# Patient Record
Sex: FEMALE | Race: WHITE | NOT HISPANIC OR LATINO | Employment: OTHER | URBAN - METROPOLITAN AREA
[De-identification: names, ages, dates, MRNs, and addresses within clinical notes are randomized per-mention and may not be internally consistent; named-entity substitution may affect disease eponyms.]

---

## 2017-01-16 ENCOUNTER — TELEPHONE (OUTPATIENT)
Dept: OBSTETRICS AND GYNECOLOGY | Facility: CLINIC | Age: 82
End: 2017-01-16

## 2017-01-16 NOTE — TELEPHONE ENCOUNTER
----- Message from Bushra Torrez sent at 1/16/2017  2:05 PM CST -----  Contact: PAtient  Patient has an awful smell in her Vagina again. She would like for you to refill the prescription you gave her last time. Please send the prescription to [facility] on [address] and [facility]. Any questions call patient [phone].

## 2017-01-16 NOTE — TELEPHONE ENCOUNTER
Pt requesting flagyl to treat BV. She was positive for it in October and states the odor and discharge have returned.

## 2017-01-17 RX ORDER — METRONIDAZOLE 500 MG/1
500 TABLET ORAL EVERY 12 HOURS
Qty: 14 TABLET | Refills: 0 | Status: SHIPPED | OUTPATIENT
Start: 2017-01-17 | End: 2017-01-24

## 2017-03-20 ENCOUNTER — HOSPITAL ENCOUNTER (OUTPATIENT)
Dept: RADIOLOGY | Facility: CLINIC | Age: 82
Discharge: HOME | End: 2017-03-20
Attending: OBSTETRICS & GYNECOLOGY
Payer: MEDICARE

## 2017-03-20 ENCOUNTER — TELEPHONE (OUTPATIENT)
Dept: OBSTETRICS AND GYNECOLOGY | Facility: CLINIC | Age: 82
End: 2017-03-20

## 2017-03-20 ENCOUNTER — OFFICE VISIT (OUTPATIENT)
Dept: OBSTETRICS AND GYNECOLOGY | Facility: CLINIC | Age: 82
End: 2017-03-20
Payer: MEDICARE

## 2017-03-20 VITALS
DIASTOLIC BLOOD PRESSURE: 68 MMHG | SYSTOLIC BLOOD PRESSURE: 142 MMHG | BODY MASS INDEX: 26.94 KG/M2 | WEIGHT: 147.25 LBS

## 2017-03-20 DIAGNOSIS — Z12.31 SCREENING MAMMOGRAM, ENCOUNTER FOR: ICD-10-CM

## 2017-03-20 DIAGNOSIS — N89.8 VAGINAL ODOR: Primary | ICD-10-CM

## 2017-03-20 PROCEDURE — 99213 OFFICE O/P EST LOW 20 MIN: CPT | Mod: S$GLB,,, | Performed by: OBSTETRICS & GYNECOLOGY

## 2017-03-20 PROCEDURE — 1160F RVW MEDS BY RX/DR IN RCRD: CPT | Mod: S$GLB,,, | Performed by: OBSTETRICS & GYNECOLOGY

## 2017-03-20 PROCEDURE — 3077F SYST BP >= 140 MM HG: CPT | Mod: S$GLB,,, | Performed by: OBSTETRICS & GYNECOLOGY

## 2017-03-20 PROCEDURE — 1126F AMNT PAIN NOTED NONE PRSNT: CPT | Mod: S$GLB,,, | Performed by: OBSTETRICS & GYNECOLOGY

## 2017-03-20 PROCEDURE — 3078F DIAST BP <80 MM HG: CPT | Mod: S$GLB,,, | Performed by: OBSTETRICS & GYNECOLOGY

## 2017-03-20 PROCEDURE — 1157F ADVNC CARE PLAN IN RCRD: CPT | Mod: S$GLB,,, | Performed by: OBSTETRICS & GYNECOLOGY

## 2017-03-20 PROCEDURE — 77063 BREAST TOMOSYNTHESIS BI: CPT | Mod: 26,,, | Performed by: RADIOLOGY

## 2017-03-20 PROCEDURE — 1159F MED LIST DOCD IN RCRD: CPT | Mod: S$GLB,,, | Performed by: OBSTETRICS & GYNECOLOGY

## 2017-03-20 PROCEDURE — 77067 SCR MAMMO BI INCL CAD: CPT | Mod: TC

## 2017-03-20 PROCEDURE — 99999 PR PBB SHADOW E&M-EST. PATIENT-LVL III: CPT | Mod: PBBFAC,,, | Performed by: OBSTETRICS & GYNECOLOGY

## 2017-03-20 PROCEDURE — 77067 SCR MAMMO BI INCL CAD: CPT | Mod: 26,,, | Performed by: RADIOLOGY

## 2017-03-20 RX ORDER — MUPIROCIN 20 MG/G
OINTMENT TOPICAL
Refills: 0 | COMMUNITY
Start: 2017-01-20 | End: 2018-04-12

## 2017-03-20 NOTE — PROGRESS NOTES
Chief Complaint   Patient presents with    Vaginal Odor     Hx of BV    Vaginal Discharge       History of Present Illness: Bhakti Phipps is a 82 y.o. female that presents today 3/20/2017 for   Chief Complaint   Patient presents with    Vaginal Odor     Hx of BV    Vaginal Discharge   she reports odor bothers her but light discharge.       Past Medical History:   Diagnosis Date    Anxiety     Arthritis     History of melanoma     at age of 26    HTN (hypertension) 5/21/2012    Hypertension     Hypothyroid 5/21/2012    Hypothyroidism     Insomnia 2/25/2014    Melanoma     age 26    Osteoarthritis 5/21/2012    Tremor        Past Surgical History:   Procedure Laterality Date    CHOLECYSTECTOMY  10-11    lap ayse    HYSTERECTOMY      with oopherectomy    JOINT REPLACEMENT      L knee    RADICAL NECK DISSECTION      for melanoma       Current Outpatient Prescriptions   Medication Sig Dispense Refill    ascorbic acid (VITAMIN C) 1000 MG tablet Take 1,000 mg by mouth once daily.      aspirin (ECOTRIN) 81 MG EC tablet Take 81 mg by mouth once daily.      CALCIUM CARBONATE (CALCIUM 500 ORAL) Take by mouth once daily.      cholecalciferol, vitamin D3, (VITAMIN D3) 1,000 unit capsule Take 1,000 Units by mouth once daily.      cyanocobalamin, vitamin B-12, 3,000 mcg Subl Place under the tongue once daily.      hydrochlorothiazide (HYDRODIURIL) 12.5 MG Tab TAKE 1 TABLET ONE TIME DAILY 90 tablet 3    hydrocodone-acetaminophen 5-325mg (NORCO) 5-325 mg per tablet Take 0.5 tablets by mouth 2 (two) times daily as needed for Pain. 90 tablet 0    LACTOBAC CMB #3/FOS/PANTETHINE (PROBIOTIC & ACIDOPHILUS ORAL) Take 1 capsule by mouth once daily.       levothyroxine (SYNTHROID) 125 MCG tablet TAKE 1 TABLET ONE TIME DAILY 90 tablet 1    lidocaine (LIDODERM) 5 % Place 1 patch onto the skin once daily. Remove & Discard patch within 12 hours or as directed by MD 30 patch 2    lorazepam (ATIVAN) 1 MG tablet Take  1 tablet (1 mg total) by mouth every evening. 90 tablet 1    magnesium oxide 500 mg Tab Take 1 tablet by mouth once daily.      meloxicam (MOBIC) 15 MG tablet Take 15 mg by mouth once daily.      mupirocin (BACTROBAN) 2 % ointment APPLY TO THE AFFECTED AREA TID PRN  0    oxybutynin (DITROPAN) 5 MG Tab Take 1 tablet (5 mg total) by mouth 3 (three) times daily. 180 tablet 3    propranolol (INDERAL) 10 MG tablet Take 1 tablet (10 mg total) by mouth 2 (two) times daily. 180 tablet 3    temazepam (RESTORIL) 30 mg capsule TAKE 1 CAPSULE EVERY NIGHT AS NEEDED  FOR  INSOMNIA 90 capsule 1    venlafaxine (EFFEXOR-XR) 37.5 MG 24 hr capsule Take 1 capsule (37.5 mg total) by mouth once daily. 90 capsule 3     No current facility-administered medications for this visit.        Review of patient's allergies indicates:   Allergen Reactions    Sulfa (sulfonamide antibiotics) Swelling       Family History   Problem Relation Age of Onset    Cancer Mother      pancreatic    Cancer Father      leukemia    Cancer Brother      leukemia    Cancer Sister      liver cancer       Social History   Substance Use Topics    Smoking status: Former Smoker     Quit date: 5/21/1975    Smokeless tobacco: Never Used    Alcohol use No       OB History   No data available       Review of Symptoms:  GENERAL: fatigue  Taking effexor with Dr. rodriguez for depression  SKIN: Denies rash or lesions.   HEAD: Denies head injury or headache.   NODES: Denies enlarged lymph nodes.   CHEST: Denies chest pain or shortness of breath.   CARDIOVASCULAR: Denies palpitations or left sided chest pain.   ABDOMEN: No abdominal pain, constipation, diarrhea, nausea, vomiting or rectal bleeding.   URINARY: No frequency, dysuria, hematuria, or burning on urination.  HEMATOLOGIC: No easy bruisability or excessive bleeding.   MUSCULOSKELETAL: Denies joint pain or swelling.     BP (!) 142/68  Wt 66.8 kg (147 lb 4.3 oz)  BMI 26.94 kg/m2  Physical Exam:  APPEARANCE: Well  nourished, well developed, in no acute distress.  SKIN: Normal skin turgor, no lesions.  NECK: Neck symmetric without masses   RESPIRATORY: Normal respiratory effort with no retractions or use of accessory muscles  CARDIOVASCULAR: Peripheral vascular system with no swelling no varicosities and palpation of pulses normal  LYMPHATIC: No enlargements of the lymph nodes noted in the neck, axillae, or groin  ABDOMEN: Soft. No tenderness or masses. No hepatosplenomegaly. No hernias.PELVIC: Normal external female genitalia without lesions. Normal hair distribution. Adequate perineal body, normal urethral meatus. Urethra with no masses.  Bladder nontender. Vagina moist and well rugated without lesions or discharge.  Urethra and bladder normal.  EXTREMITIES: No clubbing cyanosis or edema.    ASSESSMENT/PLAN:  Vaginal odor  -     Vaginosis Screen by DNA Probe    Screening mammogram, encounter for  -     Mammo Digital Screening Bilat with CAD; Future; Expected date: 3/20/17        15 minutes spent today with this patient. Greater than half spent in counseling today.

## 2017-03-20 NOTE — TELEPHONE ENCOUNTER
----- Message from Katrin Unger sent at 3/20/2017  7:33 AM CDT -----  Patient is requesting a call back states she has a funny smell/bacterial infection, she is unable to get an appointment until 04/04/17, contact patient at [phone] to advise.     Thank you

## 2017-03-20 NOTE — MR AVS SNAPSHOT
[facility]  [address] [place] [address]  Phone: [phone]                  Bhakti Phipps   3/20/2017 11:20 AM   Office Visit    Description:  Female : 1934   Provider:  Laly Cross MD   Department:  [facility]           Reason for Visit     Vaginal Odor     Vaginal Discharge                To Do List           Future Appointments        Provider Department Dept Phone    3/20/2017 11:20 AM Laly Cross MD [facility] [phone]      Goals (5 Years of Data)     None      [facility]     [facility] -  Assistance  Registered nurses in the [facility] provide clinical advisement, health education, appointment booking, and other advisory services.  Call for this free service at [phone].             Medications           Message regarding Medications     Verify the changes and/or additions to your medication regime listed below are the same as discussed with your clinician today.  If any of these changes or additions are incorrect, please notify your healthcare provider.             Verify that the below list of medications is an accurate representation of the medications you are currently taking.  If none reported, the list may be blank. If incorrect, please contact your healthcare provider. Carry this list with you in case of emergency.           Current Medications     ascorbic acid (VITAMIN C) 1000 MG tablet Take 1,000 mg by mouth once daily.    aspirin (ECOTRIN) 81 MG EC tablet Take 81 mg by mouth once daily.    CALCIUM CARBONATE (CALCIUM 500 ORAL) Take by mouth once daily.    cholecalciferol, vitamin D3, (VITAMIN D3) 1,000 unit capsule Take 1,000 Units by mouth once daily.    cyanocobalamin, vitamin B-12, 3,000 mcg Subl Place under the tongue once daily.    hydrochlorothiazide (HYDRODIURIL) 12.5 MG Tab TAKE 1 TABLET ONE TIME DAILY    hydrocodone-acetaminophen 5-325mg (NORCO) 5-325 mg  per tablet Take 0.5 tablets by mouth 2 (two) times daily as needed for Pain.    LACTOBAC CMB #3/FOS/PANTETHINE (PROBIOTIC & ACIDOPHILUS ORAL) Take 1 capsule by mouth once daily.     levothyroxine (SYNTHROID) 125 MCG tablet TAKE 1 TABLET ONE TIME DAILY    lidocaine (LIDODERM) 5 % Place 1 patch onto the skin once daily. Remove & Discard patch within 12 hours or as directed by MD    lorazepam (ATIVAN) 1 MG tablet Take 1 tablet (1 mg total) by mouth every evening.    magnesium oxide 500 mg Tab Take 1 tablet by mouth once daily.    meloxicam (MOBIC) 15 MG tablet Take 15 mg by mouth once daily.    mupirocin (BACTROBAN) 2 % ointment APPLY TO THE AFFECTED AREA TID PRN    oxybutynin (DITROPAN) 5 MG Tab Take 1 tablet (5 mg total) by mouth 3 (three) times daily.    propranolol (INDERAL) 10 MG tablet Take 1 tablet (10 mg total) by mouth 2 (two) times daily.    temazepam (RESTORIL) 30 mg capsule TAKE 1 CAPSULE EVERY NIGHT AS NEEDED  FOR  INSOMNIA    venlafaxine (EFFEXOR-XR) 37.5 MG 24 hr capsule Take 1 capsule (37.5 mg total) by mouth once daily.           Clinical Reference Information           Your Vitals Were     BP Weight BMI          142/68 66.8 kg (147 lb 4.3 oz) 26.94 kg/m2        Blood Pressure          Most Recent Value    BP  (!)  142/68      Allergies as of 3/20/2017     Sulfa (Sulfonamide Antibiotics)      Immunizations Administered on Date of Encounter - 3/20/2017     None      Language Assistance Services     ATTENTION: Language assistance services are available, free of charge. Please call [phone].      ATENCIÓN: Si prashanth davalos, tiene a desir disposición servicios gratuitos de asistencia lingüística. Llame al [phone].     EARNEST Ý: N?u b?n nói Ti?ng Vi?t, có các d?ch v? h? tr? ngôn ng? mi?n phí dành cho b?n. G?i s? [phone].         [facility] complies with applicable Federal civil rights laws and does not discriminate on the basis of race, color, national origin, age, disability, or  sex.

## 2017-03-21 ENCOUNTER — TELEPHONE (OUTPATIENT)
Dept: OBSTETRICS AND GYNECOLOGY | Facility: CLINIC | Age: 82
End: 2017-03-21

## 2017-03-21 LAB
CANDIDA RRNA VAG QL PROBE: NEGATIVE
G VAGINALIS RRNA GENITAL QL PROBE: POSITIVE
T VAGINALIS RRNA GENITAL QL PROBE: NEGATIVE

## 2017-03-21 RX ORDER — METRONIDAZOLE 500 MG/1
500 TABLET ORAL EVERY 12 HOURS
Qty: 20 TABLET | Refills: 0 | Status: SHIPPED | OUTPATIENT
Start: 2017-03-21 | End: 2017-03-28

## 2017-03-27 ENCOUNTER — TELEPHONE (OUTPATIENT)
Dept: OBSTETRICS AND GYNECOLOGY | Facility: CLINIC | Age: 82
End: 2017-03-27

## 2017-03-27 NOTE — TELEPHONE ENCOUNTER
----- Message from Crystal Myers sent at 3/27/2017  9:21 AM CDT -----  Contact: etelvina   Calling, appt cancelled   Wants to know if follow up appt needs to be scheduled  Call back

## 2017-04-19 RX ORDER — HYDROCODONE BITARTRATE AND ACETAMINOPHEN 5; 325 MG/1; MG/1
0.5 TABLET ORAL 2 TIMES DAILY PRN
Qty: 90 TABLET | Refills: 0 | Status: SHIPPED | OUTPATIENT
Start: 2017-04-19 | End: 2017-10-03 | Stop reason: SDUPTHER

## 2017-04-19 NOTE — TELEPHONE ENCOUNTER
----- Message from Alexandra Robb sent at 4/19/2017 11:11 AM CDT -----  Contact: Patient  Bhakti, patient [phone], Calling for refill Rx Hydrocodone APAP 5/325 mg one pill per day,  for 90 days.    [facility] - [place], [place] - [address]  [address] [address]  Phone: [phone] Fax: [phone]    Please advise. Thanks.

## 2017-05-24 RX ORDER — OXYBUTYNIN CHLORIDE 5 MG/1
TABLET ORAL
Qty: 270 TABLET | Refills: 3 | Status: SHIPPED | OUTPATIENT
Start: 2017-05-24 | End: 2017-10-03 | Stop reason: SDUPTHER

## 2017-06-16 RX ORDER — TEMAZEPAM 30 MG/1
CAPSULE ORAL
Qty: 90 CAPSULE | Refills: 0 | Status: SHIPPED | OUTPATIENT
Start: 2017-06-16 | End: 2017-09-26 | Stop reason: SDUPTHER

## 2017-07-11 RX ORDER — HYDROCHLOROTHIAZIDE 12.5 MG/1
TABLET ORAL
Qty: 90 TABLET | Refills: 3 | Status: SHIPPED | OUTPATIENT
Start: 2017-07-11 | End: 2017-10-03 | Stop reason: SDUPTHER

## 2017-07-31 ENCOUNTER — OFFICE VISIT (OUTPATIENT)
Dept: FAMILY MEDICINE | Facility: CLINIC | Age: 82
End: 2017-07-31
Payer: MEDICARE

## 2017-07-31 VITALS
TEMPERATURE: 98 F | DIASTOLIC BLOOD PRESSURE: 72 MMHG | SYSTOLIC BLOOD PRESSURE: 132 MMHG | WEIGHT: 151.69 LBS | HEART RATE: 82 BPM | BODY MASS INDEX: 27.92 KG/M2 | HEIGHT: 62 IN

## 2017-07-31 DIAGNOSIS — N39.0 URINARY TRACT INFECTION WITHOUT HEMATURIA, SITE UNSPECIFIED: Primary | ICD-10-CM

## 2017-07-31 LAB
BILIRUB SERPL-MCNC: NEGATIVE MG/DL
BLOOD URINE, POC: 250
COLOR, POC UA: NORMAL
GLUCOSE UR QL STRIP: NORMAL
KETONES UR QL STRIP: NEGATIVE
LEUKOCYTE ESTERASE URINE, POC: NORMAL
NITRITE, POC UA: POSITIVE
PH, POC UA: 6
PROTEIN, POC: 30
SPECIFIC GRAVITY, POC UA: 1.01
UROBILINOGEN, POC UA: NORMAL

## 2017-07-31 PROCEDURE — 81001 URINALYSIS AUTO W/SCOPE: CPT | Mod: S$GLB,,, | Performed by: PHYSICIAN ASSISTANT

## 2017-07-31 PROCEDURE — 99999 PR PBB SHADOW E&M-EST. PATIENT-LVL V: CPT | Mod: PBBFAC,,, | Performed by: PHYSICIAN ASSISTANT

## 2017-07-31 PROCEDURE — 99213 OFFICE O/P EST LOW 20 MIN: CPT | Mod: 25,S$GLB,, | Performed by: PHYSICIAN ASSISTANT

## 2017-07-31 PROCEDURE — 87147 CULTURE TYPE IMMUNOLOGIC: CPT

## 2017-07-31 PROCEDURE — 87086 URINE CULTURE/COLONY COUNT: CPT

## 2017-07-31 PROCEDURE — 87088 URINE BACTERIA CULTURE: CPT

## 2017-07-31 PROCEDURE — 1126F AMNT PAIN NOTED NONE PRSNT: CPT | Mod: S$GLB,,, | Performed by: PHYSICIAN ASSISTANT

## 2017-07-31 PROCEDURE — 1159F MED LIST DOCD IN RCRD: CPT | Mod: S$GLB,,, | Performed by: PHYSICIAN ASSISTANT

## 2017-07-31 RX ORDER — CIPROFLOXACIN 250 MG/1
250 TABLET, FILM COATED ORAL 2 TIMES DAILY
Qty: 20 TABLET | Refills: 0 | Status: SHIPPED | OUTPATIENT
Start: 2017-07-31 | End: 2017-08-10

## 2017-07-31 NOTE — PROGRESS NOTES
Subjective:       Patient ID: Bhakti Phipps is a 83 y.o. female.    Chief Complaint: possible UTI (foul smell urine, burning)    HPI   Mild dysuria  Review of Systems   Constitutional: Negative.  Negative for activity change, appetite change, chills, diaphoresis, fatigue, fever and unexpected weight change.   HENT: Negative.    Eyes: Negative.    Cardiovascular: Negative.    Gastrointestinal: Negative.    Endocrine: Negative.    Genitourinary: Positive for dysuria, frequency and urgency. Negative for flank pain and hematuria.   Musculoskeletal: Negative.    Skin: Negative.  Negative for rash.       Objective:      Physical Exam   Constitutional: She appears well-developed and well-nourished. No distress.   HENT:   Head: Normocephalic and atraumatic.   Eyes: Conjunctivae are normal. No scleral icterus.   Neck: Normal range of motion. Neck supple. No tracheal deviation present. No thyromegaly present.   Abdominal: Soft. Bowel sounds are normal. She exhibits no distension and no mass. There is no tenderness. There is no rebound and no guarding. No hernia.   No CVA tenderness   Musculoskeletal: She exhibits no edema.   Lymphadenopathy:     She has no cervical adenopathy.   Skin: Skin is warm and dry. No rash noted.   Vitals reviewed.      Assessment:       1. Urinary tract infection without hematuria, site unspecified        Plan:   Bhakti was seen today for possible uti.    Diagnoses and all orders for this visit:    Urinary tract infection without hematuria, site unspecified  -     POCT urinalysis, dipstick or tablet reag  -     Urine culture  -     ciprofloxacin HCl (CIPRO) 250 MG tablet; Take 1 tablet (250 mg total) by mouth 2 (two) times daily.    hydrate well

## 2017-08-02 LAB — BACTERIA UR CULT: NORMAL

## 2017-08-11 ENCOUNTER — TELEPHONE (OUTPATIENT)
Dept: FAMILY MEDICINE | Facility: CLINIC | Age: 82
End: 2017-08-11

## 2017-08-11 DIAGNOSIS — N39.0 URINARY TRACT INFECTION WITHOUT HEMATURIA, SITE UNSPECIFIED: Primary | ICD-10-CM

## 2017-08-11 NOTE — TELEPHONE ENCOUNTER
Kishore,  Pt took cipro x 10 days. Still has a slight odor to urine. She wanted to know what culture grew out and if she needs more abx.

## 2017-08-11 NOTE — TELEPHONE ENCOUNTER
----- Message from Carol Jacobo sent at 8/11/2017 10:40 AM CDT -----  Contact: [phone]    Calling  About  meds  For a  uti // please call for  All details//

## 2017-08-16 RX ORDER — LEVOTHYROXINE SODIUM 125 UG/1
TABLET ORAL
Qty: 90 TABLET | Refills: 1 | Status: SHIPPED | OUTPATIENT
Start: 2017-08-16 | End: 2017-10-03 | Stop reason: SDUPTHER

## 2017-08-18 ENCOUNTER — LAB VISIT (OUTPATIENT)
Dept: LAB | Facility: HOSPITAL | Age: 82
End: 2017-08-18
Attending: FAMILY MEDICINE
Payer: MEDICARE

## 2017-08-18 DIAGNOSIS — N39.0 URINARY TRACT INFECTION WITHOUT HEMATURIA, SITE UNSPECIFIED: ICD-10-CM

## 2017-08-18 LAB
BACTERIA #/AREA URNS HPF: NORMAL /HPF
BILIRUB UR QL STRIP: NEGATIVE
CLARITY UR: CLEAR
COLOR UR: YELLOW
GLUCOSE UR QL STRIP: NEGATIVE
HGB UR QL STRIP: ABNORMAL
KETONES UR QL STRIP: NEGATIVE
LEUKOCYTE ESTERASE UR QL STRIP: NEGATIVE
MICROSCOPIC COMMENT: NORMAL
NITRITE UR QL STRIP: NEGATIVE
PH UR STRIP: 6 [PH] (ref 5–8)
PROT UR QL STRIP: NEGATIVE
RBC #/AREA URNS HPF: 3 /HPF (ref 0–4)
SP GR UR STRIP: 1.02 (ref 1–1.03)
URN SPEC COLLECT METH UR: ABNORMAL

## 2017-08-18 PROCEDURE — 81000 URINALYSIS NONAUTO W/SCOPE: CPT | Mod: PO

## 2017-08-18 PROCEDURE — 87086 URINE CULTURE/COLONY COUNT: CPT

## 2017-08-20 LAB — BACTERIA UR CULT: NO GROWTH

## 2017-08-21 ENCOUNTER — TELEPHONE (OUTPATIENT)
Dept: FAMILY MEDICINE | Facility: CLINIC | Age: 82
End: 2017-08-21

## 2017-09-27 RX ORDER — TEMAZEPAM 30 MG/1
CAPSULE ORAL
Qty: 30 CAPSULE | Refills: 0 | Status: SHIPPED | OUTPATIENT
Start: 2017-09-27 | End: 2017-09-27 | Stop reason: SDUPTHER

## 2017-09-27 RX ORDER — TEMAZEPAM 30 MG/1
CAPSULE ORAL
Qty: 30 CAPSULE | Refills: 0 | Status: SHIPPED | OUTPATIENT
Start: 2017-09-27 | End: 2017-10-03 | Stop reason: SDUPTHER

## 2017-09-27 NOTE — TELEPHONE ENCOUNTER
Notify patient that temazepam 30 mg 1 by mouth daily at bedtime when necessary sleep was sent in with 0 refills; this should hold her to Dr. Jauregui returns

## 2017-09-28 ENCOUNTER — TELEPHONE (OUTPATIENT)
Dept: FAMILY MEDICINE | Facility: CLINIC | Age: 82
End: 2017-09-28

## 2017-09-28 NOTE — TELEPHONE ENCOUNTER
Spoke w/ pt. Informed pt about refill approval. Pt verbalized understanding. Pt will pick it up on 10/3/2017.

## 2017-10-03 ENCOUNTER — OFFICE VISIT (OUTPATIENT)
Dept: FAMILY MEDICINE | Facility: CLINIC | Age: 82
End: 2017-10-03
Payer: MEDICARE

## 2017-10-03 VITALS
HEART RATE: 70 BPM | SYSTOLIC BLOOD PRESSURE: 132 MMHG | BODY MASS INDEX: 27.97 KG/M2 | TEMPERATURE: 98 F | HEIGHT: 62 IN | DIASTOLIC BLOOD PRESSURE: 68 MMHG | WEIGHT: 152 LBS

## 2017-10-03 DIAGNOSIS — Z00.00 ROUTINE ADULT HEALTH MAINTENANCE: ICD-10-CM

## 2017-10-03 DIAGNOSIS — R53.83 FATIGUE, UNSPECIFIED TYPE: ICD-10-CM

## 2017-10-03 DIAGNOSIS — F41.9 ANXIETY: ICD-10-CM

## 2017-10-03 DIAGNOSIS — N39.45 CONTINUOUS LEAKAGE OF URINE: ICD-10-CM

## 2017-10-03 DIAGNOSIS — M47.15 OSTEOARTHRITIS OF SPINE WITH MYELOPATHY, THORACOLUMBAR REGION: ICD-10-CM

## 2017-10-03 DIAGNOSIS — I10 ESSENTIAL HYPERTENSION: Primary | ICD-10-CM

## 2017-10-03 DIAGNOSIS — E03.4 HYPOTHYROIDISM DUE TO ACQUIRED ATROPHY OF THYROID: ICD-10-CM

## 2017-10-03 DIAGNOSIS — F32.A DEPRESSION, UNSPECIFIED DEPRESSION TYPE: ICD-10-CM

## 2017-10-03 DIAGNOSIS — R25.1 TREMOR: ICD-10-CM

## 2017-10-03 PROCEDURE — 90662 IIV NO PRSV INCREASED AG IM: CPT | Mod: S$GLB,,, | Performed by: FAMILY MEDICINE

## 2017-10-03 PROCEDURE — G0008 ADMIN INFLUENZA VIRUS VAC: HCPCS | Mod: S$GLB,,, | Performed by: FAMILY MEDICINE

## 2017-10-03 PROCEDURE — 99214 OFFICE O/P EST MOD 30 MIN: CPT | Mod: 25,S$GLB,, | Performed by: FAMILY MEDICINE

## 2017-10-03 PROCEDURE — 99999 PR PBB SHADOW E&M-EST. PATIENT-LVL III: CPT | Mod: PBBFAC,,, | Performed by: FAMILY MEDICINE

## 2017-10-03 PROCEDURE — 99499 UNLISTED E&M SERVICE: CPT | Mod: S$GLB,,, | Performed by: FAMILY MEDICINE

## 2017-10-03 RX ORDER — OXYBUTYNIN CHLORIDE 5 MG/1
5 TABLET ORAL 3 TIMES DAILY
Qty: 270 TABLET | Refills: 3 | Status: SHIPPED | OUTPATIENT
Start: 2017-10-03 | End: 2018-04-12

## 2017-10-03 RX ORDER — HYDROCODONE BITARTRATE AND ACETAMINOPHEN 5; 325 MG/1; MG/1
0.5 TABLET ORAL 2 TIMES DAILY PRN
Qty: 90 TABLET | Refills: 0 | Status: SHIPPED | OUTPATIENT
Start: 2017-10-03 | End: 2018-04-12 | Stop reason: SDUPTHER

## 2017-10-03 RX ORDER — TEMAZEPAM 30 MG/1
CAPSULE ORAL
Qty: 90 CAPSULE | Refills: 1 | Status: SHIPPED | OUTPATIENT
Start: 2017-10-03 | End: 2018-04-12 | Stop reason: SDUPTHER

## 2017-10-03 RX ORDER — HYDROCHLOROTHIAZIDE 12.5 MG/1
TABLET ORAL
Qty: 90 TABLET | Refills: 3 | Status: SHIPPED | OUTPATIENT
Start: 2017-10-03 | End: 2018-04-12 | Stop reason: SDUPTHER

## 2017-10-03 RX ORDER — PROPRANOLOL HYDROCHLORIDE 10 MG/1
10 TABLET ORAL 2 TIMES DAILY
Qty: 180 TABLET | Refills: 3 | Status: SHIPPED | OUTPATIENT
Start: 2017-10-03 | End: 2018-08-23 | Stop reason: SINTOL

## 2017-10-03 RX ORDER — MELOXICAM 15 MG/1
15 TABLET ORAL DAILY
Qty: 30 TABLET | Refills: 2 | Status: SHIPPED | OUTPATIENT
Start: 2017-10-03 | End: 2017-12-06 | Stop reason: SDUPTHER

## 2017-10-03 RX ORDER — LORAZEPAM 1 MG/1
1 TABLET ORAL NIGHTLY
Qty: 90 TABLET | Refills: 1 | Status: SHIPPED | OUTPATIENT
Start: 2017-10-03 | End: 2018-04-12 | Stop reason: SDUPTHER

## 2017-10-03 RX ORDER — LEVOTHYROXINE SODIUM 125 UG/1
125 TABLET ORAL DAILY
Qty: 90 TABLET | Refills: 4 | Status: SHIPPED | OUTPATIENT
Start: 2017-10-03 | End: 2018-10-08 | Stop reason: SDUPTHER

## 2017-10-03 RX ORDER — TEMAZEPAM 30 MG/1
CAPSULE ORAL
Qty: 30 CAPSULE | Refills: 0 | Status: SHIPPED | OUTPATIENT
Start: 2017-10-03 | End: 2017-10-03 | Stop reason: SDUPTHER

## 2017-10-03 NOTE — PROGRESS NOTES
Subjective:       Patient ID: Bhakti Phipps is a 83 y.o. female.    Chief Complaint: Annual Exam (Annual check up. Poss due for lab. Needs flu shot. Poss ortho referral for L knee pain, PMHx L knee repleacement)    Went to the dentist to replace her dentures, was told the bones in her face were shifting.    L knee replaced 12 years ago, started causing pain 2-3 weeks ago after it twisted a bit as she stood up. No pain at rest, highest in mornings when she wakes up, better as she moves around.    Took ambien after 1 glass of wine recently, caused her dizziness for 2 days and had to stay in bed for 2 days. Was worked up by cardiology-no problems with carotids, arrhythmias.    Occasionally chest tightness after eating. Rare, after solid foods. Has trouble chewing with current dentures.    Fatigue, increased after  passed away. Has lost interest in going out with friends. Has lots of family members checking in with her frequently. Has iron deficiency anemia family Hx. No weight loss, no fever, no sweating.    Lumbar and cervical pain, relieved with ice packs. Manageable.      Review of Systems   Constitutional: Negative for activity change, appetite change, diaphoresis, fatigue, fever and unexpected weight change.   HENT: Negative.    Eyes: Negative.    Respiratory: Positive for chest tightness. Negative for cough, choking, shortness of breath and wheezing.    Cardiovascular: Negative for chest pain, palpitations and leg swelling.   Gastrointestinal: Positive for constipation. Negative for abdominal pain, blood in stool, diarrhea, nausea and vomiting.   Genitourinary: Negative for hematuria and urgency.        Incontinence     Musculoskeletal: Positive for arthralgias, back pain and neck pain. Negative for gait problem, joint swelling and myalgias.   Skin: Negative.    Neurological: Negative for syncope, light-headedness and headaches.   Hematological: Negative.    Psychiatric/Behavioral: Positive for  dysphoric mood.       Objective:      Physical Exam   Constitutional: She appears well-developed and well-nourished.   HENT:   Head: Normocephalic and atraumatic.   Fine resting tremor of lower lip   Eyes: Conjunctivae are normal.   Neck: Normal range of motion. Neck supple.   Cardiovascular: Normal rate, regular rhythm, normal heart sounds and intact distal pulses.    Pulmonary/Chest: Effort normal and breath sounds normal.   Abdominal: Soft. Bowel sounds are normal.   Musculoskeletal: Normal range of motion.        Left knee: She exhibits normal range of motion, no swelling, no effusion, no deformity and normal alignment. Tenderness found. Medial joint line tenderness noted.   Neurological: She is alert.   Skin: Skin is warm.   Psychiatric: She has a normal mood and affect. Her behavior is normal. Judgment and thought content normal.       Assessment:       1. Essential hypertension    2. Tremor    3. Hypothyroidism due to acquired atrophy of thyroid    4. Osteoarthritis of spine with myelopathy, thoracolumbar region    5. Anxiety    6. Depression, unspecified depression type    7. Routine adult health maintenance    8. Continuous leakage of urine    9. Fatigue, unspecified type        Plan:       1. Fatigue  -CBC, CMP, TSH, UA    2. Anxiety  -Lorazepam taken as needed rarely  -Trial stopping venlafaxine (to help with fatigue and she doesn't feel need for daily anxiety control). Warned about ache and figity feeling during withdrawal and to represent if increase in anxiety or depression Sx.    3. Health maintenance  -Flu vax

## 2017-10-04 ENCOUNTER — LAB VISIT (OUTPATIENT)
Dept: LAB | Facility: HOSPITAL | Age: 82
End: 2017-10-04
Attending: FAMILY MEDICINE
Payer: MEDICARE

## 2017-10-04 DIAGNOSIS — R53.83 FATIGUE, UNSPECIFIED TYPE: ICD-10-CM

## 2017-10-04 DIAGNOSIS — I10 ESSENTIAL HYPERTENSION: ICD-10-CM

## 2017-10-04 DIAGNOSIS — E03.4 HYPOTHYROIDISM DUE TO ACQUIRED ATROPHY OF THYROID: ICD-10-CM

## 2017-10-04 LAB
ALBUMIN SERPL BCP-MCNC: 3.8 G/DL
ALP SERPL-CCNC: 89 U/L
ALT SERPL W/O P-5'-P-CCNC: 15 U/L
ANION GAP SERPL CALC-SCNC: 9 MMOL/L
AST SERPL-CCNC: 14 U/L
BASOPHILS # BLD AUTO: 0.05 K/UL
BASOPHILS NFR BLD: 0.6 %
BILIRUB SERPL-MCNC: 0.6 MG/DL
BUN SERPL-MCNC: 20 MG/DL
CALCIUM SERPL-MCNC: 9.8 MG/DL
CHLORIDE SERPL-SCNC: 102 MMOL/L
CHOLEST SERPL-MCNC: 187 MG/DL
CHOLEST/HDLC SERPL: 4.6 {RATIO}
CO2 SERPL-SCNC: 29 MMOL/L
CREAT SERPL-MCNC: 0.8 MG/DL
DIFFERENTIAL METHOD: ABNORMAL
EOSINOPHIL # BLD AUTO: 0.2 K/UL
EOSINOPHIL NFR BLD: 2.1 %
ERYTHROCYTE [DISTWIDTH] IN BLOOD BY AUTOMATED COUNT: 13.9 %
EST. GFR  (AFRICAN AMERICAN): >60 ML/MIN/1.73 M^2
EST. GFR  (NON AFRICAN AMERICAN): >60 ML/MIN/1.73 M^2
GLUCOSE SERPL-MCNC: 98 MG/DL
HCT VFR BLD AUTO: 36.1 %
HDLC SERPL-MCNC: 41 MG/DL
HDLC SERPL: 21.9 %
HGB BLD-MCNC: 11.8 G/DL
LDLC SERPL CALC-MCNC: 115 MG/DL
LYMPHOCYTES # BLD AUTO: 1.5 K/UL
LYMPHOCYTES NFR BLD: 16.8 %
MCH RBC QN AUTO: 29.5 PG
MCHC RBC AUTO-ENTMCNC: 32.7 G/DL
MCV RBC AUTO: 90 FL
MONOCYTES # BLD AUTO: 0.8 K/UL
MONOCYTES NFR BLD: 9.2 %
NEUTROPHILS # BLD AUTO: 6.3 K/UL
NEUTROPHILS NFR BLD: 71.1 %
NONHDLC SERPL-MCNC: 146 MG/DL
PLATELET # BLD AUTO: 317 K/UL
PMV BLD AUTO: 10.8 FL
POTASSIUM SERPL-SCNC: 4.5 MMOL/L
PROT SERPL-MCNC: 7.3 G/DL
RBC # BLD AUTO: 4 M/UL
SODIUM SERPL-SCNC: 140 MMOL/L
TRIGL SERPL-MCNC: 155 MG/DL
TSH SERPL DL<=0.005 MIU/L-ACNC: 2.43 UIU/ML
WBC # BLD AUTO: 8.88 K/UL

## 2017-10-04 PROCEDURE — 80053 COMPREHEN METABOLIC PANEL: CPT

## 2017-10-04 PROCEDURE — 36415 COLL VENOUS BLD VENIPUNCTURE: CPT | Mod: PO

## 2017-10-04 PROCEDURE — 80061 LIPID PANEL: CPT

## 2017-10-04 PROCEDURE — 85025 COMPLETE CBC W/AUTO DIFF WBC: CPT

## 2017-10-04 PROCEDURE — 84443 ASSAY THYROID STIM HORMONE: CPT

## 2017-10-12 ENCOUNTER — TELEPHONE (OUTPATIENT)
Dept: FAMILY MEDICINE | Facility: CLINIC | Age: 82
End: 2017-10-12

## 2017-10-12 RX ORDER — CODEINE PHOSPHATE AND GUAIFENESIN 10; 100 MG/5ML; MG/5ML
5 SOLUTION ORAL EVERY 8 HOURS PRN
Qty: 180 ML | Refills: 1 | Status: SHIPPED | OUTPATIENT
Start: 2017-10-12 | End: 2017-10-22

## 2017-10-12 NOTE — TELEPHONE ENCOUNTER
----- Message from Val Lopez sent at 10/12/2017  7:45 AM CDT -----  Contact: patient  Patient calling in regards to requesting something be called in for a bad sore throat and cough. Please advise.  Call back   Thanks!    [facility] [id number] [place] [address] [address] [place] [address] AT [facility]  [address] [facility] [address]  Phone: [phone] Fax: [phone]

## 2017-10-12 NOTE — TELEPHONE ENCOUNTER
Spoke with pt, she states she has had cough x5 days. She is hoarse and has sore throat. Denies fever. Requesting Rx for cough.

## 2017-10-20 RX ORDER — VENLAFAXINE HYDROCHLORIDE 37.5 MG/1
CAPSULE, EXTENDED RELEASE ORAL
Qty: 90 CAPSULE | Refills: 0 | Status: SHIPPED | OUTPATIENT
Start: 2017-10-20 | End: 2017-12-26 | Stop reason: SDUPTHER

## 2017-12-06 RX ORDER — MELOXICAM 15 MG/1
TABLET ORAL
Qty: 90 TABLET | Refills: 2 | Status: SHIPPED | OUTPATIENT
Start: 2017-12-06 | End: 2018-06-26 | Stop reason: SDUPTHER

## 2017-12-26 RX ORDER — VENLAFAXINE HYDROCHLORIDE 37.5 MG/1
CAPSULE, EXTENDED RELEASE ORAL
Qty: 90 CAPSULE | Refills: 0 | Status: SHIPPED | OUTPATIENT
Start: 2017-12-26 | End: 2018-02-27 | Stop reason: SDUPTHER

## 2018-02-28 RX ORDER — VENLAFAXINE HYDROCHLORIDE 37.5 MG/1
CAPSULE, EXTENDED RELEASE ORAL
Qty: 90 CAPSULE | Refills: 1 | Status: SHIPPED | OUTPATIENT
Start: 2018-02-28 | End: 2018-07-24 | Stop reason: SDUPTHER

## 2018-02-28 NOTE — PROGRESS NOTES
Refill Authorization Note     is requesting a refill authorization.    Brief assessment and rationale for refill: DEFER; unclear how pt did after trialing wean from venlafaxing  Amount/Quantity of medication ordered: 90d         Refills Authorized: Yes  If authorized number of refills: 0        Medication-related problems identified:   Medication education needs  Dose adjustment  Drug-drug interaction  Beer's criteria Adjustment  Therapeutic duplication  Medication Therapy Plan: LOV discussed with pt trying to wean; but shortly after seems like refills were sent in to [employer] w/ sig to stop duloxetine.  Hasn't been seen since. Defer and que 3 mo.  Of note;' pt has an opioid and 2 benzos on her med list which may contribute to her feeling dizzy and fatigued  Name and strength of medication: VENLAFAXINE HCL ER 37.5 MG Capsule Extended Release 24 Hour  How patient will take medication: t1t po daily   Medication reconciliation completed: No  Comments:   BP Readings from Last 3 Encounters:   10/03/17 132/68   07/31/17 132/72   03/20/17 (!) 142/68      Lab Results   Component Value Date    CREATININE 0.8 10/04/2017    BUN 20 10/04/2017     10/04/2017    K 4.5 10/04/2017     10/04/2017    CO2 29 10/04/2017

## 2018-04-12 ENCOUNTER — OFFICE VISIT (OUTPATIENT)
Dept: FAMILY MEDICINE | Facility: CLINIC | Age: 83
End: 2018-04-12
Payer: MEDICARE

## 2018-04-12 VITALS
SYSTOLIC BLOOD PRESSURE: 120 MMHG | DIASTOLIC BLOOD PRESSURE: 62 MMHG | WEIGHT: 148.5 LBS | BODY MASS INDEX: 27.33 KG/M2 | HEIGHT: 62 IN

## 2018-04-12 DIAGNOSIS — I10 ESSENTIAL HYPERTENSION: ICD-10-CM

## 2018-04-12 DIAGNOSIS — G47.01 INSOMNIA DUE TO MEDICAL CONDITION: ICD-10-CM

## 2018-04-12 DIAGNOSIS — B07.9 VIRAL WARTS, UNSPECIFIED TYPE: ICD-10-CM

## 2018-04-12 DIAGNOSIS — F41.9 ANXIETY: Primary | ICD-10-CM

## 2018-04-12 DIAGNOSIS — N39.3 STRESS INCONTINENCE: ICD-10-CM

## 2018-04-12 DIAGNOSIS — M15.9 PRIMARY OSTEOARTHRITIS INVOLVING MULTIPLE JOINTS: ICD-10-CM

## 2018-04-12 DIAGNOSIS — H35.3132 INTERMEDIATE STAGE NONEXUDATIVE AGE-RELATED MACULAR DEGENERATION OF BOTH EYES: ICD-10-CM

## 2018-04-12 PROCEDURE — 99999 PR PBB SHADOW E&M-EST. PATIENT-LVL III: CPT | Mod: PBBFAC,,, | Performed by: FAMILY MEDICINE

## 2018-04-12 PROCEDURE — 3074F SYST BP LT 130 MM HG: CPT | Mod: CPTII,S$GLB,, | Performed by: FAMILY MEDICINE

## 2018-04-12 PROCEDURE — 99499 UNLISTED E&M SERVICE: CPT | Mod: S$PBB,,, | Performed by: FAMILY MEDICINE

## 2018-04-12 PROCEDURE — 17000 DESTRUCT PREMALG LESION: CPT | Mod: S$GLB,,, | Performed by: FAMILY MEDICINE

## 2018-04-12 PROCEDURE — 99214 OFFICE O/P EST MOD 30 MIN: CPT | Mod: 25,S$GLB,, | Performed by: FAMILY MEDICINE

## 2018-04-12 PROCEDURE — 3078F DIAST BP <80 MM HG: CPT | Mod: CPTII,S$GLB,, | Performed by: FAMILY MEDICINE

## 2018-04-12 RX ORDER — TEMAZEPAM 30 MG/1
CAPSULE ORAL
Qty: 90 CAPSULE | Refills: 1 | Status: SHIPPED | OUTPATIENT
Start: 2018-04-12 | End: 2018-08-23

## 2018-04-12 RX ORDER — HYDROCHLOROTHIAZIDE 12.5 MG/1
12.5 TABLET ORAL DAILY PRN
Qty: 90 TABLET | Refills: 3
Start: 2018-04-12 | End: 2018-09-04 | Stop reason: SDUPTHER

## 2018-04-12 RX ORDER — HYDROCODONE BITARTRATE AND ACETAMINOPHEN 5; 325 MG/1; MG/1
0.5 TABLET ORAL 2 TIMES DAILY PRN
Qty: 90 TABLET | Refills: 0 | Status: SHIPPED | OUTPATIENT
Start: 2018-04-12 | End: 2018-04-18 | Stop reason: SDUPTHER

## 2018-04-12 RX ORDER — LORAZEPAM 1 MG/1
1 TABLET ORAL EVERY 12 HOURS PRN
Qty: 90 TABLET | Refills: 1
Start: 2018-04-12 | End: 2019-03-21 | Stop reason: SDUPTHER

## 2018-04-12 NOTE — PROGRESS NOTES
"Subjective:       Patient ID: Bhakti Phipps is a 83 y.o. female.    Chief Complaint: Follow-up (med f/u. Pt d/c'd Oxybutynin and HCTZ due to urinary symptoms. If Ok, please advise pt and d/c from medlist)    Stopped oxybutinin due to dry mouth. It did not help her urine leak anyway. She has stress incontinence and wears a pad. No enuresis. Stopped HCT and only takes for puffiness. Feels that effexor 37.5 mg helps calm her.       Review of Systems   Constitutional: Negative for fever and unexpected weight change.   Respiratory: Negative for shortness of breath.    Cardiovascular: Negative for chest pain, palpitations and leg swelling.   Gastrointestinal: Negative for abdominal distention and abdominal pain.       Objective:     Blood pressure 120/62, height 5' 2" (1.575 m), weight 67.4 kg (148 lb 7.7 oz).      Physical Exam   Constitutional: She appears well-developed and well-nourished. No distress.   Neck: No thyromegaly present.   Bilat cervical triangle scars   Cardiovascular: Normal rate, regular rhythm, normal heart sounds and intact distal pulses.    No murmur heard.  Pulmonary/Chest: Effort normal and breath sounds normal. No respiratory distress.   Musculoskeletal: She exhibits no edema.   Lymphadenopathy:     She has no cervical adenopathy.   Neurological: She is alert.   Skin:   Wart on right cheek and right brow.    Psychiatric: She has a normal mood and affect.       Assessment:       1. Anxiety    2. Essential hypertension    3. Stress incontinence    4. Primary osteoarthritis involving multiple joints    5. Insomnia due to medical condition    6. Intermediate stage nonexudative age-related macular degeneration of both eyes        Plan:       Same meds. Use pads. Kegel exercise.   Cryotherapy to lesion as described.  Liquid nitrogen with 2 freeze thaw cycles. Intent is total destruction. Home care discussed.        "

## 2018-04-17 ENCOUNTER — TELEPHONE (OUTPATIENT)
Dept: FAMILY MEDICINE | Facility: CLINIC | Age: 83
End: 2018-04-17

## 2018-04-17 NOTE — TELEPHONE ENCOUNTER
Josefina at [facility] states that they can only fill a 30 day supply per refill. Please update script to reflect that.

## 2018-04-17 NOTE — TELEPHONE ENCOUNTER
----- Message from Carol Jacobo sent at 4/17/2018  3:52 PM CDT -----  Type:  Pharmacy Calling to Clarify an RX    Name of Caller:edilson  [facility]  Prescription Name: norco  What do they need to clarify?: new script  Best Call Back Number: [phone]  Ext  [phone]  Additional Information:    Calling   About   The    Script  Being   Filled for  narco

## 2018-04-18 RX ORDER — HYDROCODONE BITARTRATE AND ACETAMINOPHEN 5; 325 MG/1; MG/1
0.5 TABLET ORAL 2 TIMES DAILY PRN
Qty: 30 TABLET | Refills: 0 | Status: SHIPPED | OUTPATIENT
Start: 2018-04-18 | End: 2018-06-01 | Stop reason: SDUPTHER

## 2018-06-01 RX ORDER — HYDROCODONE BITARTRATE AND ACETAMINOPHEN 5; 325 MG/1; MG/1
0.5 TABLET ORAL 2 TIMES DAILY PRN
Qty: 30 TABLET | Refills: 0 | Status: SHIPPED | OUTPATIENT
Start: 2018-06-01 | End: 2018-08-09 | Stop reason: SDUPTHER

## 2018-06-01 NOTE — TELEPHONE ENCOUNTER
----- Message from Gabriela Richmond sent at 6/1/2018 10:47 AM CDT -----  Contact: Patient  Type:  RX Refill Request    Who Called:  patient  Refill or New Rx:  refill  RX Name and Strength:  hydrocodone-acetaminophen 5-325mg (NORCO) 5-325 mg per tablet  How is the patient currently taking it? (ex. 1XDay):    Is this a 30 day or 90 day RX:    Preferred Pharmacy with phone number:  [facility] pharmacy  Local or Mail Order:  Mail Order  Ordering Provider:  Dr.Jens Mahmood Call Back Number: [phone]   Requesting a call back when script has been sent  Additional Information:

## 2018-06-26 RX ORDER — MELOXICAM 15 MG/1
TABLET ORAL
Qty: 90 TABLET | Refills: 2 | Status: SHIPPED | OUTPATIENT
Start: 2018-06-26 | End: 2018-10-24 | Stop reason: ALTCHOICE

## 2018-07-24 RX ORDER — VENLAFAXINE HYDROCHLORIDE 37.5 MG/1
CAPSULE, EXTENDED RELEASE ORAL
Qty: 90 CAPSULE | Refills: 0 | Status: SHIPPED | OUTPATIENT
Start: 2018-07-24 | End: 2018-09-27 | Stop reason: SDUPTHER

## 2018-08-01 ENCOUNTER — PES CALL (OUTPATIENT)
Dept: ADMINISTRATIVE | Facility: CLINIC | Age: 83
End: 2018-08-01

## 2018-08-09 RX ORDER — HYDROCODONE BITARTRATE AND ACETAMINOPHEN 5; 325 MG/1; MG/1
0.5 TABLET ORAL 2 TIMES DAILY PRN
Qty: 30 TABLET | Refills: 0 | Status: SHIPPED | OUTPATIENT
Start: 2018-08-09 | End: 2018-10-24

## 2018-08-09 NOTE — TELEPHONE ENCOUNTER
----- Message from Belkys Chester sent at 8/9/2018 12:55 PM CDT -----  Contact: Patient  Bhakti, patient [phone] calling because she needs a refill on HYDROcodone-acetaminophen (NORCO) 5-325 mg per tablet. Please advise. Thanks.      [facility]  [address] [address]  Phone: [phone] Fax: [phone]

## 2018-08-23 ENCOUNTER — OFFICE VISIT (OUTPATIENT)
Dept: FAMILY MEDICINE | Facility: CLINIC | Age: 83
End: 2018-08-23
Payer: MEDICARE

## 2018-08-23 VITALS
BODY MASS INDEX: 27.87 KG/M2 | HEART RATE: 81 BPM | OXYGEN SATURATION: 95 % | WEIGHT: 151.44 LBS | HEIGHT: 62 IN | SYSTOLIC BLOOD PRESSURE: 136 MMHG | TEMPERATURE: 98 F | DIASTOLIC BLOOD PRESSURE: 70 MMHG

## 2018-08-23 DIAGNOSIS — F41.9 ANXIETY: ICD-10-CM

## 2018-08-23 DIAGNOSIS — E03.4 HYPOTHYROIDISM DUE TO ACQUIRED ATROPHY OF THYROID: ICD-10-CM

## 2018-08-23 DIAGNOSIS — R63.8 OTHER SYMPTOMS AND SIGNS CONCERNING FOOD AND FLUID INTAKE: ICD-10-CM

## 2018-08-23 DIAGNOSIS — I10 ESSENTIAL HYPERTENSION: Primary | ICD-10-CM

## 2018-08-23 DIAGNOSIS — G47.01 INSOMNIA DUE TO MEDICAL CONDITION: ICD-10-CM

## 2018-08-23 DIAGNOSIS — R53.83 FATIGUE, UNSPECIFIED TYPE: ICD-10-CM

## 2018-08-23 DIAGNOSIS — B07.9 WART OF FACE: ICD-10-CM

## 2018-08-23 DIAGNOSIS — I77.1 TORTUOUS AORTA: ICD-10-CM

## 2018-08-23 DIAGNOSIS — K59.09 CHRONIC CONSTIPATION: ICD-10-CM

## 2018-08-23 PROCEDURE — 17000 DESTRUCT PREMALG LESION: CPT | Mod: S$GLB,,, | Performed by: FAMILY MEDICINE

## 2018-08-23 PROCEDURE — 99214 OFFICE O/P EST MOD 30 MIN: CPT | Mod: 25,S$GLB,, | Performed by: FAMILY MEDICINE

## 2018-08-23 PROCEDURE — 99999 PR PBB SHADOW E&M-EST. PATIENT-LVL III: CPT | Mod: PBBFAC,,, | Performed by: FAMILY MEDICINE

## 2018-08-23 PROCEDURE — 3075F SYST BP GE 130 - 139MM HG: CPT | Mod: CPTII,S$GLB,, | Performed by: FAMILY MEDICINE

## 2018-08-23 PROCEDURE — 99499 UNLISTED E&M SERVICE: CPT | Mod: HCNC,S$GLB,, | Performed by: FAMILY MEDICINE

## 2018-08-23 PROCEDURE — 3078F DIAST BP <80 MM HG: CPT | Mod: CPTII,S$GLB,, | Performed by: FAMILY MEDICINE

## 2018-08-23 RX ORDER — OXYBUTYNIN CHLORIDE 5 MG/1
5 TABLET ORAL 3 TIMES DAILY
COMMUNITY
End: 2018-08-23

## 2018-08-23 RX ORDER — PRIMIDONE 50 MG/1
50 TABLET ORAL NIGHTLY
Qty: 90 TABLET | Refills: 1 | Status: SHIPPED | OUTPATIENT
Start: 2018-08-23 | End: 2018-09-07 | Stop reason: SINTOL

## 2018-08-23 RX ORDER — TEMAZEPAM 15 MG/1
15 CAPSULE ORAL NIGHTLY PRN
Qty: 90 CAPSULE | Refills: 1 | Status: SHIPPED | OUTPATIENT
Start: 2018-08-23 | End: 2018-11-01

## 2018-08-23 RX ORDER — OXYBUTYNIN CHLORIDE 5 MG/1
5 TABLET ORAL DAILY
Start: 2018-08-23 | End: 2018-11-21

## 2018-08-23 NOTE — PROGRESS NOTES
"Subjective:       Patient ID: Bhakti Phipps is a 84 y.o. female.    Chief Complaint: Follow-up    Follow-up hypertension, essential tremor, hypothyroidism.  She is due for some lab work.  She has chronic constipation and has been taking a magnesium supplement 400 mg.  It has been helpful.  She has an odor to her urine but no dysuria.  She does have chronic urge incontinence.  She has cut down on her oxybutynin to once a day because of dry mouth.  She has chronic insomnia.  She takes temazepam 30 mg.  She would like to cut back to 15 mg.  Her most difficult time with sleeping was around the time that her  .  Her tremor has not been as well controlled and she complains of some fatigue that she relates to the propranolol.      Review of Systems   Constitutional: Positive for fatigue and unexpected weight change (She has gained 2 or 3 lb). Negative for appetite change and fever.   Respiratory: Negative for cough, chest tightness and shortness of breath.    Cardiovascular: Negative for chest pain, palpitations and leg swelling.        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  She does not report any claudication.   Gastrointestinal: Negative for abdominal distention and abdominal pain.   Endocrine: Negative for cold intolerance.   Genitourinary:        Long-term incontinent       Objective:     Blood pressure 136/70, pulse 81, temperature 98.3 °F (36.8 °C), temperature source Oral, height 5' 2" (1.575 m), weight 68.7 kg (151 lb 7.3 oz), SpO2 95 %.      Physical Exam   Constitutional: She appears well-developed and well-nourished. No distress.   Cardiovascular: Normal rate, regular rhythm, normal heart sounds and intact distal pulses.   No murmur heard.  Pulmonary/Chest: Effort normal and breath sounds normal. No respiratory distress.   Neurological: She is alert.   Facial tremor   Skin:   3 mm rough filamentous add papule on the bridge of her nose.  She Has a well-healed scar with no " sign of cancer recurrence.  There is a seborrheic keratosis next to the scar.       Assessment:       1. Essential hypertension    2. Hypothyroidism due to acquired atrophy of thyroid    3. Anxiety    4. Chronic constipation    5. Insomnia due to medical condition    6. Tortuous aorta    7. Fatigue, unspecified type    8. Other symptoms and signs concerning food and fluid intake     9. Wart of face        Plan:       Stop propranolol.  Trial of primidone 50 mg at night.  Reduce temazepam to 15 mg.  Lab work ordered.  Cryotherapy to lesion as described.  Liquid nitrogen with 2 freeze thaw cycles. Intent is total destruction. Home care discussed.     return to clinic in 2-3 months for tremor

## 2018-08-28 ENCOUNTER — LAB VISIT (OUTPATIENT)
Dept: LAB | Facility: HOSPITAL | Age: 83
End: 2018-08-28
Attending: FAMILY MEDICINE
Payer: MEDICARE

## 2018-08-28 DIAGNOSIS — R63.8 OTHER SYMPTOMS AND SIGNS CONCERNING FOOD AND FLUID INTAKE: ICD-10-CM

## 2018-08-28 DIAGNOSIS — I10 ESSENTIAL HYPERTENSION: ICD-10-CM

## 2018-08-28 LAB
ALBUMIN SERPL BCP-MCNC: 4.2 G/DL
ALP SERPL-CCNC: 78 U/L
ALT SERPL W/O P-5'-P-CCNC: 15 U/L
ANION GAP SERPL CALC-SCNC: 9 MMOL/L
AST SERPL-CCNC: 14 U/L
BILIRUB SERPL-MCNC: 0.4 MG/DL
BUN SERPL-MCNC: 26 MG/DL
CALCIUM SERPL-MCNC: 10 MG/DL
CHLORIDE SERPL-SCNC: 102 MMOL/L
CHOLEST SERPL-MCNC: 189 MG/DL
CHOLEST/HDLC SERPL: 4.1 {RATIO}
CO2 SERPL-SCNC: 28 MMOL/L
CREAT SERPL-MCNC: 0.8 MG/DL
ERYTHROCYTE [DISTWIDTH] IN BLOOD BY AUTOMATED COUNT: 14.1 %
EST. GFR  (AFRICAN AMERICAN): >60 ML/MIN/1.73 M^2
EST. GFR  (NON AFRICAN AMERICAN): >60 ML/MIN/1.73 M^2
GLUCOSE SERPL-MCNC: 93 MG/DL
HCT VFR BLD AUTO: 36.9 %
HDLC SERPL-MCNC: 46 MG/DL
HDLC SERPL: 24.3 %
HGB BLD-MCNC: 11.2 G/DL
IRON SERPL-MCNC: 69 UG/DL
LDLC SERPL CALC-MCNC: 114.8 MG/DL
MAGNESIUM SERPL-MCNC: 2.7 MG/DL
MCH RBC QN AUTO: 28.8 PG
MCHC RBC AUTO-ENTMCNC: 30.4 G/DL
MCV RBC AUTO: 95 FL
NONHDLC SERPL-MCNC: 143 MG/DL
PLATELET # BLD AUTO: 339 K/UL
PMV BLD AUTO: 10.5 FL
POTASSIUM SERPL-SCNC: 5 MMOL/L
PROT SERPL-MCNC: 7.4 G/DL
RBC # BLD AUTO: 3.89 M/UL
SATURATED IRON: 18 %
SODIUM SERPL-SCNC: 139 MMOL/L
TOTAL IRON BINDING CAPACITY: 382 UG/DL
TRANSFERRIN SERPL-MCNC: 258 MG/DL
TRIGL SERPL-MCNC: 141 MG/DL
TSH SERPL DL<=0.005 MIU/L-ACNC: 3.02 UIU/ML
WBC # BLD AUTO: 8.8 K/UL

## 2018-08-28 PROCEDURE — 84443 ASSAY THYROID STIM HORMONE: CPT

## 2018-08-28 PROCEDURE — 80061 LIPID PANEL: CPT

## 2018-08-28 PROCEDURE — 83540 ASSAY OF IRON: CPT

## 2018-08-28 PROCEDURE — 80053 COMPREHEN METABOLIC PANEL: CPT

## 2018-08-28 PROCEDURE — 85027 COMPLETE CBC AUTOMATED: CPT

## 2018-08-28 PROCEDURE — 83735 ASSAY OF MAGNESIUM: CPT

## 2018-08-28 PROCEDURE — 36415 COLL VENOUS BLD VENIPUNCTURE: CPT | Mod: PN

## 2018-09-05 RX ORDER — HYDROCHLOROTHIAZIDE 12.5 MG/1
TABLET ORAL
Qty: 90 TABLET | Refills: 3 | Status: SHIPPED | OUTPATIENT
Start: 2018-09-05 | End: 2019-06-24 | Stop reason: SDUPTHER

## 2018-09-07 ENCOUNTER — TELEPHONE (OUTPATIENT)
Dept: FAMILY MEDICINE | Facility: CLINIC | Age: 83
End: 2018-09-07

## 2018-09-07 RX ORDER — PROPRANOLOL HYDROCHLORIDE 10 MG/1
10 TABLET ORAL 2 TIMES DAILY
Qty: 60 TABLET | Refills: 11
Start: 2018-09-07 | End: 2018-11-21

## 2018-09-07 NOTE — TELEPHONE ENCOUNTER
----- Message from Matt Blankenship sent at 9/7/2018 11:33 AM CDT -----  Contact: same  Patient called in and stated Dr. Jauregui changed her medication for her tremors to primidone (MYSOLINE) 50 MG Tab but it makes patient dizzy & blurred vision.  Patient would like to go back to her previous medication, Propranolol 10 mg.      Patient just wanted to let Dr. Jauregui know.  Patient stated she has some of the Propranolol left so now does not need a refill.    Patient call back [phone]

## 2018-09-07 NOTE — TELEPHONE ENCOUNTER
Patient called in and states that she does not like the primidone. States that it is making her dizzy and blurred vision. She would like to go back to the propanolol 10 mg. States that she does not refill; she has some left. Please advise

## 2018-09-10 NOTE — TELEPHONE ENCOUNTER
Patient notified and states that she has not received any information on lab results and urine test. Have patient scheduled to see you on 9/24/18 for follow up and flu vaccine.

## 2018-09-27 RX ORDER — VENLAFAXINE HYDROCHLORIDE 37.5 MG/1
CAPSULE, EXTENDED RELEASE ORAL
Qty: 90 CAPSULE | Refills: 3 | Status: SHIPPED | OUTPATIENT
Start: 2018-09-27 | End: 2018-11-10

## 2018-10-08 RX ORDER — LEVOTHYROXINE SODIUM 125 UG/1
TABLET ORAL
Qty: 90 TABLET | Refills: 3 | Status: SHIPPED | OUTPATIENT
Start: 2018-10-08 | End: 2019-07-29 | Stop reason: SDUPTHER

## 2018-10-24 ENCOUNTER — TELEPHONE (OUTPATIENT)
Dept: FAMILY MEDICINE | Facility: CLINIC | Age: 83
End: 2018-10-24

## 2018-10-24 NOTE — TELEPHONE ENCOUNTER
----- Message from Mode Doss sent at 10/24/2018  8:14 AM CDT -----  Type:  Same Day Appointment Request    Caller is requesting a same day appointment.  Caller declined first available appointment listed below.      Name of Caller: self   When is the first available appointment?  11/2018 Symptoms:    Best Call Back Number:  [phone]  Additional Information:   Patient asking to be seen today for poss shingles. Patient asking to see a doctor at the [facility]

## 2018-10-24 NOTE — TELEPHONE ENCOUNTER
Spoke with patient and advised that there are no available appts in [place] today. Offered Dee but refused. Advised that she can got to [facility] in [place]. Patient states that she will go there

## 2018-10-31 ENCOUNTER — TELEPHONE (OUTPATIENT)
Dept: FAMILY MEDICINE | Facility: CLINIC | Age: 83
End: 2018-10-31

## 2018-10-31 NOTE — TELEPHONE ENCOUNTER
----- Message from Gely Perez sent at 10/31/2018  8:36 AM CDT -----  Type:  Same Day Appointment Request    Caller is requesting a same day appointment.  name of Caller:  self  When is the first available appointment?  11/01  Symptoms:  Shingles   Best Call Back Number:  [phone] (home)   Additional Information:   Asking to be seen today

## 2018-11-01 ENCOUNTER — OFFICE VISIT (OUTPATIENT)
Dept: FAMILY MEDICINE | Facility: CLINIC | Age: 83
End: 2018-11-01
Payer: MEDICARE

## 2018-11-01 VITALS
SYSTOLIC BLOOD PRESSURE: 140 MMHG | TEMPERATURE: 98 F | OXYGEN SATURATION: 95 % | HEART RATE: 72 BPM | HEIGHT: 62 IN | BODY MASS INDEX: 27.67 KG/M2 | WEIGHT: 150.38 LBS | DIASTOLIC BLOOD PRESSURE: 62 MMHG

## 2018-11-01 DIAGNOSIS — B02.23 POST-HERPETIC POLYNEUROPATHY: Primary | ICD-10-CM

## 2018-11-01 PROCEDURE — 99214 OFFICE O/P EST MOD 30 MIN: CPT | Mod: PBBFAC,HCWC,PN | Performed by: NURSE PRACTITIONER

## 2018-11-01 PROCEDURE — 1101F PT FALLS ASSESS-DOCD LE1/YR: CPT | Mod: CPTII,HCWC,S$GLB, | Performed by: NURSE PRACTITIONER

## 2018-11-01 PROCEDURE — 3077F SYST BP >= 140 MM HG: CPT | Mod: CPTII,HCWC,S$GLB, | Performed by: NURSE PRACTITIONER

## 2018-11-01 PROCEDURE — 3078F DIAST BP <80 MM HG: CPT | Mod: CPTII,HCWC,S$GLB, | Performed by: NURSE PRACTITIONER

## 2018-11-01 PROCEDURE — 99999 PR PBB SHADOW E&M-EST. PATIENT-LVL IV: CPT | Mod: PBBFAC,HCWC,, | Performed by: NURSE PRACTITIONER

## 2018-11-01 PROCEDURE — 99214 OFFICE O/P EST MOD 30 MIN: CPT | Mod: HCWC,S$GLB,, | Performed by: NURSE PRACTITIONER

## 2018-11-01 RX ORDER — TEMAZEPAM 30 MG/1
30 CAPSULE ORAL NIGHTLY PRN
COMMUNITY
End: 2018-11-21 | Stop reason: SDUPTHER

## 2018-11-01 RX ORDER — GABAPENTIN 100 MG/1
CAPSULE ORAL
Qty: 45 CAPSULE | Refills: 0 | Status: SHIPPED | OUTPATIENT
Start: 2018-11-01 | End: 2018-11-08

## 2018-11-01 RX ORDER — KETOROLAC TROMETHAMINE 10 MG/1
10 TABLET, FILM COATED ORAL EVERY 6 HOURS
Qty: 16 TABLET | Refills: 1 | Status: SHIPPED | OUTPATIENT
Start: 2018-11-01 | End: 2018-11-08

## 2018-11-01 NOTE — PROGRESS NOTES
This dictation has been generated using Modal Fluency Dictation some phonetic errors may occur. Please contact author for clarification if needed.     Problem List Items Addressed This Visit     None      Visit Diagnoses     Post-herpetic polyneuropathy    -  Primary          Orders Placed This Encounter    gabapentin (NEURONTIN) 100 MG capsule    ketorolac (TORADOL) 10 mg tablet       Post hepatic neuralgia.  Continue use of gabapentin patient may use once or twice a day.  If she finds is making her sleepy she can do to at night.  Refill Toradol.  Patient does not have any renal disease.    Follow-up if symptoms worsen or fail to improve.    ________________________________________________________________  ________________________________________________________________      Chief Complaint   Patient presents with    Rash    Pleurisy    Medication Refill     gabapentin and toradol     History of present illness  This 84 y.o. presents today for complaint of rash and pleuritic chest pain. Patient is new to me.  Follows with 1 of my partners.  Patient notes history of shingles.  She did have Valtrex and completed that within the past week or so.  She has a prescription of gabapentin and has a couple of doses left.  She notes rash on the posterior chest and affecting the left breast.  No new rash.  Patient notes pain with movement deep breath or touching the affected area.  Does not note cold or heat sensitivity.  Complete meds as directed.  Denies any side effects.  Pain is fairly well controlled.  She does note feeling a little sleepy in the afternoon which is new.  We discussed gabapentin contributing to sleepiness.  She can adjust the dosing and see if that helps.  Review of systems  No fever or chills  Denies headache or cold symptoms  No chest pain on exertion.  No shortness of breath with exertion.  No rash elsewhere  Past Medical History:   Diagnosis Date    Anxiety     Arthritis     History of melanoma      at age of 26    HTN (hypertension) 2012    Hypertension     Hypothyroid 2012    Hypothyroidism     Insomnia 2014    Melanoma     age 26    Osteoarthritis 2012    Tremor        Past Surgical History:   Procedure Laterality Date    CHOLECYSTECTOMY  10-11    lap ayse    EXCISION, CARCINOMA Left 2012    Per[facility]    HYSTERECTOMY      with oopherectomy    JOINT REPLACEMENT      L knee    RADICAL NECK DISSECTION      for melanoma       Family History   Problem Relation Age of Onset    Cancer Mother         pancreatic    Cancer Father         leukemia    Cancer Brother         leukemia    Cancer Sister         liver cancer       Social History     Socioeconomic History    Marital status:      Spouse name: None    Number of children: None    Years of education: None    Highest education level: None   Social Needs    Financial resource strain: None    Food insecurity - worry: None    Food insecurity - inability: None    Transportation needs - medical: None    Transportation needs - non-medical: None   Occupational History    None   Tobacco Use    Smoking status: Former Smoker     Last attempt to quit: 1975     Years since quittin.4    Smokeless tobacco: Never Used   Substance and Sexual Activity    Alcohol use: No    Drug use: No    Sexual activity: No   Other Topics Concern    None   Social History Narrative    None       Current Outpatient Medications   Medication Sig Dispense Refill    gabapentin (NEURONTIN) 100 MG capsule TK 1 C PO BID 45 capsule 0    hydroCHLOROthiazide (HYDRODIURIL) 12.5 MG Tab TAKE 1 TABLET EVERY DAY 90 tablet 3    LACTOBAC CMB #3/FOS/PANTETHINE (PROBIOTIC & ACIDOPHILUS ORAL) Take 1 capsule by mouth once daily.       levothyroxine (SYNTHROID) 125 MCG tablet TAKE 1 TABLET EVERY DAY 90 tablet 3    LORazepam (ATIVAN) 1 MG tablet Take 1 tablet (1 mg total) by mouth every  12 (twelve) hours as needed for Anxiety. 90 tablet 1    magnesium oxide 500 mg Tab Take 1 tablet by mouth once daily.      oxybutynin (DITROPAN) 5 MG Tab Take 1 tablet (5 mg total) by mouth once daily.      propranolol (INDERAL) 10 MG tablet Take 1 tablet (10 mg total) by mouth 2 (two) times daily. 60 tablet 11    temazepam (RESTORIL) 30 mg capsule Take 30 mg by mouth nightly as needed for Insomnia.      venlafaxine (EFFEXOR-XR) 37.5 MG 24 hr capsule TAKE 1 CAPSULE EVERY DAY (STOP CYMBALTA) 90 capsule 3    VIT C/E/ZN/COPPR/LUTEIN/ZEAXAN (PRESERVISION AREDS 2 ORAL) Take by mouth.      FLUZONE HIGH-DOSE 2018-19, PF, 180 mcg/0.5 mL vaccine TO BE ADMINISTERED BY PHARMACIST FOR IMMUNIZATION  0    ketorolac (TORADOL) 10 mg tablet Take 1 tablet (10 mg total) by mouth every 6 (six) hours. 16 tablet 1    triamcinolone acetonide 0.1% (KENALOG) 0.1 % cream CINDY EXT AA BID FOR 10 DAYS PRN  0     No current facility-administered medications for this visit.        Review of patient's allergies indicates:   Allergen Reactions    Sulfa (sulfonamide antibiotics) Swelling       Physical examination  Vitals Reviewed  Gen. Well-dressed well-nourished   Skin warm dry and intact.  Resolving rash noted on the left scapula and the left chest wall.  Neck is supple without adenopathy  Chest.  Respirations are even unlabored.  Lungs are clear to auscultation.  Cardiac regular rate and rhythm.  No chest wall adenopathy noted.  Neuro. Awake alert oriented x4.  Normal judgment and cognition noted.  Extremities no clubbing cyanosis or edema noted.     Call or return to clinic prn if these symptoms worsen or fail to improve as anticipated.

## 2018-11-08 ENCOUNTER — OFFICE VISIT (OUTPATIENT)
Dept: FAMILY MEDICINE | Facility: CLINIC | Age: 83
End: 2018-11-08
Payer: MEDICARE

## 2018-11-08 ENCOUNTER — TELEPHONE (OUTPATIENT)
Dept: FAMILY MEDICINE | Facility: CLINIC | Age: 83
End: 2018-11-08

## 2018-11-08 VITALS
SYSTOLIC BLOOD PRESSURE: 152 MMHG | HEART RATE: 77 BPM | DIASTOLIC BLOOD PRESSURE: 66 MMHG | WEIGHT: 150.13 LBS | BODY MASS INDEX: 27.46 KG/M2

## 2018-11-08 DIAGNOSIS — F41.9 ANXIETY: ICD-10-CM

## 2018-11-08 DIAGNOSIS — I10 ESSENTIAL HYPERTENSION: ICD-10-CM

## 2018-11-08 DIAGNOSIS — M62.830 SPASM OF THORACIC BACK MUSCLE: Primary | ICD-10-CM

## 2018-11-08 PROCEDURE — 3078F DIAST BP <80 MM HG: CPT | Mod: CPTII,HCWC,S$GLB, | Performed by: FAMILY MEDICINE

## 2018-11-08 PROCEDURE — 3077F SYST BP >= 140 MM HG: CPT | Mod: CPTII,HCWC,S$GLB, | Performed by: FAMILY MEDICINE

## 2018-11-08 PROCEDURE — 1101F PT FALLS ASSESS-DOCD LE1/YR: CPT | Mod: CPTII,HCWC,S$GLB, | Performed by: FAMILY MEDICINE

## 2018-11-08 PROCEDURE — 99214 OFFICE O/P EST MOD 30 MIN: CPT | Mod: HCWC,S$GLB,, | Performed by: FAMILY MEDICINE

## 2018-11-08 PROCEDURE — 99999 PR PBB SHADOW E&M-EST. PATIENT-LVL III: CPT | Mod: PBBFAC,HCWC,, | Performed by: FAMILY MEDICINE

## 2018-11-08 RX ORDER — TIZANIDINE 2 MG/1
4 TABLET ORAL EVERY 8 HOURS PRN
Qty: 30 TABLET | Refills: 1 | Status: SHIPPED | OUTPATIENT
Start: 2018-11-08 | End: 2018-11-18

## 2018-11-08 RX ORDER — HYDROCODONE BITARTRATE AND ACETAMINOPHEN 5; 325 MG/1; MG/1
1 TABLET ORAL EVERY 8 HOURS PRN
Qty: 30 TABLET | Refills: 0 | Status: ON HOLD | OUTPATIENT
Start: 2018-11-08 | End: 2018-11-12 | Stop reason: HOSPADM

## 2018-11-08 NOTE — PATIENT INSTRUCTIONS
Use SalonPas lidocaine patch as needed. Stop gabapentin for now.   Lortab 5 mg if needed for severe pain.  Zanaflex 2 mg for muscle spasm.

## 2018-11-08 NOTE — TELEPHONE ENCOUNTER
----- Message from Gely Perez sent at 11/8/2018  8:07 AM CST -----  Type:  Same Day Appointment Request    Caller is requesting a same day appointment.     Name of Caller:  self  When is the first available appointment?     Symptoms:  Severe back spasms pain  Best Call Back Number:  [phone]    Additional Information:   Asking to be seen today

## 2018-11-08 NOTE — PROGRESS NOTES
Subjective:       Patient ID: Bhatki Phipps is a 84 y.o. female.    Chief Complaint: Back Problem (spasms)    Follow-up thoracic back pain. She was seen in urgent care with the probable diagnosis of shingles.  It was based on some bumps in her left posterior thoracic area and some radiating pain around to the left side.  She was treated with Valtrex.  She also had an emergency room visit in that time course with the possible diagnosis of chest wall pain versus pleurisy.  Her chest x-ray was normal.  She was then seen by Srikanth Peterson.  He prescribed tramadol and gabapentin.  Her left thoracic pain has improved and is not much of a problem now.  She now complains of spasms and sharp pains in the right posterior thoracic and lateral thoracic areas.  This is something she is had often on for several years.  I reviewed some of the notes.  She was seen by Kishore Brenner and then later on by Dr. Beck.  The diagnosis had to do with thoracic muscle spasms.  She does not remember that the trigger point injections helped very much.  She was also given a course of Medrol Dosepak.  The symptoms gradually went away.  The current symptoms are very similar to what she had before.  She is wondered if they were triggered by her shingles episode.  The pain is worse with deep breathing and with certain movements.  It has interfered with her sleep.      Review of Systems   Constitutional: Negative for fever and unexpected weight change.   Respiratory: Negative for shortness of breath.    Cardiovascular: Positive for chest pain. Negative for palpitations.   Genitourinary: Negative for hematuria.       Objective:     Blood pressure (!) 152/66, pulse 77, weight 68.1 kg (150 lb 2.1 oz).      Physical Exam   Constitutional: She appears well-developed and well-nourished. She appears distressed.   Cardiovascular: Normal rate.   Pulmonary/Chest: Effort normal and breath sounds normal.   Good breath sounds bilaterally. No rubs.    Abdominal: Soft. Bowel sounds are normal. She exhibits no distension and no mass. There is no tenderness.   Musculoskeletal:   Right lower 11th and 12th rib tenderness. Some 9th and 10th rib tenderness. The pain can be reproduced by leaning forward and with side bend.   Neurological: She is alert.       Assessment:       1. Spasm of thoracic back muscle    2. Essential hypertension    3. Anxiety        Plan:       Zanaflex and Vicodin.  Salon Pas lidocaine.  Consider physical therapy.  Follow up with me in a few weeks.

## 2018-11-08 NOTE — TELEPHONE ENCOUNTER
Spoke to patient and scheduled same day appointment with Dr. Jauregui for 2pm today. Patient verbalized understanding.

## 2018-11-10 PROBLEM — D72.829 LEUKOCYTOSIS: Status: ACTIVE | Noted: 2018-11-10

## 2018-11-10 PROBLEM — R79.89 ELEVATED TROPONIN I LEVEL: Status: ACTIVE | Noted: 2018-11-10

## 2018-11-10 PROBLEM — E78.5 HYPERLIPIDEMIA: Status: ACTIVE | Noted: 2018-11-10

## 2018-11-10 PROBLEM — R79.89 ELEVATED TROPONIN: Status: ACTIVE | Noted: 2018-11-10

## 2018-11-10 PROBLEM — R07.9 CHEST PAIN: Status: ACTIVE | Noted: 2018-11-10

## 2018-11-10 PROBLEM — R11.2 NAUSEA AND VOMITING: Status: ACTIVE | Noted: 2018-11-10

## 2018-11-11 PROBLEM — R53.1 GENERAL WEAKNESS: Status: ACTIVE | Noted: 2018-11-11

## 2018-11-13 ENCOUNTER — TELEPHONE (OUTPATIENT)
Dept: FAMILY MEDICINE | Facility: CLINIC | Age: 83
End: 2018-11-13

## 2018-11-13 NOTE — TELEPHONE ENCOUNTER
I have attempted without success to contact this patient by phone to return their call and I left a message on answering machine.

## 2018-11-13 NOTE — TELEPHONE ENCOUNTER
----- Message from Matt Blankenship sent at 11/13/2018  3:41 PM CST -----  Contact: Nicky/[facility]  Nicky called in and stated she missed a call back from office regarding the attached patient.  Nicky stated she just needs to clarify some of the patients meds.  Nicky's call back number is [phone]

## 2018-11-13 NOTE — TELEPHONE ENCOUNTER
----- Message from [facility] sent at 11/13/2018  1:32 PM CST -----  Contact: Jessica [facility] Jessica is requesting a call back for a medicine review. Patient has meds in home and she is not sure if she is take them or not.  Call Nicky [phone].  Thanks

## 2018-11-13 NOTE — TELEPHONE ENCOUNTER
Shawanda quiñonez of medications on med list and per Dr. Jauregui.      Current Outpatient Medications:     hydroCHLOROthiazide (HYDRODIURIL) 12.5 MG Tab, TAKE 1 TABLET EVERY DAY, Disp: 90 tablet, Rfl: 3    levothyroxine (SYNTHROID) 125 MCG tablet, TAKE 1 TABLET EVERY DAY, Disp: 90 tablet, Rfl: 3    lidocaine (LIDODERM) 5 %, Place 1 patch onto the skin every 24 hours. Remove & Discard patch within 12 hours or as directed by MD, Disp: , Rfl:     LORazepam (ATIVAN) 1 MG tablet, Take 1 tablet (1 mg total) by mouth every 12 (twelve) hours as needed for Anxiety., Disp: 90 tablet, Rfl: 1    magnesium oxide 500 mg Tab, Take 1 tablet by mouth once daily., Disp: , Rfl:     oxybutynin (DITROPAN) 5 MG Tab, Take 1 tablet (5 mg total) by mouth once daily., Disp: , Rfl:     propranolol (INDERAL) 10 MG tablet, Take 1 tablet (10 mg total) by mouth 2 (two) times daily. (Patient taking differently: Take 10 mg by mouth 2 (two) times daily as needed (for tremors). ), Disp: 60 tablet, Rfl: 11    temazepam (RESTORIL) 30 mg capsule, Take 30 mg by mouth nightly as needed for Insomnia., Disp: , Rfl:     tiZANidine (ZANAFLEX) 2 MG tablet, Take 2 tablets (4 mg total) by mouth every 8 (eight) hours as needed., Disp: 30 tablet, Rfl: 1    VIT C/E/ZN/COPPR/LUTEIN/ZEAXAN (PRESERVISION AREDS 2 ORAL), Take by mouth., Disp: , Rfl:   No current facility-administered medications for this visit.

## 2018-11-16 ENCOUNTER — TELEPHONE (OUTPATIENT)
Dept: FAMILY MEDICINE | Facility: CLINIC | Age: 83
End: 2018-11-16

## 2018-11-16 RX ORDER — VENLAFAXINE HYDROCHLORIDE 37.5 MG/1
37.5 CAPSULE, EXTENDED RELEASE ORAL DAILY
Qty: 90 CAPSULE | Refills: 1 | Status: SHIPPED | OUTPATIENT
Start: 2018-11-16 | End: 2018-12-03

## 2018-11-16 NOTE — TELEPHONE ENCOUNTER
----- Message from Gabriela Richmond sent at 11/16/2018  3:46 PM CST -----  Contact: April  Type: Needs Medical Advice    Who Called:  Patient  Symptoms (please be specific):    How long has patient had these symptoms:    Pharmacy name and phone #:    Best Call Back Number: [phone]  Additional Information: requesting a call back regarding medication effexor,patient thought she need to stop taking it now she having withdraws,need to know if patient can continue with medication

## 2018-11-16 NOTE — TELEPHONE ENCOUNTER
Patient states that she was in hospital and has not been taking her effexor for 5 days and would like to know if she should continue taking it. States that she has just not been feeling well. Hospital did not give her this medication.

## 2018-11-16 NOTE — TELEPHONE ENCOUNTER
Patient was in hospital for 5 days and has not been taking her Effexor.  nurse states that she is having some withdrawal sx. States that she does not believe that patient was taken off the medication and patient is not sure why she was not given it in the hospital.  Message was sent to Dr Jauregui but not addressed and nurse is concerned. Asking for authorization to have patient start medication back.

## 2018-11-21 ENCOUNTER — OFFICE VISIT (OUTPATIENT)
Dept: FAMILY MEDICINE | Facility: CLINIC | Age: 83
End: 2018-11-21
Payer: MEDICARE

## 2018-11-21 VITALS
OXYGEN SATURATION: 98 % | WEIGHT: 146.81 LBS | SYSTOLIC BLOOD PRESSURE: 130 MMHG | BODY MASS INDEX: 27.02 KG/M2 | HEART RATE: 69 BPM | HEIGHT: 62 IN | DIASTOLIC BLOOD PRESSURE: 68 MMHG | TEMPERATURE: 98 F

## 2018-11-21 DIAGNOSIS — R11.2 NAUSEA AND VOMITING, INTRACTABILITY OF VOMITING NOT SPECIFIED, UNSPECIFIED VOMITING TYPE: ICD-10-CM

## 2018-11-21 DIAGNOSIS — F41.9 ANXIETY: ICD-10-CM

## 2018-11-21 DIAGNOSIS — I10 ESSENTIAL HYPERTENSION: Primary | ICD-10-CM

## 2018-11-21 DIAGNOSIS — R25.1 TREMOR: ICD-10-CM

## 2018-11-21 DIAGNOSIS — R07.2 PRECORDIAL PAIN: ICD-10-CM

## 2018-11-21 PROBLEM — R79.89 ELEVATED TROPONIN I LEVEL: Status: RESOLVED | Noted: 2018-11-10 | Resolved: 2018-11-21

## 2018-11-21 PROBLEM — R53.1 GENERAL WEAKNESS: Status: RESOLVED | Noted: 2018-11-11 | Resolved: 2018-11-21

## 2018-11-21 PROBLEM — D72.829 LEUKOCYTOSIS: Status: RESOLVED | Noted: 2018-11-10 | Resolved: 2018-11-21

## 2018-11-21 PROBLEM — R79.89 ELEVATED TROPONIN: Status: RESOLVED | Noted: 2018-11-10 | Resolved: 2018-11-21

## 2018-11-21 PROCEDURE — 1101F PT FALLS ASSESS-DOCD LE1/YR: CPT | Mod: CPTII,HCWC,S$GLB, | Performed by: FAMILY MEDICINE

## 2018-11-21 PROCEDURE — 99999 PR PBB SHADOW E&M-EST. PATIENT-LVL III: CPT | Mod: PBBFAC,HCWC,, | Performed by: FAMILY MEDICINE

## 2018-11-21 PROCEDURE — 3078F DIAST BP <80 MM HG: CPT | Mod: CPTII,HCWC,S$GLB, | Performed by: FAMILY MEDICINE

## 2018-11-21 PROCEDURE — 3075F SYST BP GE 130 - 139MM HG: CPT | Mod: CPTII,HCWC,S$GLB, | Performed by: FAMILY MEDICINE

## 2018-11-21 PROCEDURE — 99214 OFFICE O/P EST MOD 30 MIN: CPT | Mod: HCWC,S$GLB,, | Performed by: FAMILY MEDICINE

## 2018-11-21 RX ORDER — TEMAZEPAM 30 MG/1
30 CAPSULE ORAL NIGHTLY PRN
Qty: 90 CAPSULE | Refills: 1 | Status: SHIPPED | OUTPATIENT
Start: 2018-11-21 | End: 2018-12-17 | Stop reason: SDUPTHER

## 2018-11-21 RX ORDER — OXYBUTYNIN CHLORIDE 5 MG/1
5 TABLET ORAL 2 TIMES DAILY
Start: 2018-11-21 | End: 2019-05-16 | Stop reason: ALTCHOICE

## 2018-11-21 RX ORDER — PROPRANOLOL HYDROCHLORIDE 10 MG/1
10 TABLET ORAL 2 TIMES DAILY
Qty: 60 TABLET | Refills: 11
Start: 2018-11-21 | End: 2018-11-21 | Stop reason: ALTCHOICE

## 2018-11-21 RX ORDER — PRIMIDONE 50 MG/1
50 TABLET ORAL DAILY
Qty: 30 TABLET | Refills: 11
Start: 2018-11-21 | End: 2018-12-03

## 2018-11-21 NOTE — PROGRESS NOTES
"Subjective:       Patient ID: Bhakti Phipps is a 84 y.o. female.    Chief Complaint: Hospital Follow Up (possible shingles)    She is here with her daughter-in-law.  She was briefly hospitalized for repetitive vomiting and an elevated troponin.  She was being treated for posterior thoracic pain. She actually has a long past history of recurring back spasms.  They seem to happen in the same location every 6 months for the past several years.  She had taken some hydrocodone for the pain on an empty stomach and she feels that that triggered the vomiting.  Either during the hospitalization or after the hospitalization her Effexor was stopped.  She started feeling very strange and weak.  She has resumed Effexor and feels stronger.  She is eating normally now.  She had multiple questions on medications. several months ago I changed propranolol to primidone for her tremor.  She said that she was having fatigue with propranolol.  Early in the visit today she said she was taking propranolol once a day but then later in the visit she said she was no longer taking it.  She thinks she has been taking the primidone.  She thinks that it helps better than propranolol.  I reviewed her hospital results as well as discharge summary.      Review of Systems   Constitutional: Negative for fever and unexpected weight change.   Respiratory: Negative for chest tightness and shortness of breath.    Cardiovascular: Negative for chest pain and palpitations.   Gastrointestinal: Negative for abdominal pain.   Neurological: Positive for tremors.       Objective:     Blood pressure 130/68, pulse 69, temperature 97.6 °F (36.4 °C), temperature source Oral, height 5' 2" (1.575 m), weight 66.6 kg (146 lb 13.2 oz), SpO2 98 %.      Physical Exam   Constitutional: She appears well-developed and well-nourished. No distress.   Cardiovascular: Normal rate and regular rhythm.   No murmur heard.  Pulmonary/Chest: Effort normal and breath sounds normal. " No respiratory distress.   Abdominal: She exhibits no distension. There is no tenderness.   Neurological: She is alert.       Assessment:       1. Essential hypertension    2. Tremor    3. Anxiety    4. Precordial pain    5. Nausea and vomiting, intractability of vomiting not specified, unspecified vomiting type        Plan:       I reviewed her medications with her and clarified instructions.  Follow up with me in 1 month.

## 2018-12-03 ENCOUNTER — OFFICE VISIT (OUTPATIENT)
Dept: FAMILY MEDICINE | Facility: CLINIC | Age: 83
End: 2018-12-03
Payer: MEDICARE

## 2018-12-03 ENCOUNTER — TELEPHONE (OUTPATIENT)
Dept: FAMILY MEDICINE | Facility: CLINIC | Age: 83
End: 2018-12-03

## 2018-12-03 VITALS
BODY MASS INDEX: 26.78 KG/M2 | TEMPERATURE: 98 F | WEIGHT: 145.5 LBS | SYSTOLIC BLOOD PRESSURE: 152 MMHG | HEIGHT: 62 IN | HEART RATE: 113 BPM | OXYGEN SATURATION: 95 % | DIASTOLIC BLOOD PRESSURE: 80 MMHG

## 2018-12-03 DIAGNOSIS — I10 ESSENTIAL HYPERTENSION: Primary | ICD-10-CM

## 2018-12-03 DIAGNOSIS — F41.9 ANXIETY: ICD-10-CM

## 2018-12-03 DIAGNOSIS — R25.1 TREMOR: ICD-10-CM

## 2018-12-03 DIAGNOSIS — N39.3 STRESS INCONTINENCE: ICD-10-CM

## 2018-12-03 PROBLEM — R11.2 NAUSEA AND VOMITING: Status: RESOLVED | Noted: 2018-11-10 | Resolved: 2018-12-03

## 2018-12-03 PROBLEM — R07.9 CHEST PAIN: Status: RESOLVED | Noted: 2018-11-10 | Resolved: 2018-12-03

## 2018-12-03 PROCEDURE — 99999 PR PBB SHADOW E&M-EST. PATIENT-LVL IV: CPT | Mod: PBBFAC,HCWC,, | Performed by: FAMILY MEDICINE

## 2018-12-03 PROCEDURE — 1101F PT FALLS ASSESS-DOCD LE1/YR: CPT | Mod: CPTII,HCWC,S$GLB, | Performed by: FAMILY MEDICINE

## 2018-12-03 PROCEDURE — 99214 OFFICE O/P EST MOD 30 MIN: CPT | Mod: HCWC,S$GLB,, | Performed by: FAMILY MEDICINE

## 2018-12-03 PROCEDURE — 3079F DIAST BP 80-89 MM HG: CPT | Mod: CPTII,HCWC,S$GLB, | Performed by: FAMILY MEDICINE

## 2018-12-03 PROCEDURE — 3077F SYST BP >= 140 MM HG: CPT | Mod: CPTII,HCWC,S$GLB, | Performed by: FAMILY MEDICINE

## 2018-12-03 RX ORDER — PROPRANOLOL HYDROCHLORIDE 20 MG/1
20 TABLET ORAL 2 TIMES DAILY
Qty: 180 TABLET | Refills: 1 | Status: SHIPPED | OUTPATIENT
Start: 2018-12-03 | End: 2018-12-07 | Stop reason: SDUPTHER

## 2018-12-03 RX ORDER — VENLAFAXINE HYDROCHLORIDE 37.5 MG/1
37.5 CAPSULE, EXTENDED RELEASE ORAL DAILY
Qty: 90 CAPSULE | Refills: 1
Start: 2018-12-03 | End: 2019-05-16

## 2018-12-03 RX ORDER — MELOXICAM 15 MG/1
15 TABLET ORAL DAILY
COMMUNITY
End: 2019-02-28

## 2018-12-03 NOTE — PATIENT INSTRUCTIONS
Try taking oxybutinin only at night. It is to help with bladder urgency.   Do not take the muscle relaxers (methocarbamol or tizanidine) or gabapentin for now  Resume effexor XR 37.5 mg  Resume propranolol 20 mg twice a day.

## 2018-12-03 NOTE — TELEPHONE ENCOUNTER
----- Message from Jose Enrique Martins sent at 12/3/2018 10:05 AM CST -----  Type: Needs Medical Advice    Who Called:  [facility]/Liv Mahmood Call Back Number: [phone]  Additional Information: Call Liv

## 2018-12-03 NOTE — TELEPHONE ENCOUNTER
Spoke w/ Liv. She states that when pt was seen on 11/21/18 and Dr Jauregui updated pt's med list, her HH nurse did not get orders and advised the pt based on previous orders. Advised that pt has appt today to clarify meds and we will give the updated list to the pt.

## 2018-12-03 NOTE — TELEPHONE ENCOUNTER
"Spoke w/ pt. She states her  nurse told her on Wed, 11/28/18 to d/c her Primidone and Effexor. She states that the nurse told her someone from Dr Jauregui office told her to have pt d/c these and now she "feels awful" and wondering if she should have weaned off. No record on file of Dr Jauregui' d/c'ing these. Advised pt we will get more info from  nurse and call her back. Will offer appt today at 2pm, also.   Spoke w/ [facility] intake to get more info on who d/c'd these medications and what the reason, in case pt should be changed to something else by Dr Jauregui. She asked that we call pt's nurse, Nicky at [phone] to get more info. Tried to reach Nicky no answer. Phone disconnects before able to leave voicemail. Will try again later.  "

## 2018-12-03 NOTE — TELEPHONE ENCOUNTER
Spoke w/ pt. Appt made for today at 2pm to discuss w/ Dr Jauregui. Sheis going to bring all of her med bottles so Dr Jauregui can update what she is supposed to be taking.

## 2018-12-03 NOTE — TELEPHONE ENCOUNTER
----- Message from Ally Tello sent at 12/3/2018  8:23 AM CST -----  Contact: patient  Type: Needs Medical Advice    Who Called: patient  Symptoms (please be specific):  Not feeling like herself  How long has patient had these symptoms: for a few days    Best Call Back Number:[phone]  Additional Information: Home health nurse told patient to stop taking venlafaxine (EFFEXOR-XR) 37.5 MG 24 hr capsule and primidone (MYSOLINE) 50 MG Tab.  Home Health has stopped as of last Wednesday and patient states she is feeling awful.  Would like to see about coming in to see Dr Jauregui as soon as she can or at least speak to him regarding these medications.  Thank you

## 2018-12-03 NOTE — PROGRESS NOTES
"Subjective:       Patient ID: Bhakti Phipps is a 84 y.o. female.    Chief Complaint: Follow-up (medication verification)    She is here with her grandson.  She again feels dizzy and disoriented.  I reviewed emergency room and hospital records again.  I had seen her on November 21st.  She related that her Effexor and primidone had been stopped during her hospitalization on November 11th.  She had started feeling dizzy a key and woozy.  Those improved when she started back on her Effexor and primidone.  Apparently the home health nurse stopped those medicines 5 days ago.  She started feeling poorly again.  She has thrown the primidone away.  She still has a full bottle of Effexor XR 37.5 mg.  She also has a large bottle of ditropan 5 mg.  This sig says t.i.d..  She has been taking b.i.d..  She does acknowledge a dry mouth.  Her appetite is fair.  She did have 2 loose stools yesterday.  She has been taking a magnesium supplement.  She has regained her lost weight from her hospitalization.  She is bothered by her tremor.  She previously took propranolol.  I reviewed her home medication list.  She has been on temazepam for quite some time.  We tried reducing it to 15 mg but she was unable to sleep.  She has been tolerating the 30 mg dose.      Review of Systems   Constitutional: Positive for fatigue. Negative for chills and fever.   Respiratory: Negative for chest tightness and shortness of breath.    Cardiovascular: Negative for chest pain, palpitations and leg swelling.       Objective:     Blood pressure (!) 152/80, pulse (!) 113, temperature 97.6 °F (36.4 °C), temperature source Oral, height 5' 2" (1.575 m), weight 66 kg (145 lb 8.1 oz), SpO2 95 %.      Physical Exam   Constitutional: She appears well-developed and well-nourished.   She is a bit anxious   Cardiovascular:   Regular tachycardia with no murmur.   Pulmonary/Chest: Effort normal and breath sounds normal.   Musculoskeletal: She exhibits no edema. "   Neurological: She is alert.       Assessment:       1. Essential hypertension    2. Stress incontinence    3. Anxiety    4. Tremor        Plan:       Resume propranolol 20 mg twice a day.  Resume Effexor XR 37.5 mg daily.  Reduce ditropan to 5 mg at night.    see me back in 3-4 weeks.

## 2018-12-07 NOTE — TELEPHONE ENCOUNTER
Pt states she only p/u a 30 day supply since it was so expressive. Pt would like the rest of the fills sent to [facility] pharmacy. Please review and advise.

## 2018-12-07 NOTE — TELEPHONE ENCOUNTER
----- Message from García Cheng sent at 12/7/2018  1:03 PM CST -----  Contact: Patient  Type:  RX Refill Request    Who Called:  Patient  Refill or New Rx:  Refill  RX Name and Strength: propranolol (INDERAL) 20 MG tablet  How is the patient currently taking it? (ex. 1XDay):  Take 1 tablet (20 mg total) by mouth 2 (two) times daily. - Oral  Is this a 30 day or 90 day RX:  90  Preferred Pharmacy with phone number:    "[facility] - [place], [place] - [address]  [address] [address]  Phone: [phone] Fax: [phone]  Local or Mail Order:  Local  Ordering Provider:  Dr. Juventino Mahmood Call Back Number:  [phone]  Additional Information: Patient has picked up a 30 day supply due to price at [facility]. Please call to confirm. Thanks!

## 2018-12-08 RX ORDER — PROPRANOLOL HYDROCHLORIDE 20 MG/1
20 TABLET ORAL 2 TIMES DAILY
Qty: 180 TABLET | Refills: 1 | Status: SHIPPED | OUTPATIENT
Start: 2018-12-08 | End: 2019-04-24 | Stop reason: SDUPTHER

## 2018-12-18 RX ORDER — TEMAZEPAM 30 MG/1
CAPSULE ORAL
Qty: 90 CAPSULE | Refills: 1 | Status: SHIPPED | OUTPATIENT
Start: 2018-12-18 | End: 2019-11-26 | Stop reason: SDUPTHER

## 2019-01-22 RX ORDER — MELOXICAM 15 MG/1
TABLET ORAL
Qty: 90 TABLET | Refills: 2 | Status: SHIPPED | OUTPATIENT
Start: 2019-01-22 | End: 2019-02-28 | Stop reason: ALTCHOICE

## 2019-02-14 ENCOUNTER — PES CALL (OUTPATIENT)
Dept: ADMINISTRATIVE | Facility: CLINIC | Age: 84
End: 2019-02-14

## 2019-02-28 ENCOUNTER — OFFICE VISIT (OUTPATIENT)
Dept: FAMILY MEDICINE | Facility: CLINIC | Age: 84
End: 2019-02-28
Payer: MEDICARE

## 2019-02-28 VITALS
SYSTOLIC BLOOD PRESSURE: 158 MMHG | TEMPERATURE: 98 F | WEIGHT: 147.63 LBS | DIASTOLIC BLOOD PRESSURE: 72 MMHG | HEIGHT: 62 IN | BODY MASS INDEX: 27.17 KG/M2

## 2019-02-28 DIAGNOSIS — M54.50 LUMBAR PAIN: Primary | ICD-10-CM

## 2019-02-28 DIAGNOSIS — I10 ESSENTIAL HYPERTENSION: ICD-10-CM

## 2019-02-28 DIAGNOSIS — M41.25 OTHER IDIOPATHIC SCOLIOSIS, THORACOLUMBAR REGION: ICD-10-CM

## 2019-02-28 DIAGNOSIS — I77.1 TORTUOUS AORTA: ICD-10-CM

## 2019-02-28 PROCEDURE — 99999 PR PBB SHADOW E&M-EST. PATIENT-LVL III: CPT | Mod: PBBFAC,HCNC,, | Performed by: FAMILY MEDICINE

## 2019-02-28 PROCEDURE — 3078F PR MOST RECENT DIASTOLIC BLOOD PRESSURE < 80 MM HG: ICD-10-PCS | Mod: HCNC,CPTII,S$GLB, | Performed by: FAMILY MEDICINE

## 2019-02-28 PROCEDURE — 99214 PR OFFICE/OUTPT VISIT, EST, LEVL IV, 30-39 MIN: ICD-10-PCS | Mod: HCNC,S$GLB,, | Performed by: FAMILY MEDICINE

## 2019-02-28 PROCEDURE — 3077F PR MOST RECENT SYSTOLIC BLOOD PRESSURE >= 140 MM HG: ICD-10-PCS | Mod: HCNC,CPTII,S$GLB, | Performed by: FAMILY MEDICINE

## 2019-02-28 PROCEDURE — 99999 PR PBB SHADOW E&M-EST. PATIENT-LVL III: ICD-10-PCS | Mod: PBBFAC,HCNC,, | Performed by: FAMILY MEDICINE

## 2019-02-28 PROCEDURE — 1101F PT FALLS ASSESS-DOCD LE1/YR: CPT | Mod: HCNC,CPTII,S$GLB, | Performed by: FAMILY MEDICINE

## 2019-02-28 PROCEDURE — 1101F PR PT FALLS ASSESS DOC 0-1 FALLS W/OUT INJ PAST YR: ICD-10-PCS | Mod: HCNC,CPTII,S$GLB, | Performed by: FAMILY MEDICINE

## 2019-02-28 PROCEDURE — 3078F DIAST BP <80 MM HG: CPT | Mod: HCNC,CPTII,S$GLB, | Performed by: FAMILY MEDICINE

## 2019-02-28 PROCEDURE — 3077F SYST BP >= 140 MM HG: CPT | Mod: HCNC,CPTII,S$GLB, | Performed by: FAMILY MEDICINE

## 2019-02-28 PROCEDURE — 99214 OFFICE O/P EST MOD 30 MIN: CPT | Mod: HCNC,S$GLB,, | Performed by: FAMILY MEDICINE

## 2019-02-28 RX ORDER — NAPROXEN 500 MG/1
500 TABLET ORAL 2 TIMES DAILY PRN
Qty: 30 TABLET | Refills: 2 | Status: SHIPPED | OUTPATIENT
Start: 2019-02-28 | End: 2019-10-11 | Stop reason: SDUPTHER

## 2019-02-28 NOTE — PROGRESS NOTES
"Subjective:       Patient ID: Bhakti Phipps is a 84 y.o. female.    Chief Complaint: Back Pain (R side back pain, low back. Originally "higher up")    She is having right lumbar pain.  It is worse with certain movements.  No pain with sitting or lying down.  She does have pain with standing up and walking.  It has not responded to her by rowing some Lidoderm patches.  She was seen in ready med February 6th.  She was treated for a possible UTI because of trace red cells and white cells.  She was also given a Medrol Dosepak and Robaxin.  None of that helped her back pain.  Previously she had thoracic pain and had an emergency room visit for that plus it was mentioned in clinic visit the thoracic pain has resolved.  She had had previous thoracic back pain in 2016.  It had been treated with trigger point injections and prior to that she had had SUJATHA.  Her blood pressure is a bit higher today because of the pain.      Review of Systems   Constitutional: Negative for fever and unexpected weight change.   Respiratory: Negative for cough and shortness of breath.    Musculoskeletal: Positive for back pain.   Neurological: Negative for weakness and numbness.       Objective:     Blood pressure (!) 158/72, temperature 97.7 °F (36.5 °C), temperature source Oral, height 5' 2" (1.575 m), weight 67 kg (147 lb 9.6 oz).      Physical Exam   Constitutional: She appears well-developed and well-nourished. She appears distressed.   Cardiovascular: Normal rate and regular rhythm.   No carotid bruits   Pulmonary/Chest: Effort normal and breath sounds normal.   Abdominal: Soft. Bowel sounds are normal. She exhibits no distension. There is no tenderness.   Musculoskeletal:   She has thoracolumbar scoliosis.  She reports pain on right side bend.  When she does right side bend her lower right ribs contact her pelvic rim.  Lesser pain on left side been.  She has no problems with 90° of lumbar flexion or lumbar extension.       Assessment: "       1. Lumbar pain    2. Other idiopathic scoliosis, thoracolumbar region    3. Tortuous aorta    4. Essential hypertension        Plan:       We discussed options.  Meloxicam is a long-term medicine that is not helping.  Stop that.  Trial of Naprosyn 500 b.i.d..  Chiropractor consult to Dr. Landis.  It is okay to try over-the-counter lidocaine patches.  She has not reported any claudication relating to her arterial sclerotic aorta.

## 2019-03-01 ENCOUNTER — TELEPHONE (OUTPATIENT)
Dept: FAMILY MEDICINE | Facility: CLINIC | Age: 84
End: 2019-03-01

## 2019-03-01 NOTE — TELEPHONE ENCOUNTER
Spoke with patient and she states that she will just call back for recommendation for needle therapy; acupuncture. States that Dr Landis is not covered by insurance.

## 2019-03-01 NOTE — TELEPHONE ENCOUNTER
----- Message from Brenda Abbott sent at 3/1/2019 10:32 AM CST -----  Contact: self  Patient is requesting a call back from Nicky, the first doctor Dr Wayne referred her to, Dr Landis does not take her insurance and she want to ask about the second one on her list.  Call back to advise at [phone] ([phone]).  Thanks

## 2019-03-01 NOTE — TELEPHONE ENCOUNTER
----- Message from Hannah Lopez sent at 3/1/2019  3:00 PM CST -----  Contact: Patient  Type:  Patient Returning Call    Who Called:  Patient  Who Left Message for Patient:  Antonia  Does the patient know what this is regarding?:  accupuncture  Best Call Back Number: [phone]

## 2019-03-21 RX ORDER — LORAZEPAM 1 MG/1
TABLET ORAL
Qty: 90 TABLET | Refills: 1 | Status: SHIPPED | OUTPATIENT
Start: 2019-03-21 | End: 2019-10-31 | Stop reason: SDUPTHER

## 2019-04-24 RX ORDER — PROPRANOLOL HYDROCHLORIDE 20 MG/1
TABLET ORAL
Qty: 180 TABLET | Refills: 1 | Status: SHIPPED | OUTPATIENT
Start: 2019-04-24 | End: 2019-09-05 | Stop reason: SDUPTHER

## 2019-05-16 ENCOUNTER — OFFICE VISIT (OUTPATIENT)
Dept: FAMILY MEDICINE | Facility: CLINIC | Age: 84
End: 2019-05-16
Payer: MEDICARE

## 2019-05-16 ENCOUNTER — LAB VISIT (OUTPATIENT)
Dept: LAB | Facility: HOSPITAL | Age: 84
End: 2019-05-16
Attending: FAMILY MEDICINE
Payer: MEDICARE

## 2019-05-16 VITALS
SYSTOLIC BLOOD PRESSURE: 148 MMHG | HEIGHT: 62 IN | HEART RATE: 68 BPM | BODY MASS INDEX: 27.04 KG/M2 | WEIGHT: 146.94 LBS | DIASTOLIC BLOOD PRESSURE: 80 MMHG

## 2019-05-16 DIAGNOSIS — R73.01 IMPAIRED FASTING GLUCOSE: ICD-10-CM

## 2019-05-16 DIAGNOSIS — M15.9 PRIMARY OSTEOARTHRITIS INVOLVING MULTIPLE JOINTS: ICD-10-CM

## 2019-05-16 DIAGNOSIS — I10 ESSENTIAL HYPERTENSION: ICD-10-CM

## 2019-05-16 DIAGNOSIS — E03.4 HYPOTHYROIDISM DUE TO ACQUIRED ATROPHY OF THYROID: ICD-10-CM

## 2019-05-16 DIAGNOSIS — D53.9 NUTRITIONAL ANEMIA: ICD-10-CM

## 2019-05-16 DIAGNOSIS — G47.01 INSOMNIA DUE TO MEDICAL CONDITION: ICD-10-CM

## 2019-05-16 DIAGNOSIS — N39.3 STRESS INCONTINENCE: ICD-10-CM

## 2019-05-16 DIAGNOSIS — Z00.00 HEALTH MAINTENANCE EXAMINATION: Primary | ICD-10-CM

## 2019-05-16 LAB
ALBUMIN SERPL BCP-MCNC: 4.4 G/DL (ref 3.5–5.2)
ALP SERPL-CCNC: 89 U/L (ref 55–135)
ALT SERPL W/O P-5'-P-CCNC: 16 U/L (ref 10–44)
ANION GAP SERPL CALC-SCNC: 9 MMOL/L (ref 8–16)
AST SERPL-CCNC: 18 U/L (ref 10–40)
BILIRUB SERPL-MCNC: 0.3 MG/DL (ref 0.1–1)
BUN SERPL-MCNC: 22 MG/DL (ref 8–23)
CALCIUM SERPL-MCNC: 10.1 MG/DL (ref 8.7–10.5)
CHLORIDE SERPL-SCNC: 101 MMOL/L (ref 95–110)
CHOLEST SERPL-MCNC: 183 MG/DL (ref 120–199)
CHOLEST/HDLC SERPL: 3.7 {RATIO} (ref 2–5)
CO2 SERPL-SCNC: 28 MMOL/L (ref 23–29)
CREAT SERPL-MCNC: 0.8 MG/DL (ref 0.5–1.4)
ERYTHROCYTE [DISTWIDTH] IN BLOOD BY AUTOMATED COUNT: 13.9 % (ref 11.5–14.5)
EST. GFR  (AFRICAN AMERICAN): >60 ML/MIN/1.73 M^2
EST. GFR  (NON AFRICAN AMERICAN): >60 ML/MIN/1.73 M^2
ESTIMATED AVG GLUCOSE: 108 MG/DL (ref 68–131)
GLUCOSE SERPL-MCNC: 89 MG/DL (ref 70–110)
HBA1C MFR BLD HPLC: 5.4 % (ref 4–5.6)
HCT VFR BLD AUTO: 35.8 % (ref 37–48.5)
HDLC SERPL-MCNC: 49 MG/DL (ref 40–75)
HDLC SERPL: 26.8 % (ref 20–50)
HGB BLD-MCNC: 11.4 G/DL (ref 12–16)
LDLC SERPL CALC-MCNC: 110.2 MG/DL (ref 63–159)
MCH RBC QN AUTO: 29.8 PG (ref 27–31)
MCHC RBC AUTO-ENTMCNC: 31.8 G/DL (ref 32–36)
MCV RBC AUTO: 94 FL (ref 82–98)
NONHDLC SERPL-MCNC: 134 MG/DL
PLATELET # BLD AUTO: 357 K/UL (ref 150–350)
PMV BLD AUTO: 10.9 FL (ref 9.2–12.9)
POTASSIUM SERPL-SCNC: 4.2 MMOL/L (ref 3.5–5.1)
PROT SERPL-MCNC: 7.6 G/DL (ref 6–8.4)
RBC # BLD AUTO: 3.82 M/UL (ref 4–5.4)
SODIUM SERPL-SCNC: 138 MMOL/L (ref 136–145)
TRIGL SERPL-MCNC: 119 MG/DL (ref 30–150)
TSH SERPL DL<=0.005 MIU/L-ACNC: 3.17 UIU/ML (ref 0.4–4)
VIT B12 SERPL-MCNC: >2000 PG/ML (ref 210–950)
WBC # BLD AUTO: 10.88 K/UL (ref 3.9–12.7)

## 2019-05-16 PROCEDURE — 99397 PER PM REEVAL EST PAT 65+ YR: CPT | Mod: HCNC,S$GLB,, | Performed by: FAMILY MEDICINE

## 2019-05-16 PROCEDURE — 3079F PR MOST RECENT DIASTOLIC BLOOD PRESSURE 80-89 MM HG: ICD-10-PCS | Mod: HCNC,CPTII,S$GLB, | Performed by: FAMILY MEDICINE

## 2019-05-16 PROCEDURE — 82607 VITAMIN B-12: CPT | Mod: HCNC

## 2019-05-16 PROCEDURE — 80053 COMPREHEN METABOLIC PANEL: CPT | Mod: HCNC

## 2019-05-16 PROCEDURE — 99999 PR PBB SHADOW E&M-EST. PATIENT-LVL III: CPT | Mod: PBBFAC,HCNC,, | Performed by: FAMILY MEDICINE

## 2019-05-16 PROCEDURE — 99397 PR PREVENTIVE VISIT,EST,65 & OVER: ICD-10-PCS | Mod: HCNC,S$GLB,, | Performed by: FAMILY MEDICINE

## 2019-05-16 PROCEDURE — 99499 UNLISTED E&M SERVICE: CPT | Mod: HCNC,S$GLB,, | Performed by: FAMILY MEDICINE

## 2019-05-16 PROCEDURE — 3079F DIAST BP 80-89 MM HG: CPT | Mod: HCNC,CPTII,S$GLB, | Performed by: FAMILY MEDICINE

## 2019-05-16 PROCEDURE — 80061 LIPID PANEL: CPT | Mod: HCNC

## 2019-05-16 PROCEDURE — 85027 COMPLETE CBC AUTOMATED: CPT | Mod: HCNC

## 2019-05-16 PROCEDURE — 3077F SYST BP >= 140 MM HG: CPT | Mod: HCNC,CPTII,S$GLB, | Performed by: FAMILY MEDICINE

## 2019-05-16 PROCEDURE — 99499 RISK ADDL DX/OHS AUDIT: ICD-10-PCS | Mod: HCNC,S$GLB,, | Performed by: FAMILY MEDICINE

## 2019-05-16 PROCEDURE — 36415 COLL VENOUS BLD VENIPUNCTURE: CPT | Mod: HCNC,PN

## 2019-05-16 PROCEDURE — 99999 PR PBB SHADOW E&M-EST. PATIENT-LVL III: ICD-10-PCS | Mod: PBBFAC,HCNC,, | Performed by: FAMILY MEDICINE

## 2019-05-16 PROCEDURE — 84443 ASSAY THYROID STIM HORMONE: CPT | Mod: HCNC

## 2019-05-16 PROCEDURE — 83036 HEMOGLOBIN GLYCOSYLATED A1C: CPT | Mod: HCNC

## 2019-05-16 PROCEDURE — 3077F PR MOST RECENT SYSTOLIC BLOOD PRESSURE >= 140 MM HG: ICD-10-PCS | Mod: HCNC,CPTII,S$GLB, | Performed by: FAMILY MEDICINE

## 2019-05-16 RX ORDER — VENLAFAXINE HYDROCHLORIDE 75 MG/1
75 CAPSULE, EXTENDED RELEASE ORAL DAILY
Qty: 90 CAPSULE | Refills: 1 | Status: SHIPPED | OUTPATIENT
Start: 2019-05-16 | End: 2019-10-02 | Stop reason: SDUPTHER

## 2019-05-16 RX ORDER — TOLTERODINE 2 MG/1
2 CAPSULE, EXTENDED RELEASE ORAL DAILY
Qty: 90 CAPSULE | Refills: 3 | Status: SHIPPED | OUTPATIENT
Start: 2019-05-16 | End: 2019-07-25

## 2019-05-16 RX ORDER — NAPROXEN 500 MG/1
500 TABLET ORAL 2 TIMES DAILY PRN
COMMUNITY
End: 2019-11-20 | Stop reason: SDUPTHER

## 2019-05-16 NOTE — PROGRESS NOTES
"Subjective:       Patient ID: Bhakti Phipps is a 84 y.o. female.    Chief Complaint: Annual Exam (Annual check up. Poss due for lab.) and Fatigue    Annual exam. Feels down, depressed, no motivation for the past 4 months. HTN.  Does not check her BP. Stopped HCT 8 months ago as she though it made her incontinence worse but not sure that it made a difference. Ditropan 5 bid causing dry mouth and not too helpful.  Hypothyroid. No palpitations. Back pain persists. See last note. Also hand arthritis. She has scoliosis and some arthritic changes on xray.     Review of Systems   Constitutional: Positive for fatigue. Negative for appetite change, fever and unexpected weight change.   Respiratory: Negative for cough and shortness of breath.    Cardiovascular: Negative for chest pain, palpitations and leg swelling.   Musculoskeletal: Positive for arthralgias and back pain (Unsure if naprosyn helps so she will try a few nights without it. ).   Psychiatric/Behavioral: Positive for dysphoric mood and sleep disturbance (takes temazepam at night. ).       Objective:     Blood pressure (!) 148/80, pulse 68, height 5' 2" (1.575 m), weight 66.7 kg (146 lb 15 oz).      Physical Exam   Constitutional: She is oriented to person, place, and time. She appears well-developed and well-nourished. No distress.   HENT:   Right Ear: External ear normal.   Left Ear: External ear normal.   Nose: Nose normal.   Mouth/Throat: Oropharynx is clear and moist. No oropharyngeal exudate.   Eyes: Pupils are equal, round, and reactive to light. Conjunctivae and EOM are normal. No scleral icterus.   Neck: Normal range of motion. No thyromegaly present.   Status post radical neck resections.    Cardiovascular: Normal rate, regular rhythm, normal heart sounds and intact distal pulses.   No murmur heard.  Pulmonary/Chest: Effort normal. No respiratory distress. She has no wheezes. She has no rales. She exhibits no tenderness.   Abdominal: Soft. She " exhibits no distension and no mass. There is no tenderness.   Musculoskeletal: She exhibits no edema.   Enlarged 2nd and 3rd MCPs and some heberden's nodes.    Lymphadenopathy:     She has no cervical adenopathy.   Neurological: She is alert and oriented to person, place, and time.   Normal gait   Skin: No rash noted.   Psychiatric: She has a normal mood and affect.       Assessment:       1. Health maintenance examination    2. Essential hypertension    3. Hypothyroidism due to acquired atrophy of thyroid    4. Impaired fasting glucose    5. Nutritional anemia    6. Stress incontinence    7. Insomnia due to medical condition    8. Primary osteoarthritis involving multiple joints        Plan:       Lab today. Change ditropan to detrol. Consider urology.    increase effexor to 75 mg. Resume HCT 12.5 and record BP and rtc 2 months. Discussed shingles vaccine but she had a mild case in October 2018. Records reviewed.

## 2019-05-16 NOTE — PATIENT INSTRUCTIONS
Restart HCT 12.5 mg in am. Check BP weekly and then see me in late July.   Wait 2-3 years for Shingrix  Stop ditropan. Try detrol and see if it helps your bladder control

## 2019-06-24 RX ORDER — HYDROCHLOROTHIAZIDE 12.5 MG/1
TABLET ORAL
Qty: 90 TABLET | Refills: 3 | Status: SHIPPED | OUTPATIENT
Start: 2019-06-24 | End: 2020-03-28

## 2019-07-01 RX ORDER — TEMAZEPAM 30 MG/1
CAPSULE ORAL
Qty: 30 CAPSULE | Refills: 5 | Status: SHIPPED | OUTPATIENT
Start: 2019-07-01 | End: 2019-07-25 | Stop reason: SDUPTHER

## 2019-07-25 ENCOUNTER — OFFICE VISIT (OUTPATIENT)
Dept: FAMILY MEDICINE | Facility: CLINIC | Age: 84
End: 2019-07-25
Payer: MEDICARE

## 2019-07-25 VITALS
BODY MASS INDEX: 27.43 KG/M2 | TEMPERATURE: 98 F | WEIGHT: 149.06 LBS | HEART RATE: 70 BPM | HEIGHT: 62 IN | DIASTOLIC BLOOD PRESSURE: 62 MMHG | SYSTOLIC BLOOD PRESSURE: 134 MMHG

## 2019-07-25 DIAGNOSIS — N39.3 STRESS INCONTINENCE: Primary | ICD-10-CM

## 2019-07-25 DIAGNOSIS — G47.01 INSOMNIA DUE TO MEDICAL CONDITION: ICD-10-CM

## 2019-07-25 DIAGNOSIS — I10 ESSENTIAL HYPERTENSION: ICD-10-CM

## 2019-07-25 PROCEDURE — 99999 PR PBB SHADOW E&M-EST. PATIENT-LVL IV: CPT | Mod: PBBFAC,HCNC,, | Performed by: FAMILY MEDICINE

## 2019-07-25 PROCEDURE — 1101F PR PT FALLS ASSESS DOC 0-1 FALLS W/OUT INJ PAST YR: ICD-10-PCS | Mod: HCNC,CPTII,S$GLB, | Performed by: FAMILY MEDICINE

## 2019-07-25 PROCEDURE — 99214 OFFICE O/P EST MOD 30 MIN: CPT | Mod: HCNC,S$GLB,, | Performed by: FAMILY MEDICINE

## 2019-07-25 PROCEDURE — 3078F DIAST BP <80 MM HG: CPT | Mod: HCNC,CPTII,S$GLB, | Performed by: FAMILY MEDICINE

## 2019-07-25 PROCEDURE — 1101F PT FALLS ASSESS-DOCD LE1/YR: CPT | Mod: HCNC,CPTII,S$GLB, | Performed by: FAMILY MEDICINE

## 2019-07-25 PROCEDURE — 3075F SYST BP GE 130 - 139MM HG: CPT | Mod: HCNC,CPTII,S$GLB, | Performed by: FAMILY MEDICINE

## 2019-07-25 PROCEDURE — 3078F PR MOST RECENT DIASTOLIC BLOOD PRESSURE < 80 MM HG: ICD-10-PCS | Mod: HCNC,CPTII,S$GLB, | Performed by: FAMILY MEDICINE

## 2019-07-25 PROCEDURE — 99999 PR PBB SHADOW E&M-EST. PATIENT-LVL IV: ICD-10-PCS | Mod: PBBFAC,HCNC,, | Performed by: FAMILY MEDICINE

## 2019-07-25 PROCEDURE — 3075F PR MOST RECENT SYSTOLIC BLOOD PRESS GE 130-139MM HG: ICD-10-PCS | Mod: HCNC,CPTII,S$GLB, | Performed by: FAMILY MEDICINE

## 2019-07-25 PROCEDURE — 99214 PR OFFICE/OUTPT VISIT, EST, LEVL IV, 30-39 MIN: ICD-10-PCS | Mod: HCNC,S$GLB,, | Performed by: FAMILY MEDICINE

## 2019-07-25 NOTE — PROGRESS NOTES
"Subjective:       Patient ID: Bhakti Phipps is a 85 y.o. female.    Chief Complaint: Follow-up (BP f/u. Pt has not been taking the Detrol. Please discuss and edit med list if need be.)    Follow-up hypertension and urinary incontinence.  She has resume taking hydrochlorothiazide 12.5 mg.  She had been hesitant to take it because she felt like it caused more urinary incontinence.  It sounds like she has mostly stress incontinence.  I had changed her from [place] infante to Detrol which did have fewer side effects for her.  She does not notice that much difference between taking Detrol and not taking it.  She still has some leakage and has to wear a pad.  She is interested in pursuing possible treatment for stress incontinence.  She complains of being sleepy during the day.  She takes Effexor in the morning.  At night she takes Ativan cyst as needed.  She has been taking temazepam 30 mg at night to help her sleep.  She has tried Ambien in the past with minimal benefit.  She was interested in a lower dose of temazepam.  She has had some sort of insomnia for at least 5 years.  She only drinks 1 cup of coffee in the morning.    Review of Systems   Constitutional: Positive for fatigue. Negative for fever and unexpected weight change.   Respiratory: Negative for cough and shortness of breath.    Cardiovascular: Negative for chest pain, palpitations and leg swelling.   Psychiatric/Behavioral: Positive for sleep disturbance. The patient is nervous/anxious (Her anxiety is variable.  She does get some benefit from Ativan.).        Objective:     Blood pressure 134/62, pulse 70, temperature 97.6 °F (36.4 °C), temperature source Oral, height 5' 2" (1.575 m), weight 67.6 kg (149 lb 0.5 oz).      Physical Exam   Constitutional: She appears well-developed and well-nourished. No distress.   Cardiovascular: Normal rate and regular rhythm.   Pulmonary/Chest: Effort normal and breath sounds normal.   Neurological: She is alert.     "   Assessment:       1. Stress incontinence    2. Essential hypertension    3. Insomnia due to medical condition        Plan:       It looks like temazepam 22 mg would be an expensive brand name medication for her.  She can try to empty out some of the contents of temazepam 30 mg.  Move Effexor to the evening.  Physical therapy consult for stress incontinence.  Follow up with me in 3 months.  Written prescription for shingles vaccine.

## 2019-07-30 RX ORDER — LEVOTHYROXINE SODIUM 125 UG/1
TABLET ORAL
Qty: 90 TABLET | Refills: 3 | Status: SHIPPED | OUTPATIENT
Start: 2019-07-30 | End: 2020-04-25

## 2019-08-13 ENCOUNTER — OFFICE VISIT (OUTPATIENT)
Dept: FAMILY MEDICINE | Facility: CLINIC | Age: 84
End: 2019-08-13
Payer: MEDICARE

## 2019-08-13 VITALS
DIASTOLIC BLOOD PRESSURE: 70 MMHG | HEART RATE: 66 BPM | OXYGEN SATURATION: 95 % | HEIGHT: 62 IN | WEIGHT: 150.13 LBS | SYSTOLIC BLOOD PRESSURE: 132 MMHG | BODY MASS INDEX: 27.63 KG/M2

## 2019-08-13 DIAGNOSIS — E78.5 HYPERLIPIDEMIA, UNSPECIFIED HYPERLIPIDEMIA TYPE: ICD-10-CM

## 2019-08-13 DIAGNOSIS — I77.1 TORTUOUS AORTA: ICD-10-CM

## 2019-08-13 DIAGNOSIS — Z78.0 POSTMENOPAUSAL: ICD-10-CM

## 2019-08-13 DIAGNOSIS — I10 ESSENTIAL HYPERTENSION: ICD-10-CM

## 2019-08-13 DIAGNOSIS — Z00.00 ENCOUNTER FOR PREVENTIVE HEALTH EXAMINATION: Primary | ICD-10-CM

## 2019-08-13 PROCEDURE — G0439 PPPS, SUBSEQ VISIT: HCPCS | Mod: HCNC,S$GLB,, | Performed by: NURSE PRACTITIONER

## 2019-08-13 PROCEDURE — 3075F PR MOST RECENT SYSTOLIC BLOOD PRESS GE 130-139MM HG: ICD-10-PCS | Mod: HCNC,CPTII,S$GLB, | Performed by: NURSE PRACTITIONER

## 2019-08-13 PROCEDURE — 99999 PR PBB SHADOW E&M-EST. PATIENT-LVL V: CPT | Mod: PBBFAC,HCNC,, | Performed by: NURSE PRACTITIONER

## 2019-08-13 PROCEDURE — 3078F DIAST BP <80 MM HG: CPT | Mod: HCNC,CPTII,S$GLB, | Performed by: NURSE PRACTITIONER

## 2019-08-13 PROCEDURE — 99999 PR PBB SHADOW E&M-EST. PATIENT-LVL V: ICD-10-PCS | Mod: PBBFAC,HCNC,, | Performed by: NURSE PRACTITIONER

## 2019-08-13 PROCEDURE — G0439 PR MEDICARE ANNUAL WELLNESS SUBSEQUENT VISIT: ICD-10-PCS | Mod: HCNC,S$GLB,, | Performed by: NURSE PRACTITIONER

## 2019-08-13 PROCEDURE — 3078F PR MOST RECENT DIASTOLIC BLOOD PRESSURE < 80 MM HG: ICD-10-PCS | Mod: HCNC,CPTII,S$GLB, | Performed by: NURSE PRACTITIONER

## 2019-08-13 PROCEDURE — 3075F SYST BP GE 130 - 139MM HG: CPT | Mod: HCNC,CPTII,S$GLB, | Performed by: NURSE PRACTITIONER

## 2019-08-13 NOTE — PATIENT INSTRUCTIONS
Counseling and Referral of Other Preventative  (Italic type indicates deductible and co-insurance are waived)    Patient Name: Bhakti Phipps  Today's Date: 8/13/2019    Health Maintenance       Date Due Completion Date    Shingles Vaccine (2 of 3) 09/24/2007 7/30/2007    Influenza Vaccine (1) 08/01/2019 10/8/2018    DEXA SCAN 08/17/2019 8/17/2016    Lipid Panel 05/16/2020 5/16/2019        No orders of the defined types were placed in this encounter.    The following information is provided to all patients.  This information is to help you find resources for any of the problems found today that may be affecting your health:                Living healthy guide: www.[facility].[place].gov      Understanding Diabetes: www.diabetes.org      Eating healthy: www.cdc.gov/healthyweight      CDC home safety checklist: www.cdc.gov/steadi/patient.html      Agency on Aging: www.goea.[place].[facility]      Alcoholics anonymous (AA): www.aa.org      Physical Activity: www.tiffany.nih.gov/yf2uzrv      Tobacco use: www.quitwithusla.org

## 2019-08-13 NOTE — PROGRESS NOTES
"Bhakti Phipps presented for a  Medicare AWV and comprehensive Health Risk Assessment today. The following components were reviewed and updated:    · Medical history  · Family History  · Social history  · Allergies and Current Medications  · Health Risk Assessment  · Health Maintenance  · Care Team     ** See Completed Assessments for Annual Wellness Visit within the encounter summary.**       The following assessments were completed:  · Living Situation  · CAGE  · Depression Screening  · Timed Get Up and Go  · Whisper Test  · Cognitive Function Screening          · Nutrition Screening  · ADL Screening  · PAQ Screening    Vitals:    08/13/19 1258   BP: 132/70   BP Location: Left arm   Patient Position: Sitting   BP Method: Medium (Manual)   Pulse: 66   SpO2: 95%   Weight: 68.1 kg (150 lb 2.1 oz)   Height: 5' 2" (1.575 m)     Body mass index is 27.46 kg/m².  Physical Exam   Constitutional: She appears well-developed and well-nourished. No distress.   HENT:   Head: Normocephalic.   Cardiovascular: Normal rate, regular rhythm and normal heart sounds.   No murmur heard.  Pulmonary/Chest: Effort normal and breath sounds normal. No respiratory distress. She has no wheezes.   Neurological: She is alert.   Psychiatric: She has a normal mood and affect.         Diagnoses and health risks identified today and associated recommendations/orders:    1. Encounter for preventive health examination  Reviewed health maintenance and provided recommendations   Recommend shingrix vaccine     2. Postmenopausal    - DXA Bone Density Spine And Hip; Future    3. Essential hypertension  Continue to monitor  Followed by García Jauregui MD .      4. Tortuous aorta  Continue to monitor  BP controlled  Followed by García Jauregui MD .      5. Hyperlipidemia, unspecified hyperlipidemia type  Continue to monitor  Followed by García Jauregui MD .        Provided Bhakti with a 5-10 year written screening schedule and personal prevention plan. " Recommendations were developed using the USPSTF age appropriate recommendations. Education, counseling, and referrals were provided as needed. After Visit Summary printed and given to patient which includes a list of additional screenings\tests needed.    Follow up in about 1 year (around 8/13/2020).    Natasha Amezquita NP  I offered to discuss end of life issues, including information on how to make advance directives that the patient could use to name someone who would make medical decisions on their behalf if they became too ill to make themselves.    ___Patient declined  _X_Patient is interested, I provided paper work and offered to discuss.

## 2019-08-22 ENCOUNTER — HOSPITAL ENCOUNTER (OUTPATIENT)
Dept: RADIOLOGY | Facility: HOSPITAL | Age: 84
Discharge: HOME | End: 2019-08-22
Attending: NURSE PRACTITIONER
Payer: MEDICARE

## 2019-08-22 DIAGNOSIS — Z78.0 POSTMENOPAUSAL: ICD-10-CM

## 2019-08-22 PROCEDURE — 77080 DXA BONE DENSITY AXIAL: CPT | Mod: TC,HCNC,PO

## 2019-08-22 PROCEDURE — 77080 DXA BONE DENSITY AXIAL: CPT | Mod: 26,HCNC,, | Performed by: RADIOLOGY

## 2019-08-22 PROCEDURE — 77080 DEXA BONE DENSITY SPINE HIP: ICD-10-PCS | Mod: 26,HCNC,, | Performed by: RADIOLOGY

## 2019-08-23 ENCOUNTER — OFFICE VISIT (OUTPATIENT)
Dept: FAMILY MEDICINE | Facility: CLINIC | Age: 84
End: 2019-08-23
Payer: MEDICARE

## 2019-08-23 VITALS
RESPIRATION RATE: 18 BRPM | BODY MASS INDEX: 27.73 KG/M2 | HEIGHT: 62 IN | SYSTOLIC BLOOD PRESSURE: 138 MMHG | DIASTOLIC BLOOD PRESSURE: 76 MMHG | HEART RATE: 72 BPM | WEIGHT: 150.69 LBS

## 2019-08-23 DIAGNOSIS — N90.7 LABIAL CYST: Primary | ICD-10-CM

## 2019-08-23 PROCEDURE — 1101F PR PT FALLS ASSESS DOC 0-1 FALLS W/OUT INJ PAST YR: ICD-10-PCS | Mod: HCNC,CPTII,S$GLB, | Performed by: INTERNAL MEDICINE

## 2019-08-23 PROCEDURE — 99999 PR PBB SHADOW E&M-EST. PATIENT-LVL III: CPT | Mod: PBBFAC,HCNC,, | Performed by: INTERNAL MEDICINE

## 2019-08-23 PROCEDURE — 3075F SYST BP GE 130 - 139MM HG: CPT | Mod: HCNC,CPTII,S$GLB, | Performed by: INTERNAL MEDICINE

## 2019-08-23 PROCEDURE — 3075F PR MOST RECENT SYSTOLIC BLOOD PRESS GE 130-139MM HG: ICD-10-PCS | Mod: HCNC,CPTII,S$GLB, | Performed by: INTERNAL MEDICINE

## 2019-08-23 PROCEDURE — 99213 OFFICE O/P EST LOW 20 MIN: CPT | Mod: HCNC,S$GLB,, | Performed by: INTERNAL MEDICINE

## 2019-08-23 PROCEDURE — 3078F DIAST BP <80 MM HG: CPT | Mod: HCNC,CPTII,S$GLB, | Performed by: INTERNAL MEDICINE

## 2019-08-23 PROCEDURE — 99213 PR OFFICE/OUTPT VISIT, EST, LEVL III, 20-29 MIN: ICD-10-PCS | Mod: HCNC,S$GLB,, | Performed by: INTERNAL MEDICINE

## 2019-08-23 PROCEDURE — 3078F PR MOST RECENT DIASTOLIC BLOOD PRESSURE < 80 MM HG: ICD-10-PCS | Mod: HCNC,CPTII,S$GLB, | Performed by: INTERNAL MEDICINE

## 2019-08-23 PROCEDURE — 99999 PR PBB SHADOW E&M-EST. PATIENT-LVL III: ICD-10-PCS | Mod: PBBFAC,HCNC,, | Performed by: INTERNAL MEDICINE

## 2019-08-23 PROCEDURE — 1101F PT FALLS ASSESS-DOCD LE1/YR: CPT | Mod: HCNC,CPTII,S$GLB, | Performed by: INTERNAL MEDICINE

## 2019-08-23 NOTE — PROGRESS NOTES
Assessment and Plan:    1. Labial cyst  Patient presenting with upper labial/lower mons cyst ~ 2x1 cm and not acutely inflamed, no erythema and minimally tender. She was most concerned about the lesion she was feeling being a skin cancer, I reassured her that this does not look like skin cancer or other malignancy. Reassured her this is almost certainly a benign lesion. If this becomes inflamed again, consider seeing Gyn. Discussed sitz baths.   - Ambulatory referral to Obstetrics / Gynecology      ______________________________________________________________________  Subjective:    Chief Complaint:  Lesion near vagina    HPI:  Bhakti is a 85 y.o. year old woman here for evaluation of a lesion near her vagina. She is new to me, she is a patient of Dr. Jauregui.     She reports that she has noticed this lesion for the past month. Had seen Dr. Last for this earlier this month but not able to see any lesion at that time. Reports that the lump was worse when she woke up this morning. Called Dr. Dixon office but not available. Reports that it does not usually hurt, but it was hurting this morning, pain has gone away.     She reports that Dr. Last had previously removed a lesion from near there, but she reports this lesion was not cancerous. She does have a personal history of melanoma.     Medications:  Current Outpatient Medications on File Prior to Visit   Medication Sig Dispense Refill    hydroCHLOROthiazide (HYDRODIURIL) 12.5 MG Tab TAKE 1 TABLET EVERY DAY 90 tablet 3    levothyroxine (SYNTHROID) 125 MCG tablet TAKE 1 TABLET EVERY DAY 90 tablet 3    LORazepam (ATIVAN) 1 MG tablet TAKE 1 TABLET EVERY EVENING. 90 tablet 1    magnesium oxide 500 mg Tab Take 1 tablet by mouth once daily.      naproxen (NAPROSYN) 500 MG tablet Take 500 mg by mouth 2 (two) times daily as needed.      propranolol (INDERAL) 20 MG tablet TAKE 1 TABLET TWICE DAILY 180 tablet 1    temazepam (RESTORIL) 30 mg capsule TAKE 1  "CAPSULE EVERY NIGHT AS NEEDED  FOR  INSOMNIA 90 capsule 1    venlafaxine (EFFEXOR-XR) 75 MG 24 hr capsule Take 1 capsule (75 mg total) by mouth once daily. 90 capsule 1    VIT C/E/ZN/COPPR/LUTEIN/ZEAXAN (PRESERVISION AREDS 2 ORAL) Take by mouth.      lidocaine (LIDODERM) 5 % Place 1 patch onto the skin every 24 hours. Remove & Discard patch within 12 hours or as directed by MD       No current facility-administered medications on file prior to visit.        Review of Systems:  Review of Systems   Constitutional: Negative for chills and fever.   Genitourinary: Negative for genital sores, pelvic pain and vaginal discharge.       Past Medical History:  Past Medical History:   Diagnosis Date    Anxiety     Arthritis     History of melanoma     at age of 26    HTN (hypertension) 5/21/2012    Hypertension     Hypothyroid 5/21/2012    Hypothyroidism     Insomnia 2/25/2014    Melanoma     age 26    Osteoarthritis 5/21/2012    Tremor        Objective:    Vitals:  Vitals:    08/23/19 1446   BP: 138/76   Pulse: 72   Resp: 18   Weight: 68.3 kg (150 lb 11 oz)   Height: 5' 2" (1.575 m)   PainSc: 0-No pain       Physical Exam   Constitutional: She is oriented to person, place, and time. She appears well-developed and well-nourished. No distress.   HENT:   Mouth/Throat: Oropharynx is clear and moist.   Eyes: Conjunctivae are normal. Right eye exhibits no discharge. Left eye exhibits no discharge.   Cardiovascular: Normal rate and regular rhythm.   Pulmonary/Chest: Effort normal. No respiratory distress.   Genitourinary:         Neurological: She is alert and oriented to person, place, and time.   Skin: Skin is warm and dry.   Psychiatric: She has a normal mood and affect. Her behavior is normal. Judgment and thought content normal.   Vitals reviewed.      Zoë Garcia MD  Internal Medicine  "

## 2019-08-27 ENCOUNTER — TELEPHONE (OUTPATIENT)
Dept: FAMILY MEDICINE | Facility: CLINIC | Age: 84
End: 2019-08-27

## 2019-08-27 DIAGNOSIS — Z12.39 SCREENING FOR MALIGNANT NEOPLASM OF BREAST: Primary | ICD-10-CM

## 2019-08-27 NOTE — TELEPHONE ENCOUNTER
----- Message from Chanel Hu sent at 8/27/2019 11:08 AM CDT -----  Contact: Bhakti pederson  Type: Needs Medical Advice    Who Called:  Bhakti  Best Call Back Number: [phone]  Additional Information: Pls call Bhakti regarding orders for a mammo. She will have a ride on Thursday to get one done if orders can be put in before then.

## 2019-08-29 ENCOUNTER — TELEPHONE (OUTPATIENT)
Dept: FAMILY MEDICINE | Facility: CLINIC | Age: 84
End: 2019-08-29

## 2019-08-29 NOTE — TELEPHONE ENCOUNTER
----- Message from Natasha Amezquita NP sent at 8/29/2019  1:58 PM CDT -----  No significant change in bone density.  Pt has osteopenia.  Repeat in 2-3 years.

## 2019-09-05 ENCOUNTER — TELEPHONE (OUTPATIENT)
Dept: FAMILY MEDICINE | Facility: CLINIC | Age: 84
End: 2019-09-05

## 2019-09-05 RX ORDER — PROPRANOLOL HYDROCHLORIDE 20 MG/1
TABLET ORAL
Qty: 180 TABLET | Refills: 1 | Status: SHIPPED | OUTPATIENT
Start: 2019-09-05 | End: 2020-01-27

## 2019-09-05 NOTE — TELEPHONE ENCOUNTER
----- Message from Katrin Unger sent at 9/5/2019 12:26 PM CDT -----  Type: Needs Medical Advice    Who Called:  Patient  Best Call Back Number: [phone]  Additional Information: patient is requesting to have the shingles shot, contact to advise if the clinic has this injection and to schedule to administer.     Thank you

## 2019-09-05 NOTE — TELEPHONE ENCOUNTER
Spoke w/ pt. Advised that this is not covered by Medical, she should contact the pharmacy to find out if she has to pay anything out of pocket if run through her rx plan. Pt agreed.

## 2019-09-26 ENCOUNTER — HOSPITAL ENCOUNTER (OUTPATIENT)
Dept: RADIOLOGY | Facility: HOSPITAL | Age: 84
Discharge: HOME | End: 2019-09-26
Attending: FAMILY MEDICINE
Payer: MEDICARE

## 2019-09-26 DIAGNOSIS — Z12.39 SCREENING FOR MALIGNANT NEOPLASM OF BREAST: ICD-10-CM

## 2019-09-26 PROCEDURE — 77067 SCR MAMMO BI INCL CAD: CPT | Mod: 26,HCNC,, | Performed by: RADIOLOGY

## 2019-09-26 PROCEDURE — 77067 SCR MAMMO BI INCL CAD: CPT | Mod: TC,HCNC,PO

## 2019-09-26 PROCEDURE — 77063 BREAST TOMOSYNTHESIS BI: CPT | Mod: 26,HCNC,, | Performed by: RADIOLOGY

## 2019-09-26 PROCEDURE — 77067 MAMMO DIGITAL SCREENING BILAT WITH TOMOSYNTHESIS_CAD: ICD-10-PCS | Mod: 26,HCNC,, | Performed by: RADIOLOGY

## 2019-09-26 PROCEDURE — 77063 MAMMO DIGITAL SCREENING BILAT WITH TOMOSYNTHESIS_CAD: ICD-10-PCS | Mod: 26,HCNC,, | Performed by: RADIOLOGY

## 2019-10-03 RX ORDER — VENLAFAXINE HYDROCHLORIDE 75 MG/1
75 CAPSULE, EXTENDED RELEASE ORAL DAILY
Qty: 90 CAPSULE | Refills: 1 | Status: SHIPPED | OUTPATIENT
Start: 2019-10-03 | End: 2020-02-21

## 2019-10-11 RX ORDER — NAPROXEN 500 MG/1
TABLET ORAL
Qty: 30 TABLET | Refills: 2 | Status: SHIPPED | OUTPATIENT
Start: 2019-10-11 | End: 2021-11-02 | Stop reason: SDUPTHER

## 2019-11-01 RX ORDER — LORAZEPAM 1 MG/1
TABLET ORAL
Qty: 30 TABLET | Refills: 5 | Status: SHIPPED | OUTPATIENT
Start: 2019-11-01 | End: 2020-06-18 | Stop reason: SDUPTHER

## 2019-11-13 NOTE — TELEPHONE ENCOUNTER
I sent her a result letter on 8-29. You can read it to her.    SUZANNE MÁRQUEZ ; 11/21/2019 ; [facility] [id number]  SUZANNE MÁRQUEZ ; 12/19/2019 ; [facility]ariat[facility] [id number]

## 2019-11-20 ENCOUNTER — HOSPITAL ENCOUNTER (OUTPATIENT)
Dept: RADIOLOGY | Facility: HOSPITAL | Age: 84
Discharge: HOME | End: 2019-11-20
Attending: FAMILY MEDICINE
Payer: MEDICARE

## 2019-11-20 ENCOUNTER — OFFICE VISIT (OUTPATIENT)
Dept: FAMILY MEDICINE | Facility: CLINIC | Age: 84
End: 2019-11-20
Payer: MEDICARE

## 2019-11-20 ENCOUNTER — TELEPHONE (OUTPATIENT)
Dept: ORTHOPEDICS | Facility: CLINIC | Age: 84
End: 2019-11-20

## 2019-11-20 VITALS
HEART RATE: 62 BPM | WEIGHT: 153.56 LBS | BODY MASS INDEX: 28.26 KG/M2 | RESPIRATION RATE: 18 BRPM | DIASTOLIC BLOOD PRESSURE: 60 MMHG | SYSTOLIC BLOOD PRESSURE: 120 MMHG | TEMPERATURE: 98 F | OXYGEN SATURATION: 98 % | HEIGHT: 62 IN

## 2019-11-20 DIAGNOSIS — S63.502S LEFT WRIST SPRAIN, SEQUELA: ICD-10-CM

## 2019-11-20 DIAGNOSIS — N39.3 STRESS INCONTINENCE: ICD-10-CM

## 2019-11-20 DIAGNOSIS — S63.502S LEFT WRIST SPRAIN, SEQUELA: Primary | ICD-10-CM

## 2019-11-20 PROCEDURE — 73110 XR WRIST COMPLETE 3 VIEWS LEFT: ICD-10-PCS | Mod: 26,HCNC,LT, | Performed by: RADIOLOGY

## 2019-11-20 PROCEDURE — 1159F PR MEDICATION LIST DOCUMENTED IN MEDICAL RECORD: ICD-10-PCS | Mod: HCNC,S$GLB,, | Performed by: FAMILY MEDICINE

## 2019-11-20 PROCEDURE — 1101F PT FALLS ASSESS-DOCD LE1/YR: CPT | Mod: HCNC,CPTII,S$GLB, | Performed by: FAMILY MEDICINE

## 2019-11-20 PROCEDURE — 1125F AMNT PAIN NOTED PAIN PRSNT: CPT | Mod: HCNC,S$GLB,, | Performed by: FAMILY MEDICINE

## 2019-11-20 PROCEDURE — 99999 PR PBB SHADOW E&M-EST. PATIENT-LVL V: ICD-10-PCS | Mod: PBBFAC,HCNC,, | Performed by: FAMILY MEDICINE

## 2019-11-20 PROCEDURE — 3078F DIAST BP <80 MM HG: CPT | Mod: HCNC,CPTII,S$GLB, | Performed by: FAMILY MEDICINE

## 2019-11-20 PROCEDURE — 73110 X-RAY EXAM OF WRIST: CPT | Mod: 26,HCNC,LT, | Performed by: RADIOLOGY

## 2019-11-20 PROCEDURE — 99214 PR OFFICE/OUTPT VISIT, EST, LEVL IV, 30-39 MIN: ICD-10-PCS | Mod: HCNC,S$GLB,, | Performed by: FAMILY MEDICINE

## 2019-11-20 PROCEDURE — 3074F SYST BP LT 130 MM HG: CPT | Mod: HCNC,CPTII,S$GLB, | Performed by: FAMILY MEDICINE

## 2019-11-20 PROCEDURE — 73110 X-RAY EXAM OF WRIST: CPT | Mod: TC,HCNC,PN,LT

## 2019-11-20 PROCEDURE — 1101F PR PT FALLS ASSESS DOC 0-1 FALLS W/OUT INJ PAST YR: ICD-10-PCS | Mod: HCNC,CPTII,S$GLB, | Performed by: FAMILY MEDICINE

## 2019-11-20 PROCEDURE — 3078F PR MOST RECENT DIASTOLIC BLOOD PRESSURE < 80 MM HG: ICD-10-PCS | Mod: HCNC,CPTII,S$GLB, | Performed by: FAMILY MEDICINE

## 2019-11-20 PROCEDURE — 1125F PR PAIN SEVERITY QUANTIFIED, PAIN PRESENT: ICD-10-PCS | Mod: HCNC,S$GLB,, | Performed by: FAMILY MEDICINE

## 2019-11-20 PROCEDURE — 1159F MED LIST DOCD IN RCRD: CPT | Mod: HCNC,S$GLB,, | Performed by: FAMILY MEDICINE

## 2019-11-20 PROCEDURE — 3074F PR MOST RECENT SYSTOLIC BLOOD PRESSURE < 130 MM HG: ICD-10-PCS | Mod: HCNC,CPTII,S$GLB, | Performed by: FAMILY MEDICINE

## 2019-11-20 PROCEDURE — 99214 OFFICE O/P EST MOD 30 MIN: CPT | Mod: HCNC,S$GLB,, | Performed by: FAMILY MEDICINE

## 2019-11-20 PROCEDURE — 99999 PR PBB SHADOW E&M-EST. PATIENT-LVL V: CPT | Mod: PBBFAC,HCNC,, | Performed by: FAMILY MEDICINE

## 2019-11-20 NOTE — PROGRESS NOTES
"Subjective:       Patient ID: Bhakti Phipps is a 85 y.o. female.    Chief Complaint: Wrist Pain (hyperextended Lt. wrist x 1 month ago when she caught herself in a fall; went to [facility] the next day and had xray that was normal )      She tripped a month ago and caught herself but hyper extended her left wrist.  She was seen in urgent care and had a negative x-ray.  She has persistent pain and swelling.  It is difficult for her to twist a door knob.  Hypertension has been well controlled.  She also has hypothyroidism.    Review of Systems   Constitutional: Negative for fever and unexpected weight change.   Respiratory: Negative for shortness of breath.    Cardiovascular: Negative for chest pain and leg swelling.   Genitourinary:        She is having a lot of trouble with stress incontinence.  She has to change a pad frequently.  She has previously taken Detrol and other medication.  I had referred her to physical therapy in the summer but there were some family issues and she was not able to schedule that.   Musculoskeletal: Positive for arthralgias.       Objective:     Blood pressure 120/60, pulse 62, temperature 97.7 °F (36.5 °C), temperature source Oral, resp. rate 18, height 5' 2" (1.575 m), weight 69.7 kg (153 lb 8.8 oz), SpO2 98 %.      Physical Exam   Constitutional: She appears well-developed and well-nourished. No distress.   Cardiovascular: Normal rate and regular rhythm.   Pulmonary/Chest: Effort normal.   Musculoskeletal:   Left wrist seems a bit swollen.  She is tender on the dorsal and volar aspect of the ulnar side of the wrist.  No deformity.  She does have pain with full extension and flexion.  No snuffbox tenderness.   Neurological: She is alert.       Assessment:       1. Left wrist sprain, sequela    2. Stress incontinence        Plan:         Referral to  Dr. Whitney.  I reviewed her wrist x-ray and the report.  There were some arthritic changes at the [facility] but otherwise no sign of " fracture or dislocation.  Urology consult for further evaluation of her incontinence.

## 2019-11-26 NOTE — TELEPHONE ENCOUNTER
----- Message from Matt Blankenship sent at 11/26/2019  2:15 PM CST -----  Contact: same  Patient called in and stated she needs a refill on her Rx for temazepam (RESTORIL) 30 mg capsules, 90 capsules with 1 Refill last filled on 12/18/2018   TAKE 1 CAPSULE EVERY NIGHT AS NEEDED  FOR  INSOMNIA    [address] [facility] - [address]  [address]  [place] [address]  Phone: [phone] Fax: [phone]    Patient call back number is [phone]

## 2019-11-27 RX ORDER — TEMAZEPAM 30 MG/1
CAPSULE ORAL
Qty: 90 CAPSULE | Refills: 1 | Status: SHIPPED | OUTPATIENT
Start: 2019-11-27 | End: 2020-05-25

## 2020-01-27 RX ORDER — PROPRANOLOL HYDROCHLORIDE 20 MG/1
TABLET ORAL
Qty: 180 TABLET | Refills: 1 | Status: SHIPPED | OUTPATIENT
Start: 2020-01-27 | End: 2020-05-11

## 2020-02-21 RX ORDER — VENLAFAXINE HYDROCHLORIDE 75 MG/1
CAPSULE, EXTENDED RELEASE ORAL
Qty: 90 CAPSULE | Refills: 2 | Status: SHIPPED | OUTPATIENT
Start: 2020-02-21 | End: 2020-09-10

## 2020-02-21 NOTE — TELEPHONE ENCOUNTER
Called patient in regards to rx refill notification. No answer. Left VM notification to let her know her medications were refilled and sent over to her preferred pharmacy of choice.

## 2020-03-21 RX ORDER — TOLTERODINE 2 MG/1
CAPSULE, EXTENDED RELEASE ORAL
Qty: 90 CAPSULE | Refills: 3 | Status: SHIPPED | OUTPATIENT
Start: 2020-03-21 | End: 2020-07-15

## 2020-03-28 RX ORDER — HYDROCHLOROTHIAZIDE 12.5 MG/1
TABLET ORAL
Qty: 90 TABLET | Refills: 2 | Status: SHIPPED | OUTPATIENT
Start: 2020-03-28 | End: 2020-10-22

## 2020-04-25 RX ORDER — LEVOTHYROXINE SODIUM 125 UG/1
TABLET ORAL
Qty: 90 TABLET | Refills: 1 | Status: SHIPPED | OUTPATIENT
Start: 2020-04-25 | End: 2020-09-10

## 2020-05-11 RX ORDER — PROPRANOLOL HYDROCHLORIDE 20 MG/1
TABLET ORAL
Qty: 180 TABLET | Refills: 1 | Status: SHIPPED | OUTPATIENT
Start: 2020-05-11 | End: 2020-11-05

## 2020-05-25 DIAGNOSIS — E03.4 HYPOTHYROIDISM DUE TO ACQUIRED ATROPHY OF THYROID: ICD-10-CM

## 2020-05-25 DIAGNOSIS — I10 ESSENTIAL HYPERTENSION: Primary | ICD-10-CM

## 2020-05-25 RX ORDER — TEMAZEPAM 30 MG/1
CAPSULE ORAL
Qty: 90 CAPSULE | Refills: 0 | Status: SHIPPED | OUTPATIENT
Start: 2020-05-25 | End: 2020-06-04 | Stop reason: SDUPTHER

## 2020-05-26 ENCOUNTER — TELEPHONE (OUTPATIENT)
Dept: FAMILY MEDICINE | Facility: CLINIC | Age: 85
End: 2020-05-26

## 2020-05-26 NOTE — TELEPHONE ENCOUNTER
Declined to reschedule her AWV appt, but pt did ask for a number to call back if she decides to r/s. My name and direct number given to pt.

## 2020-05-28 ENCOUNTER — LAB VISIT (OUTPATIENT)
Dept: LAB | Facility: HOSPITAL | Age: 85
End: 2020-05-28
Attending: FAMILY MEDICINE
Payer: MEDICARE

## 2020-05-28 DIAGNOSIS — I10 ESSENTIAL HYPERTENSION: ICD-10-CM

## 2020-05-28 DIAGNOSIS — E03.4 HYPOTHYROIDISM DUE TO ACQUIRED ATROPHY OF THYROID: ICD-10-CM

## 2020-05-28 LAB
ALBUMIN SERPL BCP-MCNC: 4 G/DL (ref 3.5–5.2)
ALP SERPL-CCNC: 88 U/L (ref 55–135)
ALT SERPL W/O P-5'-P-CCNC: 13 U/L (ref 10–44)
ANION GAP SERPL CALC-SCNC: 9 MMOL/L (ref 8–16)
AST SERPL-CCNC: 13 U/L (ref 10–40)
BILIRUB SERPL-MCNC: 0.4 MG/DL (ref 0.1–1)
BUN SERPL-MCNC: 27 MG/DL (ref 8–23)
CALCIUM SERPL-MCNC: 9.9 MG/DL (ref 8.7–10.5)
CHLORIDE SERPL-SCNC: 102 MMOL/L (ref 95–110)
CHOLEST SERPL-MCNC: 201 MG/DL (ref 120–199)
CHOLEST/HDLC SERPL: 4.5 {RATIO} (ref 2–5)
CO2 SERPL-SCNC: 28 MMOL/L (ref 23–29)
CREAT SERPL-MCNC: 0.8 MG/DL (ref 0.5–1.4)
ERYTHROCYTE [DISTWIDTH] IN BLOOD BY AUTOMATED COUNT: 14.4 % (ref 11.5–14.5)
EST. GFR  (AFRICAN AMERICAN): >60 ML/MIN/1.73 M^2
EST. GFR  (NON AFRICAN AMERICAN): >60 ML/MIN/1.73 M^2
GLUCOSE SERPL-MCNC: 97 MG/DL (ref 70–110)
HCT VFR BLD AUTO: 38.3 % (ref 37–48.5)
HDLC SERPL-MCNC: 45 MG/DL (ref 40–75)
HDLC SERPL: 22.4 % (ref 20–50)
HGB BLD-MCNC: 11.3 G/DL (ref 12–16)
LDLC SERPL CALC-MCNC: 122.2 MG/DL (ref 63–159)
MCH RBC QN AUTO: 28.9 PG (ref 27–31)
MCHC RBC AUTO-ENTMCNC: 29.5 G/DL (ref 32–36)
MCV RBC AUTO: 98 FL (ref 82–98)
NONHDLC SERPL-MCNC: 156 MG/DL
PLATELET # BLD AUTO: 312 K/UL (ref 150–350)
PMV BLD AUTO: 11.4 FL (ref 9.2–12.9)
POTASSIUM SERPL-SCNC: 4.1 MMOL/L (ref 3.5–5.1)
PROT SERPL-MCNC: 7.4 G/DL (ref 6–8.4)
RBC # BLD AUTO: 3.91 M/UL (ref 4–5.4)
SODIUM SERPL-SCNC: 139 MMOL/L (ref 136–145)
TRIGL SERPL-MCNC: 169 MG/DL (ref 30–150)
TSH SERPL DL<=0.005 MIU/L-ACNC: 2.89 UIU/ML (ref 0.4–4)
WBC # BLD AUTO: 9.1 K/UL (ref 3.9–12.7)

## 2020-05-28 PROCEDURE — 80061 LIPID PANEL: CPT | Mod: HCNC

## 2020-05-28 PROCEDURE — 36415 COLL VENOUS BLD VENIPUNCTURE: CPT | Mod: HCNC,PN

## 2020-05-28 PROCEDURE — 85027 COMPLETE CBC AUTOMATED: CPT | Mod: HCNC

## 2020-05-28 PROCEDURE — 80053 COMPREHEN METABOLIC PANEL: CPT | Mod: HCNC

## 2020-05-28 PROCEDURE — 84443 ASSAY THYROID STIM HORMONE: CPT | Mod: HCNC

## 2020-06-04 ENCOUNTER — OFFICE VISIT (OUTPATIENT)
Dept: FAMILY MEDICINE | Facility: CLINIC | Age: 85
End: 2020-06-04
Payer: MEDICARE

## 2020-06-04 VITALS
SYSTOLIC BLOOD PRESSURE: 134 MMHG | WEIGHT: 154.88 LBS | DIASTOLIC BLOOD PRESSURE: 56 MMHG | HEIGHT: 62 IN | BODY MASS INDEX: 28.5 KG/M2 | TEMPERATURE: 98 F

## 2020-06-04 DIAGNOSIS — R25.1 TREMOR: ICD-10-CM

## 2020-06-04 DIAGNOSIS — N39.3 STRESS INCONTINENCE: ICD-10-CM

## 2020-06-04 DIAGNOSIS — I10 ESSENTIAL HYPERTENSION: Primary | ICD-10-CM

## 2020-06-04 DIAGNOSIS — E03.4 HYPOTHYROIDISM DUE TO ACQUIRED ATROPHY OF THYROID: ICD-10-CM

## 2020-06-04 DIAGNOSIS — G47.01 INSOMNIA DUE TO MEDICAL CONDITION: ICD-10-CM

## 2020-06-04 PROCEDURE — 99999 PR PBB SHADOW E&M-EST. PATIENT-LVL III: CPT | Mod: PBBFAC,HCNC,, | Performed by: FAMILY MEDICINE

## 2020-06-04 PROCEDURE — 99499 UNLISTED E&M SERVICE: CPT | Mod: HCNC,S$GLB,, | Performed by: FAMILY MEDICINE

## 2020-06-04 PROCEDURE — 99214 PR OFFICE/OUTPT VISIT, EST, LEVL IV, 30-39 MIN: ICD-10-PCS | Mod: HCNC,S$GLB,, | Performed by: FAMILY MEDICINE

## 2020-06-04 PROCEDURE — 1101F PT FALLS ASSESS-DOCD LE1/YR: CPT | Mod: HCNC,CPTII,S$GLB, | Performed by: FAMILY MEDICINE

## 2020-06-04 PROCEDURE — 1101F PR PT FALLS ASSESS DOC 0-1 FALLS W/OUT INJ PAST YR: ICD-10-PCS | Mod: HCNC,CPTII,S$GLB, | Performed by: FAMILY MEDICINE

## 2020-06-04 PROCEDURE — 3075F SYST BP GE 130 - 139MM HG: CPT | Mod: HCNC,CPTII,S$GLB, | Performed by: FAMILY MEDICINE

## 2020-06-04 PROCEDURE — 1159F MED LIST DOCD IN RCRD: CPT | Mod: HCNC,S$GLB,, | Performed by: FAMILY MEDICINE

## 2020-06-04 PROCEDURE — 3078F DIAST BP <80 MM HG: CPT | Mod: HCNC,CPTII,S$GLB, | Performed by: FAMILY MEDICINE

## 2020-06-04 PROCEDURE — 3075F PR MOST RECENT SYSTOLIC BLOOD PRESS GE 130-139MM HG: ICD-10-PCS | Mod: HCNC,CPTII,S$GLB, | Performed by: FAMILY MEDICINE

## 2020-06-04 PROCEDURE — 3078F PR MOST RECENT DIASTOLIC BLOOD PRESSURE < 80 MM HG: ICD-10-PCS | Mod: HCNC,CPTII,S$GLB, | Performed by: FAMILY MEDICINE

## 2020-06-04 PROCEDURE — 99499 RISK ADDL DX/OHS AUDIT: ICD-10-PCS | Mod: HCNC,S$GLB,, | Performed by: FAMILY MEDICINE

## 2020-06-04 PROCEDURE — 99214 OFFICE O/P EST MOD 30 MIN: CPT | Mod: HCNC,S$GLB,, | Performed by: FAMILY MEDICINE

## 2020-06-04 PROCEDURE — 1159F PR MEDICATION LIST DOCUMENTED IN MEDICAL RECORD: ICD-10-PCS | Mod: HCNC,S$GLB,, | Performed by: FAMILY MEDICINE

## 2020-06-04 PROCEDURE — 99999 PR PBB SHADOW E&M-EST. PATIENT-LVL III: ICD-10-PCS | Mod: PBBFAC,HCNC,, | Performed by: FAMILY MEDICINE

## 2020-06-04 RX ORDER — TEMAZEPAM 30 MG/1
CAPSULE ORAL
Qty: 90 CAPSULE | Refills: 1 | Status: SHIPPED | OUTPATIENT
Start: 2020-06-04 | End: 2020-12-02 | Stop reason: SDUPTHER

## 2020-06-04 NOTE — PROGRESS NOTES
"Subjective:       Patient ID: Bhakti Phipps is a 85 y.o. female.    Chief Complaint: follow up wrist pain    She is here for follow-up.  She has hypertension treated with hydrochlorothiazide.  She has hypothyroidism treated with levothyroxine.  She does have fatigue but her TSH is normal and her dose of levothyroxine has been steady.  She does take Detrol LA in the morning and I wonder if that makes her a little sleepy.  She takes temazepam every night for sleep.  She tries not to take lorazepam as well.  Her blood pressure has been well controlled.  She complains of some low back pain that goes into both legs.  That was worse after doing yd work and has improved somewhat recently.  She has macular degeneration and sees her eye specialist every 6 months.  She has a chronic tremor and takes propranolol.  She no longer is taking aspirin.  She has some mild triglyceride elevation recently.    Review of Systems   Constitutional: Positive for fatigue. Negative for appetite change, fever and unexpected weight change.   Respiratory: Negative for cough and shortness of breath.    Cardiovascular: Negative for chest pain, palpitations and leg swelling.   Musculoskeletal: Positive for back pain.   Neurological: Positive for tremors.       Objective:     Blood pressure (!) 134/56, pulse (P) 67, temperature 98.2 °F (36.8 °C), temperature source Oral, height 5' 2" (1.575 m), weight 70.2 kg (154 lb 14 oz).      Physical Exam   Constitutional: She appears well-developed and well-nourished. No distress.   Cardiovascular: Normal rate, regular rhythm, normal heart sounds and intact distal pulses.   No murmur heard.  Pulmonary/Chest: Effort normal and breath sounds normal. No respiratory distress.   Abdominal: She exhibits no distension and no mass. There is no tenderness.   Musculoskeletal: She exhibits no edema.   Neurological: She is alert.       Assessment:       1. Essential hypertension    2. 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 Stress incontinence        Plan:       I reviewed her recent lab work.  Continue present medication.  She reports easy bleeding when she takes aspirin and I am comfortable with her not taking it.  We discussed the Shingrix vaccine and I gave her a written prescription.  Follow-up in 6 months.

## 2020-06-18 RX ORDER — LORAZEPAM 1 MG/1
1 TABLET ORAL NIGHTLY
Qty: 90 TABLET | Refills: 0 | Status: SHIPPED | OUTPATIENT
Start: 2020-06-18 | End: 2020-12-02 | Stop reason: SDUPTHER

## 2020-06-18 NOTE — TELEPHONE ENCOUNTER
----- Message from Theo Coleman sent at 6/18/2020 12:22 PM CDT -----  Type:  RX Refill Request    Who Called:  Bambi/Subhash   Refill or New Rx:  Refill  RX Name and Strength: LORazepam (ATIVAN) 1 MG tablet  How is the patient currently taking it? (ex. 1XDay):  1XDay in Evening  Is this a 30 day or 90 day RX:  90  Preferred Pharmacy with phone number:      [facility] - [facility] [address]  [address] [facility] [address]  Phone: [phone] Fax: [phone]          Local or Mail Order:  Mail Order  Ordering Provider:  Juventino Mahmood Call Back Number:  [phone]  Additional Information:

## 2020-07-15 ENCOUNTER — OFFICE VISIT (OUTPATIENT)
Dept: UROLOGY | Facility: CLINIC | Age: 85
End: 2020-07-15
Payer: MEDICARE

## 2020-07-15 DIAGNOSIS — N39.42 INCONTINENCE WITHOUT SENSORY AWARENESS: ICD-10-CM

## 2020-07-15 DIAGNOSIS — N39.3 STRESS INCONTINENCE: Primary | ICD-10-CM

## 2020-07-15 LAB
BILIRUB SERPL-MCNC: NORMAL MG/DL
BLOOD URINE, POC: NORMAL
CLARITY, POC UA: CLEAR
COLOR, POC UA: YELLOW
GLUCOSE UR QL STRIP: NORMAL
KETONES UR QL STRIP: NORMAL
LEUKOCYTE ESTERASE URINE, POC: NORMAL
NITRITE, POC UA: NORMAL
PH, POC UA: 5.5
PROTEIN, POC: NORMAL
SPECIFIC GRAVITY, POC UA: 1.02
UROBILINOGEN, POC UA: 0.2

## 2020-07-15 PROCEDURE — 99999 PR PBB SHADOW E&M-EST. PATIENT-LVL III: ICD-10-PCS | Mod: PBBFAC,HCNC,, | Performed by: UROLOGY

## 2020-07-15 PROCEDURE — 1101F PT FALLS ASSESS-DOCD LE1/YR: CPT | Mod: HCNC,CPTII,S$GLB, | Performed by: UROLOGY

## 2020-07-15 PROCEDURE — 99204 PR OFFICE/OUTPT VISIT, NEW, LEVL IV, 45-59 MIN: ICD-10-PCS | Mod: HCNC,S$GLB,, | Performed by: UROLOGY

## 2020-07-15 PROCEDURE — 1159F PR MEDICATION LIST DOCUMENTED IN MEDICAL RECORD: ICD-10-PCS | Mod: HCNC,S$GLB,, | Performed by: UROLOGY

## 2020-07-15 PROCEDURE — 81002 URINALYSIS NONAUTO W/O SCOPE: CPT | Mod: HCNC,S$GLB,, | Performed by: UROLOGY

## 2020-07-15 PROCEDURE — 1101F PR PT FALLS ASSESS DOC 0-1 FALLS W/OUT INJ PAST YR: ICD-10-PCS | Mod: HCNC,CPTII,S$GLB, | Performed by: UROLOGY

## 2020-07-15 PROCEDURE — 1126F AMNT PAIN NOTED NONE PRSNT: CPT | Mod: HCNC,S$GLB,, | Performed by: UROLOGY

## 2020-07-15 PROCEDURE — 99204 OFFICE O/P NEW MOD 45 MIN: CPT | Mod: HCNC,S$GLB,, | Performed by: UROLOGY

## 2020-07-15 PROCEDURE — 1159F MED LIST DOCD IN RCRD: CPT | Mod: HCNC,S$GLB,, | Performed by: UROLOGY

## 2020-07-15 PROCEDURE — 99999 PR PBB SHADOW E&M-EST. PATIENT-LVL III: CPT | Mod: PBBFAC,HCNC,, | Performed by: UROLOGY

## 2020-07-15 PROCEDURE — 81002 POCT URINE DIPSTICK WITHOUT MICROSCOPE: ICD-10-PCS | Mod: HCNC,S$GLB,, | Performed by: UROLOGY

## 2020-07-15 PROCEDURE — 1126F PR PAIN SEVERITY QUANTIFIED, NO PAIN PRESENT: ICD-10-PCS | Mod: HCNC,S$GLB,, | Performed by: UROLOGY

## 2020-07-15 NOTE — PROGRESS NOTES
UROLOGY [place]  7 15 20    Cc urinary incontinence    Age 86. For about three years, has leakage of urine and she is not even aware. She denies having leakage of an urgency type, on the way to the bathroom. If she coughs or sneezes she has added urine loss, but says there is no need for coughing to make her leak. No pains or burning. Voiding stream is normal.     Pt has already failed the use of ditropan 5 mg and detrol LA 2 mg.    Has not had problems with recurrent uti's.    PMH    Surgical:  has a past surgical history that includes Joint replacement; Hysterectomy; Radical neck dissection; and Cholecystectomy (10-11).    Medical:  has a past medical history of Anxiety, Arthritis, History of melanoma, HTN (hypertension), Hypertension, Hypothyroid, Hypothyroidism, Insomnia, Melanoma, Osteoarthritis, and Tremor.    Familial: father had leukemia, mother had pancreatinc cancer, brother lung cancer, sister liver cancer.    Social: , lives in [place].    Meds:   Current Outpatient Medications on File Prior to Visit   Medication Sig Dispense Refill    hydroCHLOROthiazide (HYDRODIURIL) 12.5 MG Tab TAKE 1 TABLET EVERY DAY 90 tablet 2    levothyroxine (SYNTHROID) 125 MCG tablet TAKE 1 TABLET EVERY DAY 90 tablet 1    LORazepam (ATIVAN) 1 MG tablet Take 1 tablet (1 mg total) by  90 tablet 0    magnesium oxide 500 mg Tab Take 1 ty.      naproxen (NAPROSYN) 500 MG tablet TAKE 1 TABLET  30 tablet 2    propranoloL (INDERAL) 20 MG tablet TAKE 1 TABLET TWICE DAILY 180 tablet 1    temazepam (RESTORIL) 30 mg capsule TAKE 1 CAPSULE BY MO 90 capsule 1    venlafaxine (EFFEXOR-XR) 75 MG 24 hr capsule TAKE 1 CAPSULE EVERY DAY 90 capsule 2    VIT C/E/ZN/COPPR/LUTEIN/ZEAXAN (PRESERVISION AR Take by mouth.       REVIEW OF SYSTEMS  GENERAL:  No headaches or dizzy spells.   HEENT: vision needs glasses. Sinuses: No complaints.   CARDIOPULMONARY: No swelling of the legs; no chest pain. No shortness of breath.    GASTROINTESTINAL: No heartburn. Denies diarrhea; has constipation, no blood or mucus in stools.   GENITOURINARY: Denies dysuria, bleeding or incontinence.   MUSCULOSKELETAL: has arthritic complaints such as pain or stiffness.   PSYCHIATRIC: No history of depression or anxiety.   ENDOCRINOLOGIC: No reports of sweating, cold or heat intolerance. No polyuria or polydipsia.   NEUROLOGICAL: No dizziness, syncope, paralysis  LYMPHATICS: No enlarged nodes. No history of splenectomy.    Pt alert, oriented, no distress  HEENT: wnl.  Neck: supple, no JVD, no lymphadenopathy  Chest: CV NSR  Lungs: normal chest expansion, no labored breathing  Abdomen flat, nontender, no organomegaly, no masses.  Extremities: no edema, peripheral pulses nl  Neuro: preserved    IMP  Urinary incontinence without awareness  Stress urinary incontinence (mild)    Explained kegel exercises in detail  Not good prognosis for urinary incontinence in the elderly with no sensory awareness  Diaper use prn  Avoid overhydration  RTC yearly or prn

## 2020-07-15 NOTE — LETTER
July 24, 2020      García Jauregui MD  [address]  [facility] [address]           [facility]  [address] [place] [address]  Phone: [phone]  Fax: [phone]          Patient: Bhakti Phipps   MR Number: [id number]   YOB: 1934   Date of Visit: 7/15/2020       Dear Dr. García Jauregui:    Thank you for referring Bhakti Phipps to me for evaluation. Attached you will find relevant portions of my assessment and plan of care.    If you have questions, please do not hesitate to call me. I look forward to following Bhakti Phipps along with you.    Sincerely,    Tyrone Malagon MD    Enclosure  CC:  No Recipients    If you would like to receive this communication electronically, please contact [email or web address] or [phone] to request more information on [facility] [employer] access.    For providers and/or their staff who would like to refer a patient to [facility], please contact us through our one-stop-shop provider referral line, [facility], at [phone].    If you feel you have received this communication in error or would no longer like to receive these types of communications, please e-mail [email or web address]

## 2020-08-18 ENCOUNTER — PES CALL (OUTPATIENT)
Dept: ADMINISTRATIVE | Facility: CLINIC | Age: 85
End: 2020-08-18

## 2020-09-17 ENCOUNTER — TELEPHONE (OUTPATIENT)
Dept: FAMILY MEDICINE | Facility: CLINIC | Age: 85
End: 2020-09-17

## 2020-09-17 NOTE — TELEPHONE ENCOUNTER
----- Message from Wilda Caro MA sent at 9/17/2020 11:19 AM CDT -----  Regarding: CB  PT is requesting a call back in regards to mammo orders and PT wants to know if she still needs to have  them done due to her age  Call back [phone]

## 2020-12-02 NOTE — TELEPHONE ENCOUNTER
Rec'd refill request from [employer] for temazepam.  Pt due for 6 mo f/u. Spoke w/ pt. Appt made for 2pm on 12/17/20. Please approve. Last refill date 6/4/20 #90 x 1R

## 2020-12-03 RX ORDER — LORAZEPAM 1 MG/1
1 TABLET ORAL NIGHTLY
Qty: 90 TABLET | Refills: 0 | Status: SHIPPED | OUTPATIENT
Start: 2020-12-03 | End: 2021-03-09 | Stop reason: SDUPTHER

## 2020-12-03 RX ORDER — TEMAZEPAM 30 MG/1
CAPSULE ORAL
Qty: 90 CAPSULE | Refills: 1 | Status: SHIPPED | OUTPATIENT
Start: 2020-12-03 | End: 2021-06-28 | Stop reason: SDUPTHER

## 2021-01-05 ENCOUNTER — OFFICE VISIT (OUTPATIENT)
Dept: FAMILY MEDICINE | Facility: CLINIC | Age: 86
End: 2021-01-05
Payer: MEDICARE

## 2021-01-05 ENCOUNTER — LAB VISIT (OUTPATIENT)
Dept: LAB | Facility: HOSPITAL | Age: 86
End: 2021-01-05
Attending: FAMILY MEDICINE
Payer: MEDICARE

## 2021-01-05 VITALS
DIASTOLIC BLOOD PRESSURE: 66 MMHG | BODY MASS INDEX: 29.11 KG/M2 | SYSTOLIC BLOOD PRESSURE: 152 MMHG | HEIGHT: 61 IN | HEART RATE: 68 BPM | WEIGHT: 154.19 LBS | TEMPERATURE: 99 F

## 2021-01-05 DIAGNOSIS — G47.01 INSOMNIA DUE TO MEDICAL CONDITION: ICD-10-CM

## 2021-01-05 DIAGNOSIS — R25.1 TREMOR: ICD-10-CM

## 2021-01-05 DIAGNOSIS — F41.9 ANXIETY: ICD-10-CM

## 2021-01-05 DIAGNOSIS — I10 ESSENTIAL HYPERTENSION: ICD-10-CM

## 2021-01-05 DIAGNOSIS — I10 ESSENTIAL HYPERTENSION: Primary | ICD-10-CM

## 2021-01-05 DIAGNOSIS — N39.3 STRESS INCONTINENCE: ICD-10-CM

## 2021-01-05 PROCEDURE — 99214 OFFICE O/P EST MOD 30 MIN: CPT | Mod: S$GLB,,, | Performed by: FAMILY MEDICINE

## 2021-01-05 PROCEDURE — 3288F PR FALLS RISK ASSESSMENT DOCUMENTED: ICD-10-PCS | Mod: CPTII,S$GLB,, | Performed by: FAMILY MEDICINE

## 2021-01-05 PROCEDURE — 99499 RISK ADDL DX/OHS AUDIT: ICD-10-PCS | Mod: S$GLB,,, | Performed by: FAMILY MEDICINE

## 2021-01-05 PROCEDURE — 99499 UNLISTED E&M SERVICE: CPT | Mod: S$GLB,,, | Performed by: FAMILY MEDICINE

## 2021-01-05 PROCEDURE — 1101F PT FALLS ASSESS-DOCD LE1/YR: CPT | Mod: CPTII,S$GLB,, | Performed by: FAMILY MEDICINE

## 2021-01-05 PROCEDURE — 3288F FALL RISK ASSESSMENT DOCD: CPT | Mod: CPTII,S$GLB,, | Performed by: FAMILY MEDICINE

## 2021-01-05 PROCEDURE — 36415 COLL VENOUS BLD VENIPUNCTURE: CPT | Mod: HCNC,PN

## 2021-01-05 PROCEDURE — 1126F PR PAIN SEVERITY QUANTIFIED, NO PAIN PRESENT: ICD-10-PCS | Mod: S$GLB,,, | Performed by: FAMILY MEDICINE

## 2021-01-05 PROCEDURE — 85027 COMPLETE CBC AUTOMATED: CPT | Mod: HCNC

## 2021-01-05 PROCEDURE — 99214 PR OFFICE/OUTPT VISIT, EST, LEVL IV, 30-39 MIN: ICD-10-PCS | Mod: S$GLB,,, | Performed by: FAMILY MEDICINE

## 2021-01-05 PROCEDURE — 1159F PR MEDICATION LIST DOCUMENTED IN MEDICAL RECORD: ICD-10-PCS | Mod: S$GLB,,, | Performed by: FAMILY MEDICINE

## 2021-01-05 PROCEDURE — 1159F MED LIST DOCD IN RCRD: CPT | Mod: S$GLB,,, | Performed by: FAMILY MEDICINE

## 2021-01-05 PROCEDURE — 1101F PR PT FALLS ASSESS DOC 0-1 FALLS W/OUT INJ PAST YR: ICD-10-PCS | Mod: CPTII,S$GLB,, | Performed by: FAMILY MEDICINE

## 2021-01-05 PROCEDURE — 80053 COMPREHEN METABOLIC PANEL: CPT | Mod: HCNC

## 2021-01-05 PROCEDURE — 1126F AMNT PAIN NOTED NONE PRSNT: CPT | Mod: S$GLB,,, | Performed by: FAMILY MEDICINE

## 2021-01-06 LAB
ALBUMIN SERPL BCP-MCNC: 4.2 G/DL (ref 3.5–5.2)
ALP SERPL-CCNC: 91 U/L (ref 55–135)
ALT SERPL W/O P-5'-P-CCNC: 16 U/L (ref 10–44)
ANION GAP SERPL CALC-SCNC: 10 MMOL/L (ref 8–16)
AST SERPL-CCNC: 19 U/L (ref 10–40)
BILIRUB SERPL-MCNC: 0.4 MG/DL (ref 0.1–1)
BUN SERPL-MCNC: 22 MG/DL (ref 8–23)
CALCIUM SERPL-MCNC: 9.5 MG/DL (ref 8.7–10.5)
CHLORIDE SERPL-SCNC: 100 MMOL/L (ref 95–110)
CO2 SERPL-SCNC: 29 MMOL/L (ref 23–29)
CREAT SERPL-MCNC: 0.8 MG/DL (ref 0.5–1.4)
ERYTHROCYTE [DISTWIDTH] IN BLOOD BY AUTOMATED COUNT: 14.7 % (ref 11.5–14.5)
EST. GFR  (AFRICAN AMERICAN): >60 ML/MIN/1.73 M^2
EST. GFR  (NON AFRICAN AMERICAN): >60 ML/MIN/1.73 M^2
GLUCOSE SERPL-MCNC: 91 MG/DL (ref 70–110)
HCT VFR BLD AUTO: 36.3 % (ref 37–48.5)
HGB BLD-MCNC: 10.9 G/DL (ref 12–16)
MCH RBC QN AUTO: 28.9 PG (ref 27–31)
MCHC RBC AUTO-ENTMCNC: 30 G/DL (ref 32–36)
MCV RBC AUTO: 96 FL (ref 82–98)
PLATELET # BLD AUTO: 337 K/UL (ref 150–350)
PMV BLD AUTO: 11.5 FL (ref 9.2–12.9)
POTASSIUM SERPL-SCNC: 4.4 MMOL/L (ref 3.5–5.1)
PROT SERPL-MCNC: 7.5 G/DL (ref 6–8.4)
RBC # BLD AUTO: 3.77 M/UL (ref 4–5.4)
SODIUM SERPL-SCNC: 139 MMOL/L (ref 136–145)
WBC # BLD AUTO: 10.04 K/UL (ref 3.9–12.7)

## 2021-01-10 ENCOUNTER — IMMUNIZATION (OUTPATIENT)
Dept: FAMILY MEDICINE | Facility: CLINIC | Age: 86
End: 2021-01-10
Payer: MEDICARE

## 2021-01-10 DIAGNOSIS — Z23 NEED FOR VACCINATION: ICD-10-CM

## 2021-01-10 PROCEDURE — 91300 COVID-19, MRNA, LNP-S, PF, 30 MCG/0.3 ML DOSE VACCINE: CPT | Mod: PBBFAC | Performed by: FAMILY MEDICINE

## 2021-01-31 ENCOUNTER — IMMUNIZATION (OUTPATIENT)
Dept: FAMILY MEDICINE | Facility: CLINIC | Age: 86
End: 2021-01-31
Payer: MEDICARE

## 2021-01-31 DIAGNOSIS — Z23 NEED FOR VACCINATION: Primary | ICD-10-CM

## 2021-01-31 PROCEDURE — 0002A COVID-19, MRNA, LNP-S, PF, 30 MCG/0.3 ML DOSE VACCINE: CPT | Mod: PBBFAC | Performed by: INTERNAL MEDICINE

## 2021-01-31 PROCEDURE — 91300 COVID-19, MRNA, LNP-S, PF, 30 MCG/0.3 ML DOSE VACCINE: CPT | Mod: PBBFAC | Performed by: INTERNAL MEDICINE

## 2021-03-03 RX ORDER — LEVOTHYROXINE SODIUM 125 UG/1
TABLET ORAL
Qty: 90 TABLET | Refills: 3 | Status: SHIPPED | OUTPATIENT
Start: 2021-03-03 | End: 2021-10-24 | Stop reason: SDUPTHER

## 2021-03-09 RX ORDER — LORAZEPAM 1 MG/1
1 TABLET ORAL NIGHTLY
Qty: 90 TABLET | Refills: 0 | Status: SHIPPED | OUTPATIENT
Start: 2021-03-09 | End: 2021-06-28 | Stop reason: SDUPTHER

## 2021-03-13 RX ORDER — TOLTERODINE 2 MG/1
CAPSULE, EXTENDED RELEASE ORAL
Qty: 90 CAPSULE | Refills: 3 | Status: SHIPPED | OUTPATIENT
Start: 2021-03-13 | End: 2022-05-18

## 2021-04-29 ENCOUNTER — TELEPHONE (OUTPATIENT)
Dept: FAMILY MEDICINE | Facility: CLINIC | Age: 86
End: 2021-04-29

## 2021-06-08 ENCOUNTER — OFFICE VISIT (OUTPATIENT)
Dept: FAMILY MEDICINE | Facility: CLINIC | Age: 86
End: 2021-06-08
Payer: MEDICARE

## 2021-06-08 VITALS
WEIGHT: 152.56 LBS | DIASTOLIC BLOOD PRESSURE: 56 MMHG | BODY MASS INDEX: 28.8 KG/M2 | HEIGHT: 61 IN | SYSTOLIC BLOOD PRESSURE: 128 MMHG | HEART RATE: 66 BPM

## 2021-06-08 DIAGNOSIS — D50.9 MICROCYTIC ANEMIA: Primary | ICD-10-CM

## 2021-06-08 DIAGNOSIS — F41.9 ANXIETY DISORDER, UNSPECIFIED: ICD-10-CM

## 2021-06-08 DIAGNOSIS — M19.049 HAND ARTHRITIS: ICD-10-CM

## 2021-06-08 DIAGNOSIS — G47.01 INSOMNIA DUE TO MEDICAL CONDITION: ICD-10-CM

## 2021-06-08 DIAGNOSIS — I10 ESSENTIAL HYPERTENSION: ICD-10-CM

## 2021-06-08 PROCEDURE — 99214 PR OFFICE/OUTPT VISIT, EST, LEVL IV, 30-39 MIN: ICD-10-PCS | Mod: S$GLB,,, | Performed by: FAMILY MEDICINE

## 2021-06-08 PROCEDURE — 99999 PR PBB SHADOW E&M-EST. PATIENT-LVL IV: ICD-10-PCS | Mod: PBBFAC,,, | Performed by: FAMILY MEDICINE

## 2021-06-08 PROCEDURE — 99214 OFFICE O/P EST MOD 30 MIN: CPT | Mod: S$GLB,,, | Performed by: FAMILY MEDICINE

## 2021-06-08 PROCEDURE — 1125F AMNT PAIN NOTED PAIN PRSNT: CPT | Mod: S$GLB,,, | Performed by: FAMILY MEDICINE

## 2021-06-08 PROCEDURE — 1159F MED LIST DOCD IN RCRD: CPT | Mod: S$GLB,,, | Performed by: FAMILY MEDICINE

## 2021-06-08 PROCEDURE — 99499 RISK ADDL DX/OHS AUDIT: ICD-10-PCS | Mod: S$GLB,,, | Performed by: FAMILY MEDICINE

## 2021-06-08 PROCEDURE — 1159F PR MEDICATION LIST DOCUMENTED IN MEDICAL RECORD: ICD-10-PCS | Mod: S$GLB,,, | Performed by: FAMILY MEDICINE

## 2021-06-08 PROCEDURE — 99999 PR PBB SHADOW E&M-EST. PATIENT-LVL IV: CPT | Mod: PBBFAC,,, | Performed by: FAMILY MEDICINE

## 2021-06-08 PROCEDURE — 1125F PR PAIN SEVERITY QUANTIFIED, PAIN PRESENT: ICD-10-PCS | Mod: S$GLB,,, | Performed by: FAMILY MEDICINE

## 2021-06-08 PROCEDURE — 99499 UNLISTED E&M SERVICE: CPT | Mod: S$GLB,,, | Performed by: FAMILY MEDICINE

## 2021-06-08 RX ORDER — ESTRADIOL 0.1 MG/G
1 CREAM VAGINAL
Qty: 24 G | Refills: 3 | Status: SHIPPED | OUTPATIENT
Start: 2021-06-10 | End: 2022-04-16 | Stop reason: SDUPTHER

## 2021-06-09 ENCOUNTER — LAB VISIT (OUTPATIENT)
Dept: LAB | Facility: HOSPITAL | Age: 86
End: 2021-06-09
Attending: FAMILY MEDICINE
Payer: MEDICARE

## 2021-06-09 DIAGNOSIS — D50.9 MICROCYTIC ANEMIA: ICD-10-CM

## 2021-06-09 DIAGNOSIS — I10 ESSENTIAL HYPERTENSION: ICD-10-CM

## 2021-06-09 DIAGNOSIS — F41.9 ANXIETY DISORDER, UNSPECIFIED: ICD-10-CM

## 2021-06-09 LAB
ERYTHROCYTE [DISTWIDTH] IN BLOOD BY AUTOMATED COUNT: 14.9 % (ref 11.5–14.5)
HCT VFR BLD AUTO: 37.7 % (ref 37–48.5)
HGB BLD-MCNC: 11.4 G/DL (ref 12–16)
MCH RBC QN AUTO: 29.2 PG (ref 27–31)
MCHC RBC AUTO-ENTMCNC: 30.2 G/DL (ref 32–36)
MCV RBC AUTO: 96 FL (ref 82–98)
PLATELET # BLD AUTO: 358 K/UL (ref 150–450)
PMV BLD AUTO: 11.6 FL (ref 9.2–12.9)
RBC # BLD AUTO: 3.91 M/UL (ref 4–5.4)
WBC # BLD AUTO: 11.31 K/UL (ref 3.9–12.7)

## 2021-06-09 PROCEDURE — 84443 ASSAY THYROID STIM HORMONE: CPT | Performed by: FAMILY MEDICINE

## 2021-06-09 PROCEDURE — 85027 COMPLETE CBC AUTOMATED: CPT | Performed by: FAMILY MEDICINE

## 2021-06-09 PROCEDURE — 80061 LIPID PANEL: CPT | Performed by: FAMILY MEDICINE

## 2021-06-09 PROCEDURE — 36415 COLL VENOUS BLD VENIPUNCTURE: CPT | Mod: PN | Performed by: FAMILY MEDICINE

## 2021-06-09 PROCEDURE — 83540 ASSAY OF IRON: CPT | Performed by: FAMILY MEDICINE

## 2021-06-10 LAB
CHOLEST SERPL-MCNC: 181 MG/DL (ref 120–199)
CHOLEST/HDLC SERPL: 4.5 {RATIO} (ref 2–5)
HDLC SERPL-MCNC: 40 MG/DL (ref 40–75)
HDLC SERPL: 22.1 % (ref 20–50)
IRON SERPL-MCNC: 75 UG/DL (ref 30–160)
LDLC SERPL CALC-MCNC: 99.8 MG/DL (ref 63–159)
NONHDLC SERPL-MCNC: 141 MG/DL
SATURATED IRON: 17 % (ref 20–50)
TOTAL IRON BINDING CAPACITY: 441 UG/DL (ref 250–450)
TRANSFERRIN SERPL-MCNC: 298 MG/DL (ref 200–375)
TRIGL SERPL-MCNC: 206 MG/DL (ref 30–150)
TSH SERPL DL<=0.005 MIU/L-ACNC: 1.36 UIU/ML (ref 0.4–4)

## 2021-06-29 RX ORDER — LORAZEPAM 1 MG/1
1 TABLET ORAL NIGHTLY
Qty: 90 TABLET | Refills: 0 | Status: SHIPPED | OUTPATIENT
Start: 2021-06-29 | End: 2021-11-18 | Stop reason: SDUPTHER

## 2021-06-29 RX ORDER — TEMAZEPAM 30 MG/1
CAPSULE ORAL
Qty: 90 CAPSULE | Refills: 1 | Status: SHIPPED | OUTPATIENT
Start: 2021-06-29 | End: 2022-01-18 | Stop reason: SDUPTHER

## 2021-07-02 ENCOUNTER — TELEPHONE (OUTPATIENT)
Dept: FAMILY MEDICINE | Facility: CLINIC | Age: 86
End: 2021-07-02

## 2021-07-19 ENCOUNTER — TELEPHONE (OUTPATIENT)
Dept: FAMILY MEDICINE | Facility: CLINIC | Age: 86
End: 2021-07-19

## 2021-07-19 RX ORDER — TERCONAZOLE 4 MG/G
1 CREAM VAGINAL NIGHTLY
Qty: 45 G | Refills: 1 | Status: SHIPPED | OUTPATIENT
Start: 2021-07-19 | End: 2022-09-07

## 2021-08-18 ENCOUNTER — IMMUNIZATION (OUTPATIENT)
Dept: FAMILY MEDICINE | Facility: CLINIC | Age: 86
End: 2021-08-18
Payer: MEDICARE

## 2021-08-18 DIAGNOSIS — Z23 NEED FOR VACCINATION: Primary | ICD-10-CM

## 2021-08-18 PROCEDURE — 0003A COVID-19, MRNA, LNP-S, PF, 30 MCG/0.3 ML DOSE VACCINE: CPT | Mod: CV19,,, | Performed by: FAMILY MEDICINE

## 2021-08-18 PROCEDURE — 91300 COVID-19, MRNA, LNP-S, PF, 30 MCG/0.3 ML DOSE VACCINE: CPT | Mod: ,,, | Performed by: FAMILY MEDICINE

## 2021-08-18 PROCEDURE — 0003A COVID-19, MRNA, LNP-S, PF, 30 MCG/0.3 ML DOSE VACCINE: ICD-10-PCS | Mod: CV19,,, | Performed by: FAMILY MEDICINE

## 2021-08-18 PROCEDURE — 91300 COVID-19, MRNA, LNP-S, PF, 30 MCG/0.3 ML DOSE VACCINE: ICD-10-PCS | Mod: ,,, | Performed by: FAMILY MEDICINE

## 2021-11-02 ENCOUNTER — OFFICE VISIT (OUTPATIENT)
Dept: FAMILY MEDICINE | Facility: CLINIC | Age: 86
End: 2021-11-02
Payer: MEDICARE

## 2021-11-02 VITALS
SYSTOLIC BLOOD PRESSURE: 118 MMHG | HEIGHT: 62 IN | WEIGHT: 155.88 LBS | HEART RATE: 68 BPM | DIASTOLIC BLOOD PRESSURE: 70 MMHG | BODY MASS INDEX: 28.69 KG/M2

## 2021-11-02 DIAGNOSIS — S39.012D STRAIN OF LUMBAR REGION, SUBSEQUENT ENCOUNTER: Primary | ICD-10-CM

## 2021-11-02 DIAGNOSIS — M19.049 HAND ARTHRITIS: ICD-10-CM

## 2021-11-02 DIAGNOSIS — I10 ESSENTIAL HYPERTENSION: ICD-10-CM

## 2021-11-02 PROCEDURE — 99214 PR OFFICE/OUTPT VISIT, EST, LEVL IV, 30-39 MIN: ICD-10-PCS | Mod: HCNC,S$GLB,, | Performed by: FAMILY MEDICINE

## 2021-11-02 PROCEDURE — 1125F PR PAIN SEVERITY QUANTIFIED, PAIN PRESENT: ICD-10-PCS | Mod: HCNC,CPTII,S$GLB, | Performed by: FAMILY MEDICINE

## 2021-11-02 PROCEDURE — 1159F MED LIST DOCD IN RCRD: CPT | Mod: HCNC,CPTII,S$GLB, | Performed by: FAMILY MEDICINE

## 2021-11-02 PROCEDURE — 1159F PR MEDICATION LIST DOCUMENTED IN MEDICAL RECORD: ICD-10-PCS | Mod: HCNC,CPTII,S$GLB, | Performed by: FAMILY MEDICINE

## 2021-11-02 PROCEDURE — 3288F PR FALLS RISK ASSESSMENT DOCUMENTED: ICD-10-PCS | Mod: HCNC,CPTII,S$GLB, | Performed by: FAMILY MEDICINE

## 2021-11-02 PROCEDURE — 1160F PR REVIEW ALL MEDS BY PRESCRIBER/CLIN PHARMACIST DOCUMENTED: ICD-10-PCS | Mod: HCNC,CPTII,S$GLB, | Performed by: FAMILY MEDICINE

## 2021-11-02 PROCEDURE — 1100F PTFALLS ASSESS-DOCD GE2>/YR: CPT | Mod: HCNC,CPTII,S$GLB, | Performed by: FAMILY MEDICINE

## 2021-11-02 PROCEDURE — 99499 UNLISTED E&M SERVICE: CPT | Mod: S$GLB,,, | Performed by: FAMILY MEDICINE

## 2021-11-02 PROCEDURE — 99214 OFFICE O/P EST MOD 30 MIN: CPT | Mod: HCNC,S$GLB,, | Performed by: FAMILY MEDICINE

## 2021-11-02 PROCEDURE — 1160F RVW MEDS BY RX/DR IN RCRD: CPT | Mod: HCNC,CPTII,S$GLB, | Performed by: FAMILY MEDICINE

## 2021-11-02 PROCEDURE — 1125F AMNT PAIN NOTED PAIN PRSNT: CPT | Mod: HCNC,CPTII,S$GLB, | Performed by: FAMILY MEDICINE

## 2021-11-02 PROCEDURE — 99499 RISK ADDL DX/OHS AUDIT: ICD-10-PCS | Mod: S$GLB,,, | Performed by: FAMILY MEDICINE

## 2021-11-02 PROCEDURE — 99999 PR PBB SHADOW E&M-EST. PATIENT-LVL IV: ICD-10-PCS | Mod: PBBFAC,HCNC,, | Performed by: FAMILY MEDICINE

## 2021-11-02 PROCEDURE — 3288F FALL RISK ASSESSMENT DOCD: CPT | Mod: HCNC,CPTII,S$GLB, | Performed by: FAMILY MEDICINE

## 2021-11-02 PROCEDURE — 99999 PR PBB SHADOW E&M-EST. PATIENT-LVL IV: CPT | Mod: PBBFAC,HCNC,, | Performed by: FAMILY MEDICINE

## 2021-11-02 PROCEDURE — 1100F PR PT FALLS ASSESS DOC 2+ FALLS/FALL W/INJURY/YR: ICD-10-PCS | Mod: HCNC,CPTII,S$GLB, | Performed by: FAMILY MEDICINE

## 2021-11-02 RX ORDER — NAPROXEN 500 MG/1
500 TABLET ORAL 2 TIMES DAILY PRN
Qty: 30 TABLET | Refills: 2 | Status: SHIPPED | OUTPATIENT
Start: 2021-11-02 | End: 2022-05-18

## 2021-11-18 RX ORDER — LORAZEPAM 1 MG/1
1 TABLET ORAL NIGHTLY
Qty: 90 TABLET | Refills: 0 | Status: SHIPPED | OUTPATIENT
Start: 2021-11-18 | End: 2022-07-20

## 2021-12-09 ENCOUNTER — TELEPHONE (OUTPATIENT)
Dept: RHEUMATOLOGY | Facility: CLINIC | Age: 86
End: 2021-12-09
Payer: MEDICARE

## 2021-12-30 ENCOUNTER — TELEPHONE (OUTPATIENT)
Dept: RHEUMATOLOGY | Facility: CLINIC | Age: 86
End: 2021-12-30
Payer: MEDICARE

## 2022-01-18 RX ORDER — TEMAZEPAM 30 MG/1
CAPSULE ORAL
Qty: 30 CAPSULE | Refills: 3 | Status: SHIPPED | OUTPATIENT
Start: 2022-01-18 | End: 2022-05-18

## 2022-01-18 NOTE — TELEPHONE ENCOUNTER
----- Message from Helen Hernandez sent at 1/18/2022  3:21 PM CST -----  Regarding: Call Back  Who Called: pt         What is the reason for the call: [facility] pharmacy stated they could not get in contact with office to get refill for temazepam (RESTORIL) 30 mg capsule to [facility] [address]. Please contact to further assist.           Can patient be contacted on [employer]: n/a         Call back number: [phone]

## 2022-01-18 NOTE — TELEPHONE ENCOUNTER
Care Due:                  Date            Visit Type   Department     Provider  --------------------------------------------------------------------------------                                             [facility] FAM[facility]  Last Visit: 11-      None         MEDICINE       García Jauregui  Next Visit: None Scheduled  None         None Found                                                            Last  Test          Frequency    Reason                     Performed    Due Date  --------------------------------------------------------------------------------    CMP.........  12 months..  hydroCHLOROthiazide,       Not Found    Overdue                             venlafaxine..............    Powered by [employer]. Reference number: [id number].   1/18/2022 4:08:07 PM CST

## 2022-03-10 ENCOUNTER — PATIENT MESSAGE (OUTPATIENT)
Dept: RHEUMATOLOGY | Facility: CLINIC | Age: 87
End: 2022-03-10
Payer: MEDICARE

## 2022-04-06 ENCOUNTER — TELEPHONE (OUTPATIENT)
Dept: FAMILY MEDICINE | Facility: CLINIC | Age: 87
End: 2022-04-06
Payer: MEDICARE

## 2022-04-06 NOTE — TELEPHONE ENCOUNTER
----- Message from Yasmin Hawley sent at 4/6/2022 10:37 AM CDT -----  Regarding: returning call  Contact: patient  Type:  Patient Returning Call    Who Called:  patient  Who Left Message for Patient:  Nicky  Does the patient know what this is regarding?:  prescription for allergies  Best Call Back Number:  [phone] (home)   Additional Information:  Please call patient. Thanks!

## 2022-05-18 ENCOUNTER — OFFICE VISIT (OUTPATIENT)
Dept: FAMILY MEDICINE | Facility: CLINIC | Age: 87
End: 2022-05-18
Payer: MEDICARE

## 2022-05-18 VITALS
HEART RATE: 74 BPM | SYSTOLIC BLOOD PRESSURE: 128 MMHG | HEIGHT: 62 IN | DIASTOLIC BLOOD PRESSURE: 62 MMHG | BODY MASS INDEX: 28.36 KG/M2 | RESPIRATION RATE: 18 BRPM | OXYGEN SATURATION: 97 % | WEIGHT: 154.13 LBS

## 2022-05-18 DIAGNOSIS — R42 LIGHTHEADEDNESS: ICD-10-CM

## 2022-05-18 DIAGNOSIS — T50.905A ADVERSE EFFECT OF DRUG, INITIAL ENCOUNTER: ICD-10-CM

## 2022-05-18 DIAGNOSIS — R29.6 FALLS FREQUENTLY: Primary | ICD-10-CM

## 2022-05-18 DIAGNOSIS — I10 PRIMARY HYPERTENSION: ICD-10-CM

## 2022-05-18 DIAGNOSIS — G47.01 INSOMNIA DUE TO MEDICAL CONDITION: ICD-10-CM

## 2022-05-18 DIAGNOSIS — I77.1 TORTUOUS AORTA: ICD-10-CM

## 2022-05-18 PROCEDURE — 1160F PR REVIEW ALL MEDS BY PRESCRIBER/CLIN PHARMACIST DOCUMENTED: ICD-10-PCS | Mod: CPTII,S$GLB,, | Performed by: INTERNAL MEDICINE

## 2022-05-18 PROCEDURE — 1100F PTFALLS ASSESS-DOCD GE2>/YR: CPT | Mod: CPTII,S$GLB,, | Performed by: INTERNAL MEDICINE

## 2022-05-18 PROCEDURE — 99214 PR OFFICE/OUTPT VISIT, EST, LEVL IV, 30-39 MIN: ICD-10-PCS | Mod: S$GLB,,, | Performed by: INTERNAL MEDICINE

## 2022-05-18 PROCEDURE — 99214 OFFICE O/P EST MOD 30 MIN: CPT | Mod: S$GLB,,, | Performed by: INTERNAL MEDICINE

## 2022-05-18 PROCEDURE — 1126F PR PAIN SEVERITY QUANTIFIED, NO PAIN PRESENT: ICD-10-PCS | Mod: CPTII,S$GLB,, | Performed by: INTERNAL MEDICINE

## 2022-05-18 PROCEDURE — 1126F AMNT PAIN NOTED NONE PRSNT: CPT | Mod: CPTII,S$GLB,, | Performed by: INTERNAL MEDICINE

## 2022-05-18 PROCEDURE — 99499 UNLISTED E&M SERVICE: CPT | Mod: S$GLB,,, | Performed by: INTERNAL MEDICINE

## 2022-05-18 PROCEDURE — 99999 PR PBB SHADOW E&M-EST. PATIENT-LVL V: CPT | Mod: PBBFAC,,, | Performed by: INTERNAL MEDICINE

## 2022-05-18 PROCEDURE — 99499 RISK ADDL DX/OHS AUDIT: ICD-10-PCS | Mod: S$GLB,,, | Performed by: INTERNAL MEDICINE

## 2022-05-18 PROCEDURE — 3288F FALL RISK ASSESSMENT DOCD: CPT | Mod: CPTII,S$GLB,, | Performed by: INTERNAL MEDICINE

## 2022-05-18 PROCEDURE — 1159F PR MEDICATION LIST DOCUMENTED IN MEDICAL RECORD: ICD-10-PCS | Mod: CPTII,S$GLB,, | Performed by: INTERNAL MEDICINE

## 2022-05-18 PROCEDURE — 3288F PR FALLS RISK ASSESSMENT DOCUMENTED: ICD-10-PCS | Mod: CPTII,S$GLB,, | Performed by: INTERNAL MEDICINE

## 2022-05-18 PROCEDURE — 1100F PR PT FALLS ASSESS DOC 2+ FALLS/FALL W/INJURY/YR: ICD-10-PCS | Mod: CPTII,S$GLB,, | Performed by: INTERNAL MEDICINE

## 2022-05-18 PROCEDURE — 99999 PR PBB SHADOW E&M-EST. PATIENT-LVL V: ICD-10-PCS | Mod: PBBFAC,,, | Performed by: INTERNAL MEDICINE

## 2022-05-18 PROCEDURE — 1160F RVW MEDS BY RX/DR IN RCRD: CPT | Mod: CPTII,S$GLB,, | Performed by: INTERNAL MEDICINE

## 2022-05-18 PROCEDURE — 1159F MED LIST DOCD IN RCRD: CPT | Mod: CPTII,S$GLB,, | Performed by: INTERNAL MEDICINE

## 2022-05-18 RX ORDER — NABUMETONE 500 MG/1
500 TABLET, FILM COATED ORAL DAILY
COMMUNITY
Start: 2022-03-21 | End: 2022-10-19 | Stop reason: SDUPTHER

## 2022-05-18 RX ORDER — TEMAZEPAM 15 MG/1
15 CAPSULE ORAL NIGHTLY PRN
Qty: 30 CAPSULE | Refills: 0 | Status: SHIPPED | OUTPATIENT
Start: 2022-05-18 | End: 2022-06-15

## 2022-05-18 NOTE — PROGRESS NOTES
Assessment and Plan:    1. Falls frequently  - Ambulatory referral/consult to Home Health; Future  2. Lightheadedness  - Ambulatory referral/consult to Home Health; Future  3. Adverse effect of drug, initial encounter  - Ambulatory referral/consult to Home Health; Future    Discussed today that I am concerned that some of her medications may be increasing dizziness/lightheadedness and increasing her risk of falls. Possible contribution of HCTZ causing orthostatic lightheadedness. Will hold this for now. If BP increasing, would consider adding ARB instead as she has no edema to cause us to choose HCTZ specifically. Will have this monitored by home health and follow up in 1 month.     Discussed also that benzodiazepines are not ideal in elderly patients as these can increase the risk of falls and cause dizziness. Reports that she uses the lorazepam only rarely, so have encouraged her to take this only when severely anxious and the lowest dose possible. Ideally would like to get off of temazepam. For now, will decrease to 15 mg nightly for 1 month, then discussed further taper and other medication options next visit in 1 month.    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  Dispense: 30 capsule; Refill: 0    5. Primary hypertension  Hold HCTZ as above    6. Tortuous aorta  Noted on prior imaging. No known vascular disease. Not on statin in the past and not clear that adding one at age 87 would be beneficial, so will hold for now unless she develops clinical ASCVD.      ______________________________________________________________________  Subjective:    Chief Complaint:  Establish care    HPI:  Bhakti is a 87 y.o. year old woman here to establish care. Former patient of Dr. Jauregui.     She currently lives alone. Her grandson is planning on possibly moving in, and she has a lot of family nearby.     She has had 4 falls in the last few  months. She is not able to get up on her own when she has fallen some of the time. She does not feel sure footed. Sometimes feels more clumsy. Does feel lightheaded when standing a lot of the time.     HTN- Taking HCTZ 12.5, also on propranolol for tremor. She would like to discuss stopping HCTZ. Feels like this is worsening her stress incontinence and wonders if this is contributing to her lightheadedness as well. She does not typically check her BP at home. She has never had leg edema.     Hypothyroidism- Taking levothyroxine 125. No problems with this.     Tremor- Taking propranolol 20 mg BID.    Insomnia- Has been taking temazepam 30 mg nightly. Notes that she has not ever really taken anything else. She reports that she does not feel like she needs as high of a dose.     Anxiety- Taking venlafaxine 75 mg daily and using lorazepam PRN. Last filled 30 pills on Jan 5th. States that when she takes this it usually only a half pill.    She uses estrace cream twice a week for atrophic vaginitis. She does have a history of stress incontinence. Has failed medication trials for incontinence in the past.     Past Medical History:  Past Medical History:   Diagnosis Date    Anxiety     Arthritis     History of melanoma     at age of 26    HTN (hypertension) 5/21/2012    Hypertension     Hypothyroid 5/21/2012    Hypothyroidism     Insomnia 2/25/2014    Melanoma     age 26    Osteoarthritis 5/21/2012    Tremor        Past Surgical History:  Past Surgical History:   Procedure Laterality Date    CHOLECYSTECTOMY  10-11    lap ayse    HYSTERECTOMY      with oopherectomy    JOINT REPLACEMENT      L knee    RADICAL NECK DISSECTION      for melanoma       Family History:  Family History   Problem Relation Age of Onset    Pancreatic cancer Mother     Leukemia Father     Lung cancer Brother     Liver cancer Sister        Social History:  Social History     Socioeconomic History    Marital status:    Tobacco  Use    Smoking status: Former Smoker     Quit date: 1975     Years since quittin.0    Smokeless tobacco: Never Used   Substance and Sexual Activity    Alcohol use: No    Drug use: No    Sexual activity: Never       Medications:  Current Outpatient Medications on File Prior to Visit   Medication Sig Dispense Refill    estradioL (ESTRACE) 0.01 % (0.1 mg/gram) vaginal cream PLACE 1 GRAM (/ APPLICATORFUL = 1GRAM) VAGINALLY TWICE A WEEK (DX. STRESS INCONTINENCE WITH ATROPHIC VAGINITIS) 1 each 1    levothyroxine (SYNTHROID) 125 MCG tablet TAKE 1 TABLET EVERY DAY 90 tablet 3    LORazepam (ATIVAN) 1 MG tablet Take 1 tablet (1 mg total) by mouth every evening. 90 tablet 0    magnesium oxide 500 mg Tab Take 1 tablet by mouth once daily.      propranoloL (INDERAL) 20 MG tablet TAKE 1 TABLET TWICE DAILY 180 tablet 1    temazepam (RESTORIL) 30 mg capsule TAKE 1 CAPSULE BY MOUTH ONCE DAILY AT NIGHT AS NEEDED FOR INSOMNIA 30 capsule 3    terconazole (TERAZOL 7) 0.4 % Crea Place 1 applicator vaginally every evening. 45 g 1    venlafaxine (EFFEXOR-XR) 75 MG 24 hr capsule TAKE 1 CAPSULE EVERY DAY 90 capsule 2    VIT C/E/ZN/COPPR/LUTEIN/ZEAXAN (PRESERVISION AREDS 2 ORAL) Take by mouth.      nabumetone (RELAFEN) 500 MG tablet       [DISCONTINUED] hydroCHLOROthiazide (HYDRODIURIL) 12.5 MG Tab TAKE 1 TABLET EVERY DAY 90 tablet 2    [DISCONTINUED] naproxen (NAPROSYN) 500 MG tablet Take 1 tablet (500 mg total) by mouth 2 (two) times daily as needed. 30 tablet 2    [DISCONTINUED] tolterodine (DETROL LA) 2 MG Cp24 TAKE 1 CAPSULE (2 MG TOTAL) BY MOUTH ONCE DAILY. 90 capsule 3     No current facility-administered medications on file prior to visit.       Allergies:  Sulfa (sulfonamide antibiotics)    Immunizations:  Immunization History   Administered Date(s) Administered    COVID-19, MRNA, LN-S, PF (Pfizer) (Purple Cap) 01/10/2021, 2021, 2021    H1N1 Swine Flu Vaccine 2009    Influenza  "10/10/2007, 09/24/2008, 09/29/2010    Influenza - High Dose - PF (65 years and older) 09/21/2011, 10/02/2012, 10/02/2012, 10/07/2013, 10/07/2013, 09/29/2014, 09/29/2014, 09/28/2015, 09/28/2015, 09/20/2016, 10/03/2017, 10/08/2018, 09/20/2019, 10/15/2020    Influenza - Quadrivalent 10/20/2020    Influenza - Quadrivalent - High Dose - PF (65 years and older) 10/13/2021    Influenza - Trivalent (ADULT) 10/10/2007, 09/24/2008, 09/20/2009, 09/29/2010    Influenza A (H1N1) 2009 Monovalent - IM 12/07/2009    Influenza Split 09/20/2009    Pneumococcal Conjugate - 13 Valent 09/28/2015    Pneumococcal Polysaccharide - 23 Valent 11/08/2000    Tdap 01/20/2017    Zoster 07/30/2007    Zoster Recombinant 09/04/2020, 09/21/2020, 11/17/2020, 11/23/2020       Review of Systems:  Review of Systems   Constitutional: Negative for activity change and appetite change.   HENT: Negative for congestion and trouble swallowing.    Respiratory: Negative for chest tightness and shortness of breath.    Cardiovascular: Negative for chest pain and leg swelling.   Neurological: Positive for dizziness and light-headedness.   Psychiatric/Behavioral: Positive for sleep disturbance. Negative for dysphoric mood. The patient is nervous/anxious.        Objective:    Vitals:  Vitals:    05/18/22 1056   BP: 128/62   Pulse: 74   Resp: 18   SpO2: 97%   Weight: 69.9 kg (154 lb 1.6 oz)   Height: 5' 2" (1.575 m)   PainSc: 0-No pain       Physical Exam  Vitals reviewed.   Constitutional:       General: She is not in acute distress.     Appearance: She is well-developed.   Eyes:      General: No scleral icterus.  Cardiovascular:      Rate and Rhythm: Normal rate and regular rhythm.   Pulmonary:      Effort: Pulmonary effort is normal. No respiratory distress.      Breath sounds: Normal breath sounds. No wheezing, rhonchi or rales.   Musculoskeletal:      Right lower leg: No edema.      Left lower leg: No edema.   Skin:     General: Skin is warm and dry. "   Neurological:      Mental Status: She is alert and oriented to person, place, and time. Mental status is at baseline.   Psychiatric:         Behavior: Behavior normal.         Body mass index is 28.19 kg/m².      Data:  LDL 99  HDL 40    Zoë Garcia MD  Internal Medicine

## 2022-05-19 PROCEDURE — G0180 MD CERTIFICATION HHA PATIENT: HCPCS | Mod: ,,, | Performed by: INTERNAL MEDICINE

## 2022-05-19 PROCEDURE — G0180 PR HOME HEALTH MD CERTIFICATION: ICD-10-PCS | Mod: ,,, | Performed by: INTERNAL MEDICINE

## 2022-05-23 RX ORDER — TEMAZEPAM 30 MG/1
CAPSULE ORAL
Qty: 30 CAPSULE | Refills: 0 | OUTPATIENT
Start: 2022-05-23

## 2022-05-25 ENCOUNTER — TELEPHONE (OUTPATIENT)
Dept: FAMILY MEDICINE | Facility: CLINIC | Age: 87
End: 2022-05-25
Payer: MEDICARE

## 2022-05-25 NOTE — TELEPHONE ENCOUNTER
----- Message from Sridevi Garcia sent at 5/25/2022 12:57 PM CDT -----  Contact: Home Health Nurse  Type: Patient Call Back         Who called: Sami King - Home Health Nurse         What is the request in detail:states niece had called for patient; bp was elevated; not complaining of headaches or tightness in chest; complaining of dizziness when bending over and getting up too fast but is okay now; state she was nauseated but took her medicine and had only eaten an apple pie; states she is doing fine now; niece is still there and physical therapy was just pulling when nurse left; please advise              Best call back number: [phone] please call after 1 hr         Additional Information:            Thank You

## 2022-05-25 NOTE — TELEPHONE ENCOUNTER
----- Message from Li Martinez sent at 5/25/2022 10:37 AM CDT -----  Patient Call Back    Who Called: Francine/[facility]    What is the request in detail: Francine calling to speak with someone regarding the pt care. The pt told Francine when she bends over she begins to get dizzy. The pt stated that her blood pressure is elevated and does have an in home blood pressure cuff. Farncine advised the pt to go to the ER, but she just wants to know what else need to be done with the pt. Pls advise    Can the clinic reply by [facility]?    Best Call Back Number:   [phone]

## 2022-06-15 ENCOUNTER — OFFICE VISIT (OUTPATIENT)
Dept: FAMILY MEDICINE | Facility: CLINIC | Age: 87
End: 2022-06-15
Payer: MEDICARE

## 2022-06-15 VITALS
WEIGHT: 157.06 LBS | RESPIRATION RATE: 18 BRPM | OXYGEN SATURATION: 96 % | BODY MASS INDEX: 28.9 KG/M2 | DIASTOLIC BLOOD PRESSURE: 68 MMHG | SYSTOLIC BLOOD PRESSURE: 174 MMHG | HEIGHT: 62 IN | HEART RATE: 72 BPM

## 2022-06-15 DIAGNOSIS — R05.9 COUGH: Primary | ICD-10-CM

## 2022-06-15 DIAGNOSIS — G47.01 INSOMNIA DUE TO MEDICAL CONDITION: ICD-10-CM

## 2022-06-15 DIAGNOSIS — D64.9 ANEMIA, UNSPECIFIED TYPE: ICD-10-CM

## 2022-06-15 DIAGNOSIS — I10 PRIMARY HYPERTENSION: ICD-10-CM

## 2022-06-15 DIAGNOSIS — Z13.6 ENCOUNTER FOR SCREENING FOR CARDIOVASCULAR DISORDERS: ICD-10-CM

## 2022-06-15 PROCEDURE — 1126F PR PAIN SEVERITY QUANTIFIED, NO PAIN PRESENT: ICD-10-PCS | Mod: CPTII,S$GLB,, | Performed by: INTERNAL MEDICINE

## 2022-06-15 PROCEDURE — 99214 OFFICE O/P EST MOD 30 MIN: CPT | Mod: S$GLB,,, | Performed by: INTERNAL MEDICINE

## 2022-06-15 PROCEDURE — 1160F RVW MEDS BY RX/DR IN RCRD: CPT | Mod: CPTII,S$GLB,, | Performed by: INTERNAL MEDICINE

## 2022-06-15 PROCEDURE — 1126F AMNT PAIN NOTED NONE PRSNT: CPT | Mod: CPTII,S$GLB,, | Performed by: INTERNAL MEDICINE

## 2022-06-15 PROCEDURE — 99214 PR OFFICE/OUTPT VISIT, EST, LEVL IV, 30-39 MIN: ICD-10-PCS | Mod: S$GLB,,, | Performed by: INTERNAL MEDICINE

## 2022-06-15 PROCEDURE — 1101F PR PT FALLS ASSESS DOC 0-1 FALLS W/OUT INJ PAST YR: ICD-10-PCS | Mod: CPTII,S$GLB,, | Performed by: INTERNAL MEDICINE

## 2022-06-15 PROCEDURE — 1101F PT FALLS ASSESS-DOCD LE1/YR: CPT | Mod: CPTII,S$GLB,, | Performed by: INTERNAL MEDICINE

## 2022-06-15 PROCEDURE — 3288F FALL RISK ASSESSMENT DOCD: CPT | Mod: CPTII,S$GLB,, | Performed by: INTERNAL MEDICINE

## 2022-06-15 PROCEDURE — 3288F PR FALLS RISK ASSESSMENT DOCUMENTED: ICD-10-PCS | Mod: CPTII,S$GLB,, | Performed by: INTERNAL MEDICINE

## 2022-06-15 PROCEDURE — 99999 PR PBB SHADOW E&M-EST. PATIENT-LVL V: ICD-10-PCS | Mod: PBBFAC,,, | Performed by: INTERNAL MEDICINE

## 2022-06-15 PROCEDURE — 99999 PR PBB SHADOW E&M-EST. PATIENT-LVL V: CPT | Mod: PBBFAC,,, | Performed by: INTERNAL MEDICINE

## 2022-06-15 PROCEDURE — 1160F PR REVIEW ALL MEDS BY PRESCRIBER/CLIN PHARMACIST DOCUMENTED: ICD-10-PCS | Mod: CPTII,S$GLB,, | Performed by: INTERNAL MEDICINE

## 2022-06-15 PROCEDURE — 1159F MED LIST DOCD IN RCRD: CPT | Mod: CPTII,S$GLB,, | Performed by: INTERNAL MEDICINE

## 2022-06-15 PROCEDURE — 1159F PR MEDICATION LIST DOCUMENTED IN MEDICAL RECORD: ICD-10-PCS | Mod: CPTII,S$GLB,, | Performed by: INTERNAL MEDICINE

## 2022-06-15 RX ORDER — MULTIVIT WITH MINERALS/HERBS
1 TABLET ORAL DAILY
COMMUNITY

## 2022-06-15 RX ORDER — HYDROCHLOROTHIAZIDE 12.5 MG/1
12.5 TABLET ORAL DAILY
Qty: 30 TABLET | Refills: 1 | Status: SHIPPED | OUTPATIENT
Start: 2022-06-15 | End: 2022-07-20 | Stop reason: SDUPTHER

## 2022-06-15 RX ORDER — TEMAZEPAM 7.5 MG/1
7.5 CAPSULE ORAL NIGHTLY PRN
Qty: 30 CAPSULE | Refills: 0 | Status: SHIPPED | OUTPATIENT
Start: 2022-06-15 | End: 2022-07-12

## 2022-06-15 RX ORDER — LORAZEPAM 0.5 MG/1
TABLET ORAL
COMMUNITY
Start: 2022-02-23 | End: 2022-06-15 | Stop reason: DRUGHIGH

## 2022-06-15 NOTE — PROGRESS NOTES
"Assessment and Plan:    1. Cough  Discussed possible non-COVID virus vs allergies. Symptoms fairly minimal, will monitor.  - POCT COVID-19 Rapid Screening    2. Primary hypertension  Restart HCTZ, continue home monitoring, follow up 1 month.  - hydroCHLOROthiazide (HYDRODIURIL) 12.5 MG Tab; Take 1 tablet (12.5 mg total) by mouth once daily.  Dispense: 30 tablet; Refill: 1  - Comprehensive Metabolic Panel; Future    3. Insomnia due to medical condition  Will try decreasing further to 7.5 mg. Discussed [employer] prices for this.  - temazepam (RESTORIL) 7.5 MG Cap; Take 1 capsule (7.5 mg total) by mouth nightly as needed (insomnia).  Dispense: 30 capsule; Refill: 0    4. Anemia, unspecified type  - CBC Auto Differential; Future    5. Encounter for screening for cardiovascular disorders  - Lipid Panel; Future      ______________________________________________________________________  Subjective:    Chief Complaint:  Follow up    HPI:  Bhakti is a 87 y.o. year old female here for follow up.     She reports that since stopping the HCTZ, she has not noticed any improvement in the "feeling off." Still feels similarly. BP has been getting higher.     She reports that with decreasing the temazepam to 15 mg from 30 she has not had any worsening in her ability to sleep, however also did not notice any improvement in the way she is feeling with this either.     She does report that she has been having cough and nasal congestion for the past 3 days. No fever or chills, no SOB or wheezing, no GI symptoms.       Medications:  Current Outpatient Medications on File Prior to Visit   Medication Sig Dispense Refill    b complex vitamins tablet Take 1 tablet by mouth once daily.      bisacodyL 5 mg Tab 1-3 tablet DAILY (route: oral)      cholecalciferol, vitamin D3, (VITAMIN D3) 100 mcg (4,000 unit) Cap capsule Take by mouth once daily.      estradioL (ESTRACE) 0.01 % (0.1 mg/gram) vaginal cream PLACE 1 GRAM (1/4 APPLICATORFUL " "= 1GRAM) VAGINALLY TWICE A WEEK (DX. STRESS INCONTINENCE WITH ATROPHIC VAGINITIS) 1 each 1    levothyroxine (SYNTHROID) 125 MCG tablet TAKE 1 TABLET EVERY DAY 90 tablet 3    LORazepam (ATIVAN) 1 MG tablet Take 1 tablet (1 mg total) by mouth every evening. 90 tablet 0    magnesium oxide 500 mg Tab Take 1 tablet by mouth once daily.      nabumetone (RELAFEN) 500 MG tablet       propranoloL (INDERAL) 20 MG tablet TAKE 1 TABLET TWICE DAILY 180 tablet 1    terconazole (TERAZOL 7) 0.4 % Crea Place 1 applicator vaginally every evening. 45 g 1    venlafaxine (EFFEXOR-XR) 75 MG 24 hr capsule TAKE 1 CAPSULE EVERY DAY 90 capsule 2    VIT C/E/ZN/COPPR/LUTEIN/ZEAXAN (PRESERVISION AREDS 2 ORAL) Take by mouth.       No current facility-administered medications on file prior to visit.       Review of Systems:  Review of Systems   Constitutional: Negative for chills and fever.   HENT: Positive for congestion.    Respiratory: Positive for cough. Negative for shortness of breath and wheezing.    Gastrointestinal: Negative for diarrhea, nausea and vomiting.   Allergic/Immunologic: Positive for environmental allergies.   Neurological: Positive for dizziness.       Past Medical History:  Past Medical History:   Diagnosis Date    Anxiety     Arthritis     History of melanoma     at age of 26    HTN (hypertension) 5/21/2012    Hypertension     Hypothyroid 5/21/2012    Hypothyroidism     Insomnia 2/25/2014    Melanoma     age 26    Osteoarthritis 5/21/2012    Tremor        Objective:    Vitals:  Vitals:    06/15/22 1107 06/15/22 1229   BP: (!) 176/74 (!) 174/68   Pulse: 72    Resp: 18    SpO2: 96%    Weight: 71.2 kg (157 lb 1.2 oz)    Height: 5' 2" (1.575 m)    PainSc: 0-No pain        Physical Exam  Vitals reviewed.   Constitutional:       General: She is not in acute distress.     Appearance: She is well-developed.   Eyes:      General: No scleral icterus.  Cardiovascular:      Rate and Rhythm: Normal rate and regular " rhythm.      Heart sounds: No murmur heard.  Pulmonary:      Effort: Pulmonary effort is normal. No respiratory distress.      Breath sounds: Normal breath sounds. No wheezing, rhonchi or rales.   Skin:     General: Skin is warm and dry.   Neurological:      Mental Status: She is alert and oriented to person, place, and time.   Psychiatric:         Behavior: Behavior normal.         Data:  COVID negative    Zoë Garcia MD  Internal Medicine

## 2022-06-29 ENCOUNTER — EXTERNAL HOME HEALTH (OUTPATIENT)
Dept: HOME HEALTH SERVICES | Facility: HOSPITAL | Age: 87
End: 2022-06-29
Payer: MEDICARE

## 2022-07-13 ENCOUNTER — LAB VISIT (OUTPATIENT)
Dept: LAB | Facility: HOSPITAL | Age: 87
End: 2022-07-13
Attending: INTERNAL MEDICINE
Payer: MEDICARE

## 2022-07-13 DIAGNOSIS — Z13.6 ENCOUNTER FOR SCREENING FOR CARDIOVASCULAR DISORDERS: ICD-10-CM

## 2022-07-13 DIAGNOSIS — I10 PRIMARY HYPERTENSION: ICD-10-CM

## 2022-07-13 DIAGNOSIS — D64.9 ANEMIA, UNSPECIFIED TYPE: ICD-10-CM

## 2022-07-13 LAB
ALBUMIN SERPL BCP-MCNC: 4.1 G/DL (ref 3.5–5.2)
ALP SERPL-CCNC: 75 U/L (ref 55–135)
ALT SERPL W/O P-5'-P-CCNC: 15 U/L (ref 10–44)
ANION GAP SERPL CALC-SCNC: 11 MMOL/L (ref 8–16)
AST SERPL-CCNC: 15 U/L (ref 10–40)
BASOPHILS # BLD AUTO: 0.11 K/UL (ref 0–0.2)
BASOPHILS NFR BLD: 1.1 % (ref 0–1.9)
BILIRUB SERPL-MCNC: 0.3 MG/DL (ref 0.1–1)
BUN SERPL-MCNC: 26 MG/DL (ref 8–23)
CALCIUM SERPL-MCNC: 10 MG/DL (ref 8.7–10.5)
CHLORIDE SERPL-SCNC: 105 MMOL/L (ref 95–110)
CHOLEST SERPL-MCNC: 214 MG/DL (ref 120–199)
CHOLEST/HDLC SERPL: 5.2 {RATIO} (ref 2–5)
CO2 SERPL-SCNC: 25 MMOL/L (ref 23–29)
CREAT SERPL-MCNC: 0.9 MG/DL (ref 0.5–1.4)
DIFFERENTIAL METHOD: ABNORMAL
EOSINOPHIL # BLD AUTO: 0.4 K/UL (ref 0–0.5)
EOSINOPHIL NFR BLD: 3.8 % (ref 0–8)
ERYTHROCYTE [DISTWIDTH] IN BLOOD BY AUTOMATED COUNT: 14.9 % (ref 11.5–14.5)
EST. GFR  (AFRICAN AMERICAN): >60 ML/MIN/1.73 M^2
EST. GFR  (NON AFRICAN AMERICAN): 57.3 ML/MIN/1.73 M^2
GLUCOSE SERPL-MCNC: 91 MG/DL (ref 70–110)
HCT VFR BLD AUTO: 38.7 % (ref 37–48.5)
HDLC SERPL-MCNC: 41 MG/DL (ref 40–75)
HDLC SERPL: 19.2 % (ref 20–50)
HGB BLD-MCNC: 11.9 G/DL (ref 12–16)
IMM GRANULOCYTES # BLD AUTO: 0.03 K/UL (ref 0–0.04)
IMM GRANULOCYTES NFR BLD AUTO: 0.3 % (ref 0–0.5)
LDLC SERPL CALC-MCNC: 126.6 MG/DL (ref 63–159)
LYMPHOCYTES # BLD AUTO: 2.1 K/UL (ref 1–4.8)
LYMPHOCYTES NFR BLD: 20.8 % (ref 18–48)
MCH RBC QN AUTO: 30.1 PG (ref 27–31)
MCHC RBC AUTO-ENTMCNC: 30.7 G/DL (ref 32–36)
MCV RBC AUTO: 98 FL (ref 82–98)
MONOCYTES # BLD AUTO: 0.8 K/UL (ref 0.3–1)
MONOCYTES NFR BLD: 7.8 % (ref 4–15)
NEUTROPHILS # BLD AUTO: 6.5 K/UL (ref 1.8–7.7)
NEUTROPHILS NFR BLD: 66.2 % (ref 38–73)
NONHDLC SERPL-MCNC: 173 MG/DL
NRBC BLD-RTO: 0 /100 WBC
PLATELET # BLD AUTO: 413 K/UL (ref 150–450)
PMV BLD AUTO: 11.3 FL (ref 9.2–12.9)
POTASSIUM SERPL-SCNC: 4.3 MMOL/L (ref 3.5–5.1)
PROT SERPL-MCNC: 7.6 G/DL (ref 6–8.4)
RBC # BLD AUTO: 3.96 M/UL (ref 4–5.4)
SODIUM SERPL-SCNC: 141 MMOL/L (ref 136–145)
TRIGL SERPL-MCNC: 232 MG/DL (ref 30–150)
WBC # BLD AUTO: 9.86 K/UL (ref 3.9–12.7)

## 2022-07-13 PROCEDURE — 80061 LIPID PANEL: CPT | Performed by: INTERNAL MEDICINE

## 2022-07-13 PROCEDURE — 36415 COLL VENOUS BLD VENIPUNCTURE: CPT | Mod: PN | Performed by: INTERNAL MEDICINE

## 2022-07-13 PROCEDURE — 85025 COMPLETE CBC W/AUTO DIFF WBC: CPT | Performed by: INTERNAL MEDICINE

## 2022-07-13 PROCEDURE — 80053 COMPREHEN METABOLIC PANEL: CPT | Performed by: INTERNAL MEDICINE

## 2022-07-18 ENCOUNTER — IMMUNIZATION (OUTPATIENT)
Dept: FAMILY MEDICINE | Facility: CLINIC | Age: 87
End: 2022-07-18
Payer: MEDICARE

## 2022-07-18 DIAGNOSIS — Z23 NEED FOR VACCINATION: Primary | ICD-10-CM

## 2022-07-18 PROCEDURE — 0054A COVID-19, MRNA, LNP-S, PF, 30 MCG/0.3 ML DOSE VACCINE (PFIZER): CPT | Mod: PBBFAC | Performed by: RADIOLOGY

## 2022-07-20 ENCOUNTER — TELEPHONE (OUTPATIENT)
Dept: FAMILY MEDICINE | Facility: CLINIC | Age: 87
End: 2022-07-20

## 2022-07-20 ENCOUNTER — OFFICE VISIT (OUTPATIENT)
Dept: FAMILY MEDICINE | Facility: CLINIC | Age: 87
End: 2022-07-20
Payer: MEDICARE

## 2022-07-20 VITALS
WEIGHT: 153.31 LBS | SYSTOLIC BLOOD PRESSURE: 136 MMHG | RESPIRATION RATE: 19 BRPM | BODY MASS INDEX: 28.21 KG/M2 | HEIGHT: 62 IN | DIASTOLIC BLOOD PRESSURE: 56 MMHG | HEART RATE: 72 BPM | OXYGEN SATURATION: 96 %

## 2022-07-20 DIAGNOSIS — E03.9 ACQUIRED HYPOTHYROIDISM: Primary | ICD-10-CM

## 2022-07-20 DIAGNOSIS — M54.41 CHRONIC BILATERAL LOW BACK PAIN WITH BILATERAL SCIATICA: Primary | ICD-10-CM

## 2022-07-20 DIAGNOSIS — G47.01 INSOMNIA DUE TO MEDICAL CONDITION: ICD-10-CM

## 2022-07-20 DIAGNOSIS — M54.42 CHRONIC BILATERAL LOW BACK PAIN WITH BILATERAL SCIATICA: Primary | ICD-10-CM

## 2022-07-20 DIAGNOSIS — I10 PRIMARY HYPERTENSION: ICD-10-CM

## 2022-07-20 DIAGNOSIS — G89.29 CHRONIC BILATERAL LOW BACK PAIN WITH BILATERAL SCIATICA: Primary | ICD-10-CM

## 2022-07-20 PROCEDURE — 1159F PR MEDICATION LIST DOCUMENTED IN MEDICAL RECORD: ICD-10-PCS | Mod: CPTII,S$GLB,, | Performed by: INTERNAL MEDICINE

## 2022-07-20 PROCEDURE — 99214 OFFICE O/P EST MOD 30 MIN: CPT | Mod: S$GLB,,, | Performed by: INTERNAL MEDICINE

## 2022-07-20 PROCEDURE — 1159F MED LIST DOCD IN RCRD: CPT | Mod: CPTII,S$GLB,, | Performed by: INTERNAL MEDICINE

## 2022-07-20 PROCEDURE — 1101F PR PT FALLS ASSESS DOC 0-1 FALLS W/OUT INJ PAST YR: ICD-10-PCS | Mod: CPTII,S$GLB,, | Performed by: INTERNAL MEDICINE

## 2022-07-20 PROCEDURE — 99999 PR PBB SHADOW E&M-EST. PATIENT-LVL V: CPT | Mod: PBBFAC,,, | Performed by: INTERNAL MEDICINE

## 2022-07-20 PROCEDURE — 1160F RVW MEDS BY RX/DR IN RCRD: CPT | Mod: CPTII,S$GLB,, | Performed by: INTERNAL MEDICINE

## 2022-07-20 PROCEDURE — 3288F FALL RISK ASSESSMENT DOCD: CPT | Mod: CPTII,S$GLB,, | Performed by: INTERNAL MEDICINE

## 2022-07-20 PROCEDURE — 1160F PR REVIEW ALL MEDS BY PRESCRIBER/CLIN PHARMACIST DOCUMENTED: ICD-10-PCS | Mod: CPTII,S$GLB,, | Performed by: INTERNAL MEDICINE

## 2022-07-20 PROCEDURE — 1125F AMNT PAIN NOTED PAIN PRSNT: CPT | Mod: CPTII,S$GLB,, | Performed by: INTERNAL MEDICINE

## 2022-07-20 PROCEDURE — 99999 PR PBB SHADOW E&M-EST. PATIENT-LVL V: ICD-10-PCS | Mod: PBBFAC,,, | Performed by: INTERNAL MEDICINE

## 2022-07-20 PROCEDURE — 3288F PR FALLS RISK ASSESSMENT DOCUMENTED: ICD-10-PCS | Mod: CPTII,S$GLB,, | Performed by: INTERNAL MEDICINE

## 2022-07-20 PROCEDURE — 99214 PR OFFICE/OUTPT VISIT, EST, LEVL IV, 30-39 MIN: ICD-10-PCS | Mod: S$GLB,,, | Performed by: INTERNAL MEDICINE

## 2022-07-20 PROCEDURE — 1125F PR PAIN SEVERITY QUANTIFIED, PAIN PRESENT: ICD-10-PCS | Mod: CPTII,S$GLB,, | Performed by: INTERNAL MEDICINE

## 2022-07-20 PROCEDURE — 1101F PT FALLS ASSESS-DOCD LE1/YR: CPT | Mod: CPTII,S$GLB,, | Performed by: INTERNAL MEDICINE

## 2022-07-20 RX ORDER — NAPROXEN 500 MG/1
TABLET ORAL
COMMUNITY
Start: 2022-07-01 | End: 2022-07-20

## 2022-07-20 RX ORDER — TEMAZEPAM 7.5 MG/1
7.5 CAPSULE ORAL NIGHTLY PRN
Qty: 90 CAPSULE | Refills: 0 | Status: SHIPPED | OUTPATIENT
Start: 2022-08-11 | End: 2022-10-19 | Stop reason: SDUPTHER

## 2022-07-20 RX ORDER — HYDROCHLOROTHIAZIDE 12.5 MG/1
12.5 TABLET ORAL DAILY
Qty: 90 TABLET | Refills: 1 | Status: SHIPPED | OUTPATIENT
Start: 2022-07-20 | End: 2022-12-14

## 2022-07-20 NOTE — PROGRESS NOTES
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 No side effects. Will continue. Can see if better covered filled through "[employer]. Follow up in 3 months.  - temazepam (RESTORIL) 7.5 MG Cap; Take 1 capsule (7.5 mg total) by mouth nightly as needed (Insomnia).  Dispense: 90 capsule; Refill: 0    2. Primary hypertension  Improved back on medication, will continue.  - hydroCHLOROthiazide (HYDRODIURIL) 12.5 MG Tab; Take 1 tablet (12.5 mg total) by mouth once daily.  Dispense: 90 tablet; Refill: 1  - Basic Metabolic Panel; Future    3. Acquired hypothyroidism  - TSH; Future    Labs with next visit.  ______________________________________________________________________  Subjective:    Chief Complaint:  Follow up chronic medical conditions.    HPI:  Bhakti is a 88 y.o. year old female here to follow up chronic medical conditions.     HTN- Restarted HCTZ last visit as BP had been getting higher with her off of this. Also on propranolol 20 mg BID.    Insomnia- Last visit had discussed trying to decrease temazepam to 7.5 mg daily. She was able to fill this 6/15 and 7/12 for 30 days. She reports that she is doing really well with this. Even sleeping better than when she was on a higher dose. She reports that the cost is slightly more, wanting to know if this could be filled through "[employer].     Hypothyroidism- Taking levothyroxine 125 mcg daily.     Cholesterol is higher than before.    Medications:  Current Outpatient Medications on File Prior to Visit   Medication Sig Dispense Refill    b complex vitamins tablet Take 1 tablet by mouth once daily.      bisacodyL 5 mg Tab 1-3 tablet DAILY (route: oral)      cholecalciferol, vitamin D3, 100 mcg (4,000 unit) Cap capsule Take by mouth once daily.      estradioL (ESTRACE) 0.01 % (0.1 mg/gram) vaginal cream PLACE 1 GRAM (1/4 APPLICATORFUL = 1GRAM) VAGINALLY TWICE A WEEK (DX. STRESS INCONTINENCE WITH ATROPHIC VAGINITIS) 1 each 1     "levothyroxine (SYNTHROID) 125 MCG tablet TAKE 1 TABLET EVERY DAY 90 tablet 3    magnesium oxide 500 mg Tab Take 1 tablet by mouth once daily.      nabumetone (RELAFEN) 500 MG tablet       propranoloL (INDERAL) 20 MG tablet TAKE 1 TABLET TWICE DAILY 180 tablet 1    terconazole (TERAZOL 7) 0.4 % Crea Place 1 applicator vaginally every evening. 45 g 1    venlafaxine (EFFEXOR-XR) 75 MG 24 hr capsule TAKE 1 CAPSULE EVERY DAY 90 capsule 2    VIT C/E/ZN/COPPR/LUTEIN/ZEAXAN (PRESERVISION AREDS 2 ORAL) Take by mouth.      [DISCONTINUED] hydroCHLOROthiazide (HYDRODIURIL) 12.5 MG Tab Take 1 tablet (12.5 mg total) by mouth once daily. 30 tablet 1    [DISCONTINUED] LORazepam (ATIVAN) 1 MG tablet Take 1 tablet (1 mg total) by mouth every evening. 90 tablet 0    [DISCONTINUED] naproxen (NAPROSYN) 500 MG tablet       [DISCONTINUED] temazepam (RESTORIL) 7.5 MG Cap TAKE 1 CAPSULE BY MOUTH NIGHTLY AS NEEDED FOR INSOMNIA 30 capsule 0     No current facility-administered medications on file prior to visit.       Review of Systems:  Review of Systems   Constitutional: Negative for chills and fever.   Respiratory: Negative for shortness of breath and wheezing.    Neurological: Negative for dizziness and light-headedness.   Psychiatric/Behavioral: Negative for sleep disturbance.       Past Medical History:  Past Medical History:   Diagnosis Date    Anxiety     Arthritis     History of melanoma     at age of 26    HTN (hypertension) 5/21/2012    Hypertension     Hypothyroid 5/21/2012    Hypothyroidism     Insomnia 2/25/2014    Melanoma     age 26    Osteoarthritis 5/21/2012    Tremor        Objective:    Vitals:  Vitals:    07/20/22 1332 07/20/22 1409   BP: (!) 142/58 (!) 136/56   Pulse: 72    Resp: 19    SpO2: 96%    Weight: 69.6 kg (153 lb 5.3 oz)    Height: 5' 2" (1.575 m)    PainSc:   5    PainLoc: Back        Physical Exam  Vitals reviewed.   Constitutional:       General: She is not in acute distress.     " Appearance: She is well-developed.   Eyes:      General:         Right eye: No discharge.         Left eye: No discharge.      Conjunctiva/sclera: Conjunctivae normal.   Cardiovascular:      Rate and Rhythm: Normal rate and regular rhythm.   Pulmonary:      Effort: Pulmonary effort is normal. No respiratory distress.   Skin:     General: Skin is warm and dry.   Neurological:      Mental Status: She is alert and oriented to person, place, and time.   Psychiatric:         Behavior: Behavior normal.         Thought Content: Thought content normal.         Judgment: Judgment normal.         Data:  LDL higher than before but still acceptable.     Zoë Garcia MD  Internal Medicine

## 2022-07-21 NOTE — TELEPHONE ENCOUNTER
Spoke w/ Courtney . Advised this has been done. Confirmed they have the order and will work on this.

## 2022-07-22 ENCOUNTER — TELEPHONE (OUTPATIENT)
Dept: FAMILY MEDICINE | Facility: CLINIC | Age: 87
End: 2022-07-22
Payer: MEDICARE

## 2022-07-22 NOTE — TELEPHONE ENCOUNTER
Called the  nurse Natalie back. She just had to inform us that pt is getting a new a/c due to hers being out and wants to start on monday

## 2022-07-22 NOTE — TELEPHONE ENCOUNTER
----- Message from Annmarie Sanchezry sent at 7/22/2022  4:22 PM CDT -----  .Type:  Patient Call Back    Who Called: PT HOME HEALTH NURSE VICKY       Does the patient know what this is regarding?: PT IS REQUESTING HOME HEALTH START ON 7/25/2022 SHE WANTED TO LEFT THE OFFICE KNOW.     Would the patient rather a call back YES     Best Call Back Number: [phone]    Additional Information: Thank You

## 2022-07-25 ENCOUNTER — TELEPHONE (OUTPATIENT)
Dept: FAMILY MEDICINE | Facility: CLINIC | Age: 87
End: 2022-07-25
Payer: MEDICARE

## 2022-07-25 DIAGNOSIS — M54.42 CHRONIC BILATERAL LOW BACK PAIN WITH BILATERAL SCIATICA: Primary | ICD-10-CM

## 2022-07-25 DIAGNOSIS — M54.41 CHRONIC BILATERAL LOW BACK PAIN WITH BILATERAL SCIATICA: Primary | ICD-10-CM

## 2022-07-25 DIAGNOSIS — G89.29 CHRONIC BILATERAL LOW BACK PAIN WITH BILATERAL SCIATICA: Primary | ICD-10-CM

## 2022-07-25 NOTE — TELEPHONE ENCOUNTER
----- Message from Beatriz Lino, Patient Care Assistant sent at 7/25/2022 10:17 AM CDT -----  Contact: [facility]  Type: Needs Medical Advice    Who Called: [facility]  Best Call Back Number: [phone]  Inquiry/Question: PT is calling to advise the patient do not qualify for PT through [facility] due to not being homebound. PT is willing to refer to an Outpatient provider through [facility]. Please call back and advise. Thank you~

## 2022-07-25 NOTE — TELEPHONE ENCOUNTER
Tried to reach pt. No answer. Left message for pt that ref is in and OK to call [facility] to sched appt. P to call back PRN

## 2022-07-26 ENCOUNTER — DOCUMENT SCAN (OUTPATIENT)
Dept: HOME HEALTH SERVICES | Facility: HOSPITAL | Age: 87
End: 2022-07-26
Payer: MEDICARE

## 2022-08-08 ENCOUNTER — OFFICE VISIT (OUTPATIENT)
Dept: RHEUMATOLOGY | Facility: CLINIC | Age: 87
End: 2022-08-08
Payer: MEDICARE

## 2022-08-08 ENCOUNTER — HOSPITAL ENCOUNTER (OUTPATIENT)
Dept: RADIOLOGY | Facility: HOSPITAL | Age: 87
Discharge: HOME | End: 2022-08-08
Attending: INTERNAL MEDICINE
Payer: MEDICARE

## 2022-08-08 VITALS
BODY MASS INDEX: 27.75 KG/M2 | DIASTOLIC BLOOD PRESSURE: 60 MMHG | SYSTOLIC BLOOD PRESSURE: 131 MMHG | HEART RATE: 65 BPM | WEIGHT: 150.81 LBS | HEIGHT: 62 IN

## 2022-08-08 DIAGNOSIS — M13.0 POLYARTHRITIS: Primary | ICD-10-CM

## 2022-08-08 DIAGNOSIS — Z51.81 ENCOUNTER FOR MONITORING CHRONIC NSAID THERAPY: ICD-10-CM

## 2022-08-08 DIAGNOSIS — M13.0 POLYARTHRITIS: ICD-10-CM

## 2022-08-08 DIAGNOSIS — Z79.1 ENCOUNTER FOR MONITORING CHRONIC NSAID THERAPY: ICD-10-CM

## 2022-08-08 PROCEDURE — 1160F RVW MEDS BY RX/DR IN RCRD: CPT | Mod: CPTII,S$GLB,, | Performed by: INTERNAL MEDICINE

## 2022-08-08 PROCEDURE — 73130 X-RAY EXAM OF HAND: CPT | Mod: 26,50,, | Performed by: RADIOLOGY

## 2022-08-08 PROCEDURE — 1160F PR REVIEW ALL MEDS BY PRESCRIBER/CLIN PHARMACIST DOCUMENTED: ICD-10-PCS | Mod: CPTII,S$GLB,, | Performed by: INTERNAL MEDICINE

## 2022-08-08 PROCEDURE — 99999 PR PBB SHADOW E&M-EST. PATIENT-LVL IV: CPT | Mod: PBBFAC,,, | Performed by: INTERNAL MEDICINE

## 2022-08-08 PROCEDURE — 1159F MED LIST DOCD IN RCRD: CPT | Mod: CPTII,S$GLB,, | Performed by: INTERNAL MEDICINE

## 2022-08-08 PROCEDURE — 99205 OFFICE O/P NEW HI 60 MIN: CPT | Mod: S$GLB,,, | Performed by: INTERNAL MEDICINE

## 2022-08-08 PROCEDURE — 99205 PR OFFICE/OUTPT VISIT, NEW, LEVL V, 60-74 MIN: ICD-10-PCS | Mod: S$GLB,,, | Performed by: INTERNAL MEDICINE

## 2022-08-08 PROCEDURE — 1159F PR MEDICATION LIST DOCUMENTED IN MEDICAL RECORD: ICD-10-PCS | Mod: CPTII,S$GLB,, | Performed by: INTERNAL MEDICINE

## 2022-08-08 PROCEDURE — 1100F PTFALLS ASSESS-DOCD GE2>/YR: CPT | Mod: CPTII,S$GLB,, | Performed by: INTERNAL MEDICINE

## 2022-08-08 PROCEDURE — 73130 X-RAY EXAM OF HAND: CPT | Mod: TC,50,FY,PO

## 2022-08-08 PROCEDURE — 1125F PR PAIN SEVERITY QUANTIFIED, PAIN PRESENT: ICD-10-PCS | Mod: CPTII,S$GLB,, | Performed by: INTERNAL MEDICINE

## 2022-08-08 PROCEDURE — 99999 PR PBB SHADOW E&M-EST. PATIENT-LVL IV: ICD-10-PCS | Mod: PBBFAC,,, | Performed by: INTERNAL MEDICINE

## 2022-08-08 PROCEDURE — 3288F PR FALLS RISK ASSESSMENT DOCUMENTED: ICD-10-PCS | Mod: CPTII,S$GLB,, | Performed by: INTERNAL MEDICINE

## 2022-08-08 PROCEDURE — 1125F AMNT PAIN NOTED PAIN PRSNT: CPT | Mod: CPTII,S$GLB,, | Performed by: INTERNAL MEDICINE

## 2022-08-08 PROCEDURE — 1100F PR PT FALLS ASSESS DOC 2+ FALLS/FALL W/INJURY/YR: ICD-10-PCS | Mod: CPTII,S$GLB,, | Performed by: INTERNAL MEDICINE

## 2022-08-08 PROCEDURE — 73130 XR HAND COMPLETE 3 VIEWS BILATERAL: ICD-10-PCS | Mod: 26,50,, | Performed by: RADIOLOGY

## 2022-08-08 PROCEDURE — 3288F FALL RISK ASSESSMENT DOCD: CPT | Mod: CPTII,S$GLB,, | Performed by: INTERNAL MEDICINE

## 2022-08-08 NOTE — PATIENT INSTRUCTIONS
For now:  Labs and imaging  Decrease Relafen to 500mg ONCE daily   Use Tylenol 650mg- 1,000mg at night.

## 2022-08-08 NOTE — PROGRESS NOTES
Answers for HPI/ROS submitted by the patient on 8/5/2022  fever: No  eye redness: Yes  mouth sores: No  headaches: No  shortness of breath: No  chest pain: No  trouble swallowing: No  diarrhea: No  constipation: No  unexpected weight change: No  genital sore: No  dysuria: No  During the last 3 days, have you had a skin rash?: No  Bruises or bleeds easily: No  cough: No    Subjective:          Chief Complaint: Bhakti Phipps is a 88 y.o. female who had concerns including Disease Management.    HPI:    Patient is an 88-year-old female she was referred by her orthopedic surgeon Dr. Sukhdev Mello who is a hand specialist for evaluation of MCP joint arthritis concern for possible inflammatory arthritis.  Today there is no significant serologies that were sent.  She was given nonsteroidal anti-inflammatory (relafen) in the interim to this evaluation.  She also received injections within the 3rd MCP with [employer] own and bupivacaine 11/23/2021. Patient states the right 3rd MCP is first joint with severe swelling. She notes pain in various joints MCP and PIP mostly w/ left 1st IP deformed from injury with dog bite.     She denies morning stiffness, notes worse with use. No pain with rest and much better with Relafen 500mg BID. No Gi upset. Patient's last renal function shows a creatinine that is within normal limits and a GFR 57.   Patient notes left sided LBP, s/p left TKA (2006) this has done very well. She denies PUD, GI bleed or renal disease.     Imaging studies which I cannot review but were included in Dr. Mitchell's notes show AP lateral oblique images of the right hand with significant arthritic changes particularly noticed in the right MCP joints.  Denies  symptoms of Raynaud's, fever, chills , dry mouth, dry eyes, dysphagia, tight  skin, signs of pleurisy , pericarditis, rashes/photosensitivity, oral ulcers,  chest pain, shortness of breath, GI complaints/IBD,  urinary complaints or hx of proteinura/nephritis,   alopecia , fatigue, photosensitivity, headache, change in vision, face & jaw pain, ,psoriasis, numbness, anxiety, abdomen pain, nausea, vomiting. No hx of DVT, PE, miscarriages where applicable.    Patient with macular degeneration receives injections for this.     REVIEW OF SYSTEMS:    Review of Systems   Constitutional: Negative for fever.   Eyes: Positive for redness.   Respiratory: Negative for cough and shortness of breath.    Cardiovascular: Negative for chest pain.   Gastrointestinal: Negative for constipation and diarrhea.   Genitourinary: Negative for dysuria.   Musculoskeletal: Positive for back pain and joint pain.   Skin: Negative for rash.   Neurological: Negative for headaches.   Endo/Heme/Allergies: Does not bruise/bleed easily.               Objective:            Past Medical History:   Diagnosis Date    Anxiety     Arthritis     History of melanoma     at age of 26    HTN (hypertension) 2012    Hypertension     Hypothyroid 2012    Hypothyroidism     Insomnia 2014    Melanoma     age 26    Osteoarthritis 2012    Tremor      Family History   Problem Relation Age of Onset    Pancreatic cancer Mother     Leukemia Father     Lung cancer Brother     Liver cancer Sister      Social History     Tobacco Use    Smoking status: Former Smoker     Quit date: 1975     Years since quittin.2    Smokeless tobacco: Never Used   Substance Use Topics    Alcohol use: No    Drug use: No         Current Outpatient Medications on File Prior to Visit   Medication Sig Dispense Refill    b complex vitamins tablet Take 1 tablet by mouth once daily.      cholecalciferol, vitamin D3, 100 mcg (4,000 unit) Cap capsule Take by mouth once daily.      estradioL (ESTRACE) 0.01 % (0.1 mg/gram) vaginal cream PLACE 1 GRAM (1/ APPLICATORFUL = 1GRAM) VAGINALLY TWICE A WEEK (DX. STRESS INCONTINENCE WITH ATROPHIC VAGINITIS) 1 each 1    hydroCHLOROthiazide (HYDRODIURIL) 12.5 MG Tab Take 1  tablet (12.5 mg total) by mouth once daily. 90 tablet 1    levothyroxine (SYNTHROID) 125 MCG tablet TAKE 1 TABLET EVERY DAY 90 tablet 3    magnesium oxide 500 mg Tab Take 1 tablet by mouth once daily.      nabumetone (RELAFEN) 500 MG tablet       propranoloL (INDERAL) 20 MG tablet TAKE 1 TABLET TWICE DAILY 180 tablet 1    [START ON 8/11/2022] temazepam (RESTORIL) 7.5 MG Cap Take 1 capsule (7.5 mg total) by mouth nightly as needed (Insomnia). 90 capsule 0    venlafaxine (EFFEXOR-XR) 75 MG 24 hr capsule TAKE 1 CAPSULE EVERY DAY 90 capsule 2    VIT C/E/ZN/COPPR/LUTEIN/ZEAXAN (PRESERVISION AREDS 2 ORAL) Take by mouth.      bisacodyL 5 mg Tab 1-3 tablet DAILY (route: oral)      terconazole (TERAZOL 7) 0.4 % Crea Place 1 applicator vaginally every evening. (Patient not taking: Reported on 8/8/2022) 45 g 1     No current facility-administered medications on file prior to visit.       Vitals:    08/08/22 0921   BP: 131/60   Pulse: 65       Physical Exam:    Physical Exam  Constitutional:       Appearance: She is well-developed.   HENT:      Head: Normocephalic and atraumatic.   Eyes:      Pupils: Pupils are equal, round, and reactive to light.   Cardiovascular:      Rate and Rhythm: Normal rate and regular rhythm.      Heart sounds: Normal heart sounds.   Pulmonary:      Effort: Pulmonary effort is normal.      Breath sounds: Normal breath sounds.   Musculoskeletal:      Right shoulder: No swelling or tenderness. Normal range of motion.      Left shoulder: No swelling or tenderness. Normal range of motion.      Right elbow: No swelling. Normal range of motion. No tenderness.      Left elbow: No swelling. Normal range of motion. No tenderness.      Right wrist: No swelling or tenderness. Decreased range of motion.      Left wrist: No swelling or tenderness. Decreased range of motion.      Right hand: Deformity and tenderness present. No swelling. Decreased range of motion.      Left hand: Deformity and tenderness  present. No swelling. Decreased range of motion.      Cervical back: Normal range of motion.      Right knee: No swelling. Normal range of motion. No tenderness.      Left knee: No swelling. Normal range of motion. No tenderness.      Right foot: Normal range of motion. No swelling or tenderness.      Left foot: Normal range of motion. No swelling or tenderness.      Comments: Bony hypertrophy with some deformity of the PIP and DIP joints, no swelling or tenderness at the MCP joints.        Skin:     General: Skin is warm and dry.   Neurological:      Mental Status: She is alert and oriented to person, place, and time.   Psychiatric:         Behavior: Behavior normal.         Rapid3 Question Responses and Scores 8/5/2022   MDHAQ Score 1.4   Psychologic Score 2.2   Pain Score 4   When you awakened in the morning OVER THE LAST WEEK, did you feel stiff? Yes   If Yes, please indicate the number of hours until you are as limber as you will be for the day 3   Fatigue Score 4   Global Health Score 4   RAPID3 Score 4.22           Assessment:       Encounter Diagnoses   Name Primary?    Polyarthritis Yes    Encounter for monitoring chronic NSAID therapy           Plan:        Polyarthritis  -     Cyclic Citrullinated Peptide Antibody, IgG; Future; Expected date: 08/08/2022  -     Rheumatoid Factor; Future; Expected date: 08/08/2022  -     X-Ray Hand 3 View Bilateral; Future; Expected date: 08/08/2022  -     Sedimentation rate; Future; Expected date: 08/08/2022  -     C-Reactive Protein; Standing; Expected date: 08/08/2022    Encounter for monitoring chronic NSAID therapy  -     Cyclic Citrullinated Peptide Antibody, IgG; Future; Expected date: 08/08/2022  -     Rheumatoid Factor; Future; Expected date: 08/08/2022  -     X-Ray Hand 3 View Bilateral; Future; Expected date: 08/08/2022  -     Sedimentation rate; Future; Expected date: 08/08/2022  -     C-Reactive Protein; Standing; Expected date: 08/08/2022    Patient is an 88  y/o female with MCP, PIP and some DIP arthritis. She developed swelling of the MCP acutely and noted by hand surgeon to have significant MCP arthritis thusly referred to Rheumatology for eval of possible inflammatory arthritis.   No features of SLE, no Ps or IBD, will r/o RA     For now:  Labs and imaging  Decrease Relafen to 500mg ONCE daily   Use Tylenol 650mg- 1,000mg at night.   I want to see if she can reduce her NSAID daily burden.   Discussed r/b/se for NSAID including renal, GI and other ASE  Discussed nature of OA but will not make final diagnosis until we see imaging and labs.     Follow up in about 4 months (around 12/8/2022).  F/u 4 months  60min consultation with greater than 50% spent in counseling, chart review and coordination of care. All questions answered.  Thank you for allowing me to participate in the care of this very pleasant patient.

## 2022-08-10 ENCOUNTER — DOCUMENT SCAN (OUTPATIENT)
Dept: HOME HEALTH SERVICES | Facility: HOSPITAL | Age: 87
End: 2022-08-10
Payer: MEDICARE

## 2022-08-28 ENCOUNTER — PATIENT MESSAGE (OUTPATIENT)
Dept: ADMINISTRATIVE | Facility: HOSPITAL | Age: 87
End: 2022-08-28
Payer: MEDICARE

## 2022-09-07 ENCOUNTER — OFFICE VISIT (OUTPATIENT)
Dept: FAMILY MEDICINE | Facility: CLINIC | Age: 87
End: 2022-09-07
Payer: MEDICARE

## 2022-09-07 VITALS
OXYGEN SATURATION: 97 % | WEIGHT: 150.69 LBS | BODY MASS INDEX: 27.73 KG/M2 | HEIGHT: 62 IN | SYSTOLIC BLOOD PRESSURE: 122 MMHG | HEART RATE: 68 BPM | DIASTOLIC BLOOD PRESSURE: 64 MMHG

## 2022-09-07 DIAGNOSIS — M54.42 CHRONIC BILATERAL LOW BACK PAIN WITH BILATERAL SCIATICA: ICD-10-CM

## 2022-09-07 DIAGNOSIS — N89.8 VAGINAL ITCHING: ICD-10-CM

## 2022-09-07 DIAGNOSIS — M54.41 CHRONIC BILATERAL LOW BACK PAIN WITH BILATERAL SCIATICA: ICD-10-CM

## 2022-09-07 DIAGNOSIS — R30.0 DYSURIA: Primary | ICD-10-CM

## 2022-09-07 DIAGNOSIS — G89.29 CHRONIC BILATERAL LOW BACK PAIN WITH BILATERAL SCIATICA: ICD-10-CM

## 2022-09-07 PROCEDURE — 87086 URINE CULTURE/COLONY COUNT: CPT | Performed by: NURSE PRACTITIONER

## 2022-09-07 PROCEDURE — 1101F PT FALLS ASSESS-DOCD LE1/YR: CPT | Mod: CPTII,S$GLB,, | Performed by: NURSE PRACTITIONER

## 2022-09-07 PROCEDURE — 87077 CULTURE AEROBIC IDENTIFY: CPT | Performed by: NURSE PRACTITIONER

## 2022-09-07 PROCEDURE — 1160F RVW MEDS BY RX/DR IN RCRD: CPT | Mod: CPTII,S$GLB,, | Performed by: NURSE PRACTITIONER

## 2022-09-07 PROCEDURE — 1125F AMNT PAIN NOTED PAIN PRSNT: CPT | Mod: CPTII,S$GLB,, | Performed by: NURSE PRACTITIONER

## 2022-09-07 PROCEDURE — 1159F PR MEDICATION LIST DOCUMENTED IN MEDICAL RECORD: ICD-10-PCS | Mod: CPTII,S$GLB,, | Performed by: NURSE PRACTITIONER

## 2022-09-07 PROCEDURE — 99999 PR PBB SHADOW E&M-EST. PATIENT-LVL IV: ICD-10-PCS | Mod: PBBFAC,,, | Performed by: NURSE PRACTITIONER

## 2022-09-07 PROCEDURE — 3288F FALL RISK ASSESSMENT DOCD: CPT | Mod: CPTII,S$GLB,, | Performed by: NURSE PRACTITIONER

## 2022-09-07 PROCEDURE — 1101F PR PT FALLS ASSESS DOC 0-1 FALLS W/OUT INJ PAST YR: ICD-10-PCS | Mod: CPTII,S$GLB,, | Performed by: NURSE PRACTITIONER

## 2022-09-07 PROCEDURE — 1160F PR REVIEW ALL MEDS BY PRESCRIBER/CLIN PHARMACIST DOCUMENTED: ICD-10-PCS | Mod: CPTII,S$GLB,, | Performed by: NURSE PRACTITIONER

## 2022-09-07 PROCEDURE — 3288F PR FALLS RISK ASSESSMENT DOCUMENTED: ICD-10-PCS | Mod: CPTII,S$GLB,, | Performed by: NURSE PRACTITIONER

## 2022-09-07 PROCEDURE — 99214 PR OFFICE/OUTPT VISIT, EST, LEVL IV, 30-39 MIN: ICD-10-PCS | Mod: S$GLB,,, | Performed by: NURSE PRACTITIONER

## 2022-09-07 PROCEDURE — 87186 SC STD MICRODIL/AGAR DIL: CPT | Performed by: NURSE PRACTITIONER

## 2022-09-07 PROCEDURE — 87481 CANDIDA DNA AMP PROBE: CPT | Mod: 59 | Performed by: NURSE PRACTITIONER

## 2022-09-07 PROCEDURE — 87088 URINE BACTERIA CULTURE: CPT | Performed by: NURSE PRACTITIONER

## 2022-09-07 PROCEDURE — 1125F PR PAIN SEVERITY QUANTIFIED, PAIN PRESENT: ICD-10-PCS | Mod: CPTII,S$GLB,, | Performed by: NURSE PRACTITIONER

## 2022-09-07 PROCEDURE — 1159F MED LIST DOCD IN RCRD: CPT | Mod: CPTII,S$GLB,, | Performed by: NURSE PRACTITIONER

## 2022-09-07 PROCEDURE — 99214 OFFICE O/P EST MOD 30 MIN: CPT | Mod: S$GLB,,, | Performed by: NURSE PRACTITIONER

## 2022-09-07 PROCEDURE — 99999 PR PBB SHADOW E&M-EST. PATIENT-LVL IV: CPT | Mod: PBBFAC,,, | Performed by: NURSE PRACTITIONER

## 2022-09-07 RX ORDER — NAPROXEN 500 MG/1
500 TABLET ORAL 2 TIMES DAILY WITH MEALS
COMMUNITY
End: 2022-10-19

## 2022-09-07 NOTE — PROGRESS NOTES
Subjective:       Patient ID: Bhakti Phipps is a 88 y.o. female.    Chief Complaint: Follow-up (Left hip hurting 2 weeks)    HPI  New patient to me    Dysuria onset 3-4 days ago--took AZO x 2 days   Used vaginal estrogen cream prior to onset of dysuria which caused vaginal burning and itching. Denies discharge. Reports similar reaction happened with a different vaginal cream in the past      Reports left lower back/buttock pain--chronic, worsening. Was referred to PT but hasn't started.   Xray lumbar spine 9/2021 noted levoscoliosis and multilevel spondylosis   Uses otc analgesics prn     Vitals:    09/07/22 0929   BP: 122/64   Pulse: 68     Review of Systems   Constitutional:  Negative for fever.   Genitourinary:  Positive for dysuria and vaginal pain. Negative for vaginal bleeding and vaginal discharge.   Musculoskeletal:  Positive for back pain.     Past Medical History:   Diagnosis Date    Anxiety     Arthritis     History of melanoma     at age of 26    HTN (hypertension) 5/21/2012    Hypertension     Hypothyroid 5/21/2012    Hypothyroidism     Insomnia 2/25/2014    Melanoma     age 26    Osteoarthritis 5/21/2012    Tremor      Objective:      Physical Exam  Constitutional:       General: She is not in acute distress.     Appearance: She is well-developed. She is not ill-appearing, toxic-appearing or diaphoretic.   HENT:      Right Ear: Hearing normal.      Left Ear: Hearing normal.   Pulmonary:      Effort: No tachypnea or respiratory distress.   Skin:     Coloration: Skin is not pale.   Neurological:      Mental Status: She is alert and oriented to person, place, and time.   Psychiatric:         Speech: Speech normal.         Behavior: Behavior normal.         Thought Content: Thought content normal.         Judgment: Judgment normal.       Assessment:       1. Dysuria    2. Vaginal itching    3. Chronic bilateral low back pain with bilateral sciatica        Plan:       Dysuria  -     CULTURE, URINE  -      Urinalysis Microscopic; Future; Expected date: 09/07/2022    Vaginal itching  -     VAGINOSIS SCREEN BY DNA PROBE    Chronic bilateral low back pain with bilateral sciatica        DC vaginal creams. Workup as above  Start PT   education provided on supportive care, symptom monitoring and return precautions         Medication List with Changes/Refills   Current Medications    B COMPLEX VITAMINS TABLET    Take 1 tablet by mouth once daily.    BISACODYL 5 MG TAB    1-3 tablet DAILY (route: oral)    CHOLECALCIFEROL, VITAMIN D3, 100 MCG (4,000 UNIT) CAP CAPSULE    Take by mouth once daily.    HYDROCHLOROTHIAZIDE (HYDRODIURIL) 12.5 MG TAB    Take 1 tablet (12.5 mg total) by mouth once daily.    LEVOTHYROXINE (SYNTHROID) 125 MCG TABLET    TAKE 1 TABLET EVERY DAY    MAGNESIUM OXIDE 500 MG TAB    Take 1 tablet by mouth once daily.    NABUMETONE (RELAFEN) 500 MG TABLET        NAPROXEN (NAPROSYN) 500 MG TABLET    Take 500 mg by mouth 2 (two) times daily with meals.    PROPRANOLOL (INDERAL) 20 MG TABLET    TAKE 1 TABLET TWICE DAILY    TEMAZEPAM (RESTORIL) 7.5 MG CAP    Take 1 capsule (7.5 mg total) by mouth nightly as needed (Insomnia).    VENLAFAXINE (EFFEXOR-XR) 75 MG 24 HR CAPSULE    TAKE 1 CAPSULE EVERY DAY    VIT C/E/ZN/COPPR/LUTEIN/ZEAXAN (PRESERVISION AREDS 2 ORAL)    Take by mouth.   Discontinued Medications    ESTRADIOL (ESTRACE) 0.01 % (0.1 MG/GRAM) VAGINAL CREAM    PLACE 1 GRAM (1/4 APPLICATORFUL = 1GRAM) VAGINALLY TWICE A WEEK (DX. STRESS INCONTINENCE WITH ATROPHIC VAGINITIS)    TERCONAZOLE (TERAZOL 7) 0.4 % CREA    Place 1 applicator vaginally every evening.

## 2022-09-09 LAB
BACTERIAL VAGINOSIS DNA: NEGATIVE
CANDIDA GLABRATA DNA: NEGATIVE
CANDIDA KRUSEI DNA: NEGATIVE
CANDIDA RRNA VAG QL PROBE: NEGATIVE
T VAGINALIS RRNA GENITAL QL PROBE: NEGATIVE

## 2022-09-10 DIAGNOSIS — N30.00 ACUTE CYSTITIS WITHOUT HEMATURIA: Primary | ICD-10-CM

## 2022-09-10 LAB — BACTERIA UR CULT: ABNORMAL

## 2022-09-10 RX ORDER — NITROFURANTOIN 25; 75 MG/1; MG/1
100 CAPSULE ORAL 2 TIMES DAILY
Qty: 14 CAPSULE | Refills: 0 | Status: SHIPPED | OUTPATIENT
Start: 2022-09-10 | End: 2022-09-15

## 2022-09-12 ENCOUNTER — TELEPHONE (OUTPATIENT)
Dept: FAMILY MEDICINE | Facility: CLINIC | Age: 87
End: 2022-09-12
Payer: MEDICARE

## 2022-09-12 NOTE — TELEPHONE ENCOUNTER
----- Message from Jeffry Hua sent at 9/9/2022  4:47 PM CDT -----  Contact: self  Type: Needs Medical Advice  Who Called: patient   Pharmacy name and phone #:   [facility] - [place] - [address]  [address]  [place] [address]  Phone: [phone] Fax: [phone]  Best Call Back Number: [phone]  Additional Information: Pt state she is still burning a little but wanted to know the results of her test and also wants to know should she keep taking azo says it helped a little but the symptoms are still there just not as bad. Thanks

## 2022-09-15 ENCOUNTER — OFFICE VISIT (OUTPATIENT)
Dept: FAMILY MEDICINE | Facility: CLINIC | Age: 87
End: 2022-09-15
Payer: MEDICARE

## 2022-09-15 VITALS — TEMPERATURE: 99 F

## 2022-09-15 DIAGNOSIS — R52 BODY ACHES: ICD-10-CM

## 2022-09-15 DIAGNOSIS — R05.9 COUGH: Primary | ICD-10-CM

## 2022-09-15 DIAGNOSIS — N30.00 ACUTE INFECTIVE CYSTITIS: ICD-10-CM

## 2022-09-15 LAB
CTP QC/QA: YES
CTP QC/QA: YES
POC MOLECULAR INFLUENZA A AGN: NEGATIVE
POC MOLECULAR INFLUENZA B AGN: NEGATIVE
SARS-COV-2 RDRP RESP QL NAA+PROBE: NEGATIVE

## 2022-09-15 PROCEDURE — 1101F PR PT FALLS ASSESS DOC 0-1 FALLS W/OUT INJ PAST YR: ICD-10-PCS | Mod: CPTII,S$GLB,, | Performed by: FAMILY MEDICINE

## 2022-09-15 PROCEDURE — 87502 INFLUENZA DNA AMP PROBE: CPT | Mod: QW,S$GLB,, | Performed by: FAMILY MEDICINE

## 2022-09-15 PROCEDURE — 1101F PT FALLS ASSESS-DOCD LE1/YR: CPT | Mod: CPTII,S$GLB,, | Performed by: FAMILY MEDICINE

## 2022-09-15 PROCEDURE — 1159F MED LIST DOCD IN RCRD: CPT | Mod: CPTII,S$GLB,, | Performed by: FAMILY MEDICINE

## 2022-09-15 PROCEDURE — 1159F PR MEDICATION LIST DOCUMENTED IN MEDICAL RECORD: ICD-10-PCS | Mod: CPTII,S$GLB,, | Performed by: FAMILY MEDICINE

## 2022-09-15 PROCEDURE — 99999 PR PBB SHADOW E&M-EST. PATIENT-LVL III: CPT | Mod: PBBFAC,,, | Performed by: FAMILY MEDICINE

## 2022-09-15 PROCEDURE — 3288F PR FALLS RISK ASSESSMENT DOCUMENTED: ICD-10-PCS | Mod: CPTII,S$GLB,, | Performed by: FAMILY MEDICINE

## 2022-09-15 PROCEDURE — 99214 OFFICE O/P EST MOD 30 MIN: CPT | Mod: S$GLB,,, | Performed by: FAMILY MEDICINE

## 2022-09-15 PROCEDURE — 87502 POCT INFLUENZA A/B MOLECULAR: ICD-10-PCS | Mod: QW,S$GLB,, | Performed by: FAMILY MEDICINE

## 2022-09-15 PROCEDURE — 3288F FALL RISK ASSESSMENT DOCD: CPT | Mod: CPTII,S$GLB,, | Performed by: FAMILY MEDICINE

## 2022-09-15 PROCEDURE — U0002: ICD-10-PCS | Mod: QW,S$GLB,, | Performed by: FAMILY MEDICINE

## 2022-09-15 PROCEDURE — U0002 COVID-19 LAB TEST NON-CDC: HCPCS | Mod: QW,S$GLB,, | Performed by: FAMILY MEDICINE

## 2022-09-15 PROCEDURE — 99999 PR PBB SHADOW E&M-EST. PATIENT-LVL III: ICD-10-PCS | Mod: PBBFAC,,, | Performed by: FAMILY MEDICINE

## 2022-09-15 PROCEDURE — 99214 PR OFFICE/OUTPT VISIT, EST, LEVL IV, 30-39 MIN: ICD-10-PCS | Mod: S$GLB,,, | Performed by: FAMILY MEDICINE

## 2022-09-15 RX ORDER — CEPHALEXIN 500 MG/1
500 CAPSULE ORAL EVERY 12 HOURS
Qty: 10 CAPSULE | Refills: 0 | Status: SHIPPED | OUTPATIENT
Start: 2022-09-15 | End: 2022-10-19

## 2022-09-15 NOTE — PROGRESS NOTES
THIS DOCUMENT WAS MADE IN PART WITH VOICE RECOGNITION SOFTWARE.  OCCASIONALLY THIS SOFTWARE WILL MISINTERPRET WORDS OR PHRASES.    Assessment and Plan:    1. Cough  POCT COVID-19 Rapid Screening    POCT Influenza A/B Molecular      2. Body aches  POCT COVID-19 Rapid Screening    POCT Influenza A/B Molecular      3. Acute infective cystitis  cephALEXin (KEFLEX) 500 MG capsule          PLAN    Suspect patient has viral syndrome versus persistent acute infective cystitis  New antibiotic Keflex 500 mg to take twice daily for 5 days     COVID in flu test negative      ______________________________________________________________________  Subjective:    Chief Complaint:  Chief Complaint   Patient presents with    Fever    Cough    Abdominal Pain    Generalized Body Aches    Dysuria    Shortness of Breath     With exertion        HPI:  Bhakti is a 88 y.o. year old     88-year-old female presents today complaining of low-grade fever, cough, abdominal discomfort, generalized body aches, dysuria and some exertional shortness of breath     She does report that she was recently treated for acute infective cystitis after a urine culture came back positive for pansensitive E coli   She completed the course of Macrobid but still having some dysuria and worsening fever/respiratory symptoms     She did test negative for COVID and influenza today on the office test    Past Medical History:  Past Medical History:   Diagnosis Date    Anxiety     Arthritis     History of melanoma     at age of 26    HTN (hypertension) 5/21/2012    Hypertension     Hypothyroid 5/21/2012    Hypothyroidism     Insomnia 2/25/2014    Melanoma     age 26    Osteoarthritis 5/21/2012    Tremor        Past Surgical History:  Past Surgical History:   Procedure Laterality Date    CHOLECYSTECTOMY  10-11    lap ayse    HYSTERECTOMY      with oopherectomy    JOINT REPLACEMENT      L knee    RADICAL NECK DISSECTION      for melanoma       Family History:  Family  History   Problem Relation Age of Onset    Pancreatic cancer Mother     Leukemia Father     Lung cancer Brother     Liver cancer Sister        Social History:  Social History     Socioeconomic History    Marital status:    Tobacco Use    Smoking status: Former     Types: Cigarettes     Quit date: 1975     Years since quittin.3    Smokeless tobacco: Never   Substance and Sexual Activity    Alcohol use: No    Drug use: No    Sexual activity: Never       Medications:  Current Outpatient Medications on File Prior to Visit   Medication Sig Dispense Refill    b complex vitamins tablet Take 1 tablet by mouth once daily.      bisacodyL 5 mg Tab 1-3 tablet DAILY (route: oral)      cholecalciferol, vitamin D3, 100 mcg (4,000 unit) Cap capsule Take by mouth once daily.      hydroCHLOROthiazide (HYDRODIURIL) 12.5 MG Tab Take 1 tablet (12.5 mg total) by mouth once daily. 90 tablet 1    levothyroxine (SYNTHROID) 125 MCG tablet TAKE 1 TABLET EVERY DAY 90 tablet 3    magnesium oxide 500 mg Tab Take 1 tablet by mouth once daily.      nabumetone (RELAFEN) 500 MG tablet       naproxen (NAPROSYN) 500 MG tablet Take 500 mg by mouth 2 (two) times daily with meals.      propranoloL (INDERAL) 20 MG tablet TAKE 1 TABLET TWICE DAILY 180 tablet 1    temazepam (RESTORIL) 7.5 MG Cap Take 1 capsule (7.5 mg total) by mouth nightly as needed (Insomnia). 90 capsule 0    venlafaxine (EFFEXOR-XR) 75 MG 24 hr capsule TAKE 1 CAPSULE EVERY DAY 90 capsule 2    VIT C/E/ZN/COPPR/LUTEIN/ZEAXAN (PRESERVISION AREDS 2 ORAL) Take by mouth.      [DISCONTINUED] nitrofurantoin, macrocrystal-monohydrate, (MACROBID) 100 MG capsule Take 1 capsule (100 mg total) by mouth 2 (two) times daily. for 7 days 14 capsule 0     No current facility-administered medications on file prior to visit.       Allergies:  Sulfa (sulfonamide antibiotics)    Immunizations:  Immunization History   Administered Date(s) Administered    COVID-19, MRNA, LN-S, PF (Pfizer)  ([place]ila Cap) 07/18/2022    COVID-19, MRNA, LN-S, PF (Pfizer) (Purple Cap) 01/10/2021, 01/31/2021, 08/18/2021    H1N1 Swine Flu Vaccine 12/09/2009    Influenza 10/10/2007, 09/24/2008, 09/29/2010    Influenza - High Dose - PF (65 years and older) 09/21/2011, 10/02/2012, 10/02/2012, 10/07/2013, 10/07/2013, 09/29/2014, 09/29/2014, 09/28/2015, 09/28/2015, 09/20/2016, 10/03/2017, 10/08/2018, 09/20/2019, 10/15/2020    Influenza - Quadrivalent 10/20/2020    Influenza - Quadrivalent - High Dose - PF (65 years and older) 10/13/2021    Influenza - Trivalent (ADULT) 10/10/2007, 09/24/2008, 09/20/2009, 09/29/2010    Influenza A (H1N1) 2009 Monovalent - IM 12/07/2009    Influenza Split 09/20/2009    Pneumococcal Conjugate - 13 Valent 09/28/2015    Pneumococcal Polysaccharide - 23 Valent 11/08/2000    Tdap 01/20/2017    Zoster 07/30/2007    Zoster Recombinant 09/04/2020, 09/21/2020, 11/17/2020, 11/23/2020       Review of Systems:  Review of Systems   Constitutional:  Positive for fever.   HENT:  Positive for congestion and sore throat. Negative for ear pain.    Respiratory:  Positive for cough. Negative for wheezing.    Gastrointestinal:  Negative for diarrhea, nausea and vomiting.   Genitourinary:  Positive for dysuria.   Skin:  Negative for rash.   Neurological:  Negative for headaches.   All other systems reviewed and are negative.    Objective:    Vitals:  Vitals:    09/15/22 1400   Temp: 98.5 °F (36.9 °C)       Physical Exam  Vitals reviewed.   Constitutional:       General: She is not in acute distress.     Appearance: She is well-developed.   HENT:      Head: Normocephalic and atraumatic.   Pulmonary:      Effort: Pulmonary effort is normal. No respiratory distress.      Breath sounds: No wheezing or rhonchi.   Abdominal:      Comments: No CVA tenderness   Musculoskeletal:      Cervical back: Normal range of motion.   Psychiatric:         Behavior: Behavior normal.         Thought Content: Thought content normal.          Judgment: Judgment normal.           Olman Duff MD  Family Medicine

## 2022-09-16 ENCOUNTER — PATIENT OUTREACH (OUTPATIENT)
Dept: ADMINISTRATIVE | Facility: HOSPITAL | Age: 87
End: 2022-09-16
Payer: MEDICARE

## 2022-09-16 DIAGNOSIS — Z78.0 POSTMENOPAUSAL STATE: Primary | ICD-10-CM

## 2022-09-16 NOTE — PROGRESS NOTES
Osteo Improvement-[facility]    Non-compliant report chart audits for OSTEOPOROSIS SCREENING. Chart review completed for HM test overdue (mammograms, Colonoscopies, pap smears, DM labs, and/or EYE EXAMs)       DIS reviewed for test needed.  Dexa Scan      Outreach to patient in reference to dexa scan    RE:  Patient needs to schedule dexa scan.    Scheduled 10.5.22

## 2022-10-05 ENCOUNTER — HOSPITAL ENCOUNTER (OUTPATIENT)
Dept: RADIOLOGY | Facility: HOSPITAL | Age: 87
Discharge: HOME | End: 2022-10-05
Attending: INTERNAL MEDICINE
Payer: MEDICARE

## 2022-10-05 DIAGNOSIS — Z78.0 POSTMENOPAUSAL STATE: ICD-10-CM

## 2022-10-05 PROCEDURE — 77080 DXA BONE DENSITY AXIAL: CPT | Mod: TC,PO

## 2022-10-05 PROCEDURE — 77080 DXA BONE DENSITY AXIAL: CPT | Mod: 26,,, | Performed by: RADIOLOGY

## 2022-10-05 PROCEDURE — 77080 DEXA BONE DENSITY SPINE HIP: ICD-10-PCS | Mod: 26,,, | Performed by: RADIOLOGY

## 2022-10-17 ENCOUNTER — PATIENT MESSAGE (OUTPATIENT)
Dept: FAMILY MEDICINE | Facility: CLINIC | Age: 87
End: 2022-10-17
Payer: MEDICARE

## 2022-10-19 ENCOUNTER — PATIENT MESSAGE (OUTPATIENT)
Dept: RHEUMATOLOGY | Facility: CLINIC | Age: 87
End: 2022-10-19
Payer: MEDICARE

## 2022-10-19 ENCOUNTER — OFFICE VISIT (OUTPATIENT)
Dept: FAMILY MEDICINE | Facility: CLINIC | Age: 87
End: 2022-10-19
Payer: MEDICARE

## 2022-10-19 ENCOUNTER — LAB VISIT (OUTPATIENT)
Dept: LAB | Facility: HOSPITAL | Age: 87
End: 2022-10-19
Attending: INTERNAL MEDICINE
Payer: MEDICARE

## 2022-10-19 VITALS
HEART RATE: 57 BPM | HEIGHT: 61 IN | OXYGEN SATURATION: 97 % | WEIGHT: 144.75 LBS | SYSTOLIC BLOOD PRESSURE: 132 MMHG | BODY MASS INDEX: 27.33 KG/M2 | DIASTOLIC BLOOD PRESSURE: 62 MMHG

## 2022-10-19 DIAGNOSIS — G47.01 INSOMNIA DUE TO MEDICAL CONDITION: ICD-10-CM

## 2022-10-19 DIAGNOSIS — R42 VERTIGO: Primary | ICD-10-CM

## 2022-10-19 DIAGNOSIS — H91.93 BILATERAL HEARING LOSS, UNSPECIFIED HEARING LOSS TYPE: ICD-10-CM

## 2022-10-19 DIAGNOSIS — E03.9 ACQUIRED HYPOTHYROIDISM: ICD-10-CM

## 2022-10-19 DIAGNOSIS — I10 PRIMARY HYPERTENSION: ICD-10-CM

## 2022-10-19 LAB
ANION GAP SERPL CALC-SCNC: 11 MMOL/L (ref 8–16)
BUN SERPL-MCNC: 29 MG/DL (ref 8–23)
CALCIUM SERPL-MCNC: 9.9 MG/DL (ref 8.7–10.5)
CHLORIDE SERPL-SCNC: 103 MMOL/L (ref 95–110)
CO2 SERPL-SCNC: 25 MMOL/L (ref 23–29)
CREAT SERPL-MCNC: 0.9 MG/DL (ref 0.5–1.4)
EST. GFR  (NO RACE VARIABLE): >60 ML/MIN/1.73 M^2
GLUCOSE SERPL-MCNC: 77 MG/DL (ref 70–110)
POTASSIUM SERPL-SCNC: 4.7 MMOL/L (ref 3.5–5.1)
SODIUM SERPL-SCNC: 139 MMOL/L (ref 136–145)
T4 FREE SERPL-MCNC: 1.77 NG/DL (ref 0.71–1.51)
TSH SERPL DL<=0.005 MIU/L-ACNC: 5.56 UIU/ML (ref 0.4–4)

## 2022-10-19 PROCEDURE — 1160F RVW MEDS BY RX/DR IN RCRD: CPT | Mod: CPTII,S$GLB,, | Performed by: INTERNAL MEDICINE

## 2022-10-19 PROCEDURE — 1159F MED LIST DOCD IN RCRD: CPT | Mod: CPTII,S$GLB,, | Performed by: INTERNAL MEDICINE

## 2022-10-19 PROCEDURE — 99214 OFFICE O/P EST MOD 30 MIN: CPT | Mod: S$GLB,,, | Performed by: INTERNAL MEDICINE

## 2022-10-19 PROCEDURE — 1160F PR REVIEW ALL MEDS BY PRESCRIBER/CLIN PHARMACIST DOCUMENTED: ICD-10-PCS | Mod: CPTII,S$GLB,, | Performed by: INTERNAL MEDICINE

## 2022-10-19 PROCEDURE — 80048 BASIC METABOLIC PNL TOTAL CA: CPT | Performed by: INTERNAL MEDICINE

## 2022-10-19 PROCEDURE — 1159F PR MEDICATION LIST DOCUMENTED IN MEDICAL RECORD: ICD-10-PCS | Mod: CPTII,S$GLB,, | Performed by: INTERNAL MEDICINE

## 2022-10-19 PROCEDURE — 99214 PR OFFICE/OUTPT VISIT, EST, LEVL IV, 30-39 MIN: ICD-10-PCS | Mod: S$GLB,,, | Performed by: INTERNAL MEDICINE

## 2022-10-19 PROCEDURE — 1101F PT FALLS ASSESS-DOCD LE1/YR: CPT | Mod: CPTII,S$GLB,, | Performed by: INTERNAL MEDICINE

## 2022-10-19 PROCEDURE — 3288F FALL RISK ASSESSMENT DOCD: CPT | Mod: CPTII,S$GLB,, | Performed by: INTERNAL MEDICINE

## 2022-10-19 PROCEDURE — 1126F AMNT PAIN NOTED NONE PRSNT: CPT | Mod: CPTII,S$GLB,, | Performed by: INTERNAL MEDICINE

## 2022-10-19 PROCEDURE — 1101F PR PT FALLS ASSESS DOC 0-1 FALLS W/OUT INJ PAST YR: ICD-10-PCS | Mod: CPTII,S$GLB,, | Performed by: INTERNAL MEDICINE

## 2022-10-19 PROCEDURE — 84443 ASSAY THYROID STIM HORMONE: CPT | Performed by: INTERNAL MEDICINE

## 2022-10-19 PROCEDURE — 36415 COLL VENOUS BLD VENIPUNCTURE: CPT | Mod: PN | Performed by: INTERNAL MEDICINE

## 2022-10-19 PROCEDURE — 84439 ASSAY OF FREE THYROXINE: CPT | Performed by: INTERNAL MEDICINE

## 2022-10-19 PROCEDURE — 3288F PR FALLS RISK ASSESSMENT DOCUMENTED: ICD-10-PCS | Mod: CPTII,S$GLB,, | Performed by: INTERNAL MEDICINE

## 2022-10-19 PROCEDURE — 99999 PR PBB SHADOW E&M-EST. PATIENT-LVL V: ICD-10-PCS | Mod: PBBFAC,,, | Performed by: INTERNAL MEDICINE

## 2022-10-19 PROCEDURE — 99999 PR PBB SHADOW E&M-EST. PATIENT-LVL V: CPT | Mod: PBBFAC,,, | Performed by: INTERNAL MEDICINE

## 2022-10-19 PROCEDURE — 1126F PR PAIN SEVERITY QUANTIFIED, NO PAIN PRESENT: ICD-10-PCS | Mod: CPTII,S$GLB,, | Performed by: INTERNAL MEDICINE

## 2022-10-19 RX ORDER — TEMAZEPAM 7.5 MG/1
7.5 CAPSULE ORAL NIGHTLY PRN
Qty: 30 CAPSULE | Refills: 0 | Status: SHIPPED | OUTPATIENT
Start: 2022-10-19 | End: 2023-02-15

## 2022-10-19 RX ORDER — MECLIZINE HCL 12.5 MG 12.5 MG/1
12.5 TABLET ORAL 3 TIMES DAILY PRN
Qty: 30 TABLET | Refills: 0 | Status: SHIPPED | OUTPATIENT
Start: 2022-10-19 | End: 2023-12-11

## 2022-10-19 NOTE — PROGRESS NOTES
Assessment and Plan:    1. Vertigo  - meclizine (ANTIVERT) 12.5 mg tablet; Take 1 tablet (12.5 mg total) by mouth 3 (three) times daily as needed for Nausea or Dizziness.  Dispense: 30 tablet; Refill: 0  - Ambulatory referral/consult to ENT; Future    2. Insomnia due to medical condition  - temazepam (RESTORIL) 7.5 MG Cap; Take 1 capsule (7.5 mg total) by mouth nightly as needed (Insomnia).  Dispense: 30 capsule; Refill: 0    3. Bilateral hearing loss, unspecified hearing loss type  - Ambulatory referral/consult to ENT; Future      ______________________________________________________________________  Subjective:    Chief Complaint:  Follow up chronic medical conditions    HPI:  Bhakti is a 88 y.o. year old female here for follow up of chronic medical conditions    She has been going to , but has had two episodes of vertigo while she was there after they had been having her turn over. Second episode she felt nauseated and threw up, so she had been advised to see her doctor before continuing. She reports she has had these episodes before, but has never really been evaluated for it. She has some hearing loss. Notes that her mother and her sister were both chronically on meclizine for vertigo, but she is not sure if there was any specific diagnosis.    HTN- Taking HCTZ 12.5 and propranolol 20 mg BID. BP has been controlled.    Insomnia- Has weaned off of temazepam other than rare use for nights of insomnia. Melatonin was not effective.    Anxiety- Taking venlafaxine 75 mg daily.    Hypothyroidism- Taking levothyroxine 125 mcg daily.      Medications:  Current Outpatient Medications on File Prior to Visit   Medication Sig Dispense Refill    b complex vitamins tablet Take 1 tablet by mouth once daily.      cholecalciferol, vitamin D3, 100 mcg (4,000 unit) Cap capsule Take by mouth once daily.      hydroCHLOROthiazide (HYDRODIURIL) 12.5 MG Tab Take 1 tablet (12.5 mg total) by mouth once daily. 90 tablet 1     "levothyroxine (SYNTHROID) 125 MCG tablet TAKE 1 TABLET EVERY DAY 90 tablet 0    magnesium oxide 500 mg Tab Take 1 tablet by mouth once daily.      nabumetone (RELAFEN) 500 MG tablet Take 500 mg by mouth once daily.      propranoloL (INDERAL) 20 MG tablet TAKE 1 TABLET TWICE DAILY 180 tablet 1    tiZANidine (ZANAFLEX) 4 MG tablet Take 1 tablet (4 mg total) by mouth every 8 (eight) hours as needed. 15 tablet 0    venlafaxine (EFFEXOR-XR) 75 MG 24 hr capsule TAKE 1 CAPSULE EVERY DAY 90 capsule 2    VIT C/E/ZN/COPPR/LUTEIN/ZEAXAN (PRESERVISION AREDS 2 ORAL) Take by mouth.      bisacodyL 5 mg Tab 1-3 tablet DAILY (route: oral)      naproxen (NAPROSYN) 500 MG tablet Take 500 mg by mouth 2 (two) times daily with meals.      [DISCONTINUED] cephALEXin (KEFLEX) 500 MG capsule Take 1 capsule (500 mg total) by mouth every 12 (twelve) hours. (Patient not taking: Reported on 10/19/2022) 10 capsule 0    [DISCONTINUED] temazepam (RESTORIL) 7.5 MG Cap Take 1 capsule (7.5 mg total) by mouth nightly as needed (Insomnia). 90 capsule 0     No current facility-administered medications on file prior to visit.       Review of Systems:  Review of Systems   Constitutional:  Negative for chills and fever.   HENT:  Positive for hearing loss. Negative for ear discharge and ear pain.    Neurological:  Positive for dizziness. Negative for light-headedness.     Past Medical History:  Past Medical History:   Diagnosis Date    Anxiety     Arthritis     History of melanoma     at age of 26    HTN (hypertension) 5/21/2012    Hypertension     Hypothyroid 5/21/2012    Hypothyroidism     Insomnia 2/25/2014    Melanoma     age 26    Osteoarthritis 5/21/2012    Tremor        Objective:    Vitals:  Vitals:    10/19/22 1008 10/19/22 1056   BP: (!) 140/80 132/62   Pulse: (!) 57    SpO2: 97%    Weight: 65.6 kg (144 lb 11.7 oz)    Height: 5' 1" (1.549 m)    PainSc: 0-No pain        Physical Exam  Vitals reviewed.   Constitutional:       General: She is not in " acute distress.     Appearance: She is well-developed.   HENT:      Right Ear: Tympanic membrane and ear canal normal.      Left Ear: Tympanic membrane and ear canal normal.   Eyes:      General: No scleral icterus.  Cardiovascular:      Rate and Rhythm: Normal rate and regular rhythm.      Heart sounds: No murmur heard.  Pulmonary:      Effort: Pulmonary effort is normal. No respiratory distress.      Breath sounds: Normal breath sounds. No wheezing, rhonchi or rales.   Musculoskeletal:      Right lower leg: No edema.      Left lower leg: No edema.   Skin:     General: Skin is warm and dry.   Neurological:      Mental Status: She is alert and oriented to person, place, and time.   Psychiatric:         Behavior: Behavior normal.       Data:  Cr 0.9 on last labs      Zoë Garcia MD  Internal Medicine

## 2022-10-20 ENCOUNTER — TELEPHONE (OUTPATIENT)
Dept: FAMILY MEDICINE | Facility: CLINIC | Age: 87
End: 2022-10-20
Payer: MEDICARE

## 2022-10-20 DIAGNOSIS — E03.9 ACQUIRED HYPOTHYROIDISM: Primary | ICD-10-CM

## 2022-10-21 NOTE — TELEPHONE ENCOUNTER
Please call patient about labs. Metabolic panel is generally normal. The thyroid labs are confusing, in that one of them indicates she is taking too much thyroid medication, and the other indicates she is taking too little. I want to recheck these in about a month. During this time, I want her to make sure she is taking the 125 mcg of levothyroxine every morning on an empty stomach, and not eating or drinking anything for 30 minutes after. This will help minimize any abnormalities on the labs.

## 2022-10-26 ENCOUNTER — TELEPHONE (OUTPATIENT)
Dept: FAMILY MEDICINE | Facility: CLINIC | Age: 87
End: 2022-10-26
Payer: MEDICARE

## 2022-10-26 NOTE — TELEPHONE ENCOUNTER
----- Message from Li Martinez sent at 10/26/2022  8:42 AM CDT -----  Patient Returning Call    Who Called:PT   Who Left Message for Patient:MARTHA  Does the patient know what this is regarding?:RESULTS   Best Call Back Number:[phone], LEAVE  VOICEMAIL IF NO ANSWER

## 2022-10-27 NOTE — TELEPHONE ENCOUNTER
Spoke w/ pt. She was not taking her meds correctly/consistently. Clarified how she is to take it and she repeated back. Repeat labs sched for 11/29/22, per pt request.

## 2022-10-27 NOTE — TELEPHONE ENCOUNTER
----- Message from Meghan Leong sent at 10/27/2022  9:17 AM CDT -----  Contact: [phone]  Type:  Patient Returning Call    Who Called:  Pt   Who Left Message for Patient:  Nicky  Does the patient know what this is regarding?:  Lab results   Best Call Back Number:  [phone]

## 2022-11-16 ENCOUNTER — TELEPHONE (OUTPATIENT)
Dept: FAMILY MEDICINE | Facility: CLINIC | Age: 87
End: 2022-11-16
Payer: MEDICARE

## 2022-11-16 NOTE — TELEPHONE ENCOUNTER
----- Message from Courtney Hernandez sent at 11/16/2022  9:09 AM CST -----  Type:  Same Day Appointment Request    Caller is requesting a same day appointment.  Caller declined first available appointment listed below.      Name of Caller:  pt  When is the first available appointment?  4/14  Symptoms:  lower back pain--pt said she need to be seen today--please call and advise  Best Call Back Number:  [phone] (home)     Additional Information:   thank you

## 2022-11-17 ENCOUNTER — OFFICE VISIT (OUTPATIENT)
Dept: FAMILY MEDICINE | Facility: CLINIC | Age: 87
End: 2022-11-17
Payer: MEDICARE

## 2022-11-17 VITALS
SYSTOLIC BLOOD PRESSURE: 124 MMHG | RESPIRATION RATE: 18 BRPM | WEIGHT: 144.81 LBS | HEIGHT: 61 IN | BODY MASS INDEX: 27.34 KG/M2 | DIASTOLIC BLOOD PRESSURE: 76 MMHG

## 2022-11-17 DIAGNOSIS — L21.9 SEBORRHEIC DERMATITIS: ICD-10-CM

## 2022-11-17 DIAGNOSIS — G89.29 CHRONIC RIGHT-SIDED THORACIC BACK PAIN: Primary | ICD-10-CM

## 2022-11-17 DIAGNOSIS — M54.6 CHRONIC RIGHT-SIDED THORACIC BACK PAIN: Primary | ICD-10-CM

## 2022-11-17 PROCEDURE — 1160F RVW MEDS BY RX/DR IN RCRD: CPT | Mod: CPTII,S$GLB,, | Performed by: STUDENT IN AN ORGANIZED HEALTH CARE EDUCATION/TRAINING PROGRAM

## 2022-11-17 PROCEDURE — 1159F MED LIST DOCD IN RCRD: CPT | Mod: CPTII,S$GLB,, | Performed by: STUDENT IN AN ORGANIZED HEALTH CARE EDUCATION/TRAINING PROGRAM

## 2022-11-17 PROCEDURE — 99213 OFFICE O/P EST LOW 20 MIN: CPT | Mod: S$GLB,,, | Performed by: STUDENT IN AN ORGANIZED HEALTH CARE EDUCATION/TRAINING PROGRAM

## 2022-11-17 PROCEDURE — 1159F PR MEDICATION LIST DOCUMENTED IN MEDICAL RECORD: ICD-10-PCS | Mod: CPTII,S$GLB,, | Performed by: STUDENT IN AN ORGANIZED HEALTH CARE EDUCATION/TRAINING PROGRAM

## 2022-11-17 PROCEDURE — 1101F PR PT FALLS ASSESS DOC 0-1 FALLS W/OUT INJ PAST YR: ICD-10-PCS | Mod: CPTII,S$GLB,, | Performed by: STUDENT IN AN ORGANIZED HEALTH CARE EDUCATION/TRAINING PROGRAM

## 2022-11-17 PROCEDURE — 1125F AMNT PAIN NOTED PAIN PRSNT: CPT | Mod: CPTII,S$GLB,, | Performed by: STUDENT IN AN ORGANIZED HEALTH CARE EDUCATION/TRAINING PROGRAM

## 2022-11-17 PROCEDURE — 3288F FALL RISK ASSESSMENT DOCD: CPT | Mod: CPTII,S$GLB,, | Performed by: STUDENT IN AN ORGANIZED HEALTH CARE EDUCATION/TRAINING PROGRAM

## 2022-11-17 PROCEDURE — 99999 PR PBB SHADOW E&M-EST. PATIENT-LVL IV: ICD-10-PCS | Mod: PBBFAC,,, | Performed by: STUDENT IN AN ORGANIZED HEALTH CARE EDUCATION/TRAINING PROGRAM

## 2022-11-17 PROCEDURE — 99213 PR OFFICE/OUTPT VISIT, EST, LEVL III, 20-29 MIN: ICD-10-PCS | Mod: S$GLB,,, | Performed by: STUDENT IN AN ORGANIZED HEALTH CARE EDUCATION/TRAINING PROGRAM

## 2022-11-17 PROCEDURE — 1160F PR REVIEW ALL MEDS BY PRESCRIBER/CLIN PHARMACIST DOCUMENTED: ICD-10-PCS | Mod: CPTII,S$GLB,, | Performed by: STUDENT IN AN ORGANIZED HEALTH CARE EDUCATION/TRAINING PROGRAM

## 2022-11-17 PROCEDURE — 1101F PT FALLS ASSESS-DOCD LE1/YR: CPT | Mod: CPTII,S$GLB,, | Performed by: STUDENT IN AN ORGANIZED HEALTH CARE EDUCATION/TRAINING PROGRAM

## 2022-11-17 PROCEDURE — 99999 PR PBB SHADOW E&M-EST. PATIENT-LVL IV: CPT | Mod: PBBFAC,,, | Performed by: STUDENT IN AN ORGANIZED HEALTH CARE EDUCATION/TRAINING PROGRAM

## 2022-11-17 PROCEDURE — 3288F PR FALLS RISK ASSESSMENT DOCUMENTED: ICD-10-PCS | Mod: CPTII,S$GLB,, | Performed by: STUDENT IN AN ORGANIZED HEALTH CARE EDUCATION/TRAINING PROGRAM

## 2022-11-17 PROCEDURE — 1125F PR PAIN SEVERITY QUANTIFIED, PAIN PRESENT: ICD-10-PCS | Mod: CPTII,S$GLB,, | Performed by: STUDENT IN AN ORGANIZED HEALTH CARE EDUCATION/TRAINING PROGRAM

## 2022-11-17 RX ORDER — KETOCONAZOLE 20 MG/ML
SHAMPOO, SUSPENSION TOPICAL
Qty: 120 ML | Refills: 1 | Status: SHIPPED | OUTPATIENT
Start: 2022-11-17 | End: 2023-12-11 | Stop reason: SDUPTHER

## 2022-11-17 RX ORDER — METHYLPREDNISOLONE 4 MG/1
TABLET ORAL
Qty: 1 EACH | Refills: 0 | Status: SHIPPED | OUTPATIENT
Start: 2022-11-17 | End: 2023-04-14

## 2022-11-17 RX ORDER — METHYLPREDNISOLONE 4 MG/1
TABLET ORAL
Qty: 1 EACH | Refills: 0 | Status: SHIPPED | OUTPATIENT
Start: 2022-11-17 | End: 2022-11-17

## 2022-11-17 NOTE — PROGRESS NOTES
Subjective:      Patient ID: Bhakti Phipps is a 88 y.o. female    Chief Complaint   Patient presents with    Back Pain     40 yrs in between shoulder blades     HPI  88 y.o. female with a PMHx as documented below presents to clinic today for the following:  - Pt reports acute on chronic right-sided thoracic back pain present for the past several years, worse over the past few weeks. Pt states she has gone to physical therapy for this concern before with temporary improvement. She reports previous treatments including steroid shots, which helped, and muscle relaxers, which did not work. Pt has also tried lidocaine patches without relief. No associated numbness/tingling/weakness of the BUE. Of note, pt is currently following with Rheumatology for possible inflammatory arthritis.     Past Medical History:   Diagnosis Date    Anxiety     Arthritis     Basal cell carcinoma of skin of other and unspecified parts of face 11/21/2012    Dx updated per 2019 IMO Load    Essential hypertension 5/21/2012    History of melanoma     at age of 26    HTN (hypertension) 5/21/2012    Hyperlipidemia 11/10/2018    Hypertension     Hypothyroid 5/21/2012    Hypothyroidism     Hypothyroidism due to acquired atrophy of thyroid 5/21/2012    Idiopathic scoliosis of thoracolumbar spine 2/28/2019    Insomnia 2/25/2014    Insomnia 2/25/2014    Intermediate stage nonexudative age-related macular degeneration of both eyes 4/12/2018    Melanoma     age 26    Osteoarthritis 5/21/2012    Osteoarthritis 5/21/2012    Postmenopausal 8/13/2019    Stress incontinence 4/12/2018    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   Respiratory:  Negative for shortness of breath.    Cardiovascular:  Negative for chest pain.   Gastrointestinal:  Negative for abdominal pain, constipation, diarrhea, nausea and vomiting.   Genitourinary:  Negative for dysuria.   Musculoskeletal:  Positive for back  "pain and myalgias.     Objective:      Vitals:    11/17/22 1307   BP: 124/76   BP Location: Left arm   Patient Position: Sitting   Resp: 18   Weight: 65.7 kg (144 lb 13.5 oz)   Height: 5' 1" (1.549 m)     Physical Exam  Vitals reviewed.   Constitutional:       General: She is not in acute distress.  HENT:      Head: Normocephalic and atraumatic.   Cardiovascular:      Rate and Rhythm: Normal rate.   Pulmonary:      Effort: Pulmonary effort is normal. No respiratory distress.   Abdominal:      General: Bowel sounds are normal. There is no distension.      Palpations: Abdomen is soft.      Tenderness: There is no abdominal tenderness.   Musculoskeletal:      Cervical back: Normal.      Thoracic back: Tenderness (right side, muscle tenderness) present. No bony tenderness.      Lumbar back: Normal.   Neurological:      General: No focal deficit present.      Mental Status: She is alert and oriented to person, place, and time. Mental status is at baseline.      Assessment:       1. Chronic right-sided thoracic back pain    2. Seborrheic dermatitis      Plan:       Bhakti was seen today for back pain.    Diagnoses and all orders for this visit:    Chronic right-sided thoracic back pain  -     methylPREDNISolone (MEDROL DOSEPACK) 4 mg tablet; use as directed  -     ketoconazole (NIZORAL) 2 % shampoo; Apply topically twice a week.    Seborrheic dermatitis  -     ketoconazole (NIZORAL) 2 % shampoo; Apply topically twice a week.  -     Ambulatory referral/consult to Dermatology; Future    Follow up if symptoms worsen or fail to improve.    Hailey Allan MD  [facility]  Office: [phone]   Fax: [phone]  11/17/2022      Disclaimer: This note was partly generated using dictation software which may occasionally result in transcription errors.    "

## 2022-11-25 DIAGNOSIS — H93.19 TINNITUS, UNSPECIFIED LATERALITY: Primary | ICD-10-CM

## 2022-11-29 ENCOUNTER — OFFICE VISIT (OUTPATIENT)
Dept: OTOLARYNGOLOGY | Facility: CLINIC | Age: 87
End: 2022-11-29
Payer: MEDICARE

## 2022-11-29 ENCOUNTER — LAB VISIT (OUTPATIENT)
Dept: LAB | Facility: HOSPITAL | Age: 87
End: 2022-11-29
Attending: INTERNAL MEDICINE
Payer: MEDICARE

## 2022-11-29 ENCOUNTER — CLINICAL SUPPORT (OUTPATIENT)
Dept: AUDIOLOGY | Facility: CLINIC | Age: 87
End: 2022-11-29
Payer: MEDICARE

## 2022-11-29 VITALS — BODY MASS INDEX: 27.34 KG/M2 | HEIGHT: 61 IN | WEIGHT: 144.81 LBS

## 2022-11-29 DIAGNOSIS — H81.12 BPPV (BENIGN PAROXYSMAL POSITIONAL VERTIGO), LEFT: Primary | ICD-10-CM

## 2022-11-29 DIAGNOSIS — H93.19 TINNITUS, UNSPECIFIED LATERALITY: ICD-10-CM

## 2022-11-29 DIAGNOSIS — H90.3 BILATERAL SENSORINEURAL HEARING LOSS: Primary | ICD-10-CM

## 2022-11-29 DIAGNOSIS — H91.93 BILATERAL HEARING LOSS, UNSPECIFIED HEARING LOSS TYPE: ICD-10-CM

## 2022-11-29 DIAGNOSIS — E03.9 ACQUIRED HYPOTHYROIDISM: ICD-10-CM

## 2022-11-29 DIAGNOSIS — R42 VERTIGO: ICD-10-CM

## 2022-11-29 LAB
T4 FREE SERPL-MCNC: 1.96 NG/DL (ref 0.71–1.51)
TSH SERPL DL<=0.005 MIU/L-ACNC: 1.16 UIU/ML (ref 0.4–4)

## 2022-11-29 PROCEDURE — 1160F PR REVIEW ALL MEDS BY PRESCRIBER/CLIN PHARMACIST DOCUMENTED: ICD-10-PCS | Mod: CPTII,S$GLB,, | Performed by: OTOLARYNGOLOGY

## 2022-11-29 PROCEDURE — 36415 COLL VENOUS BLD VENIPUNCTURE: CPT | Mod: PN | Performed by: INTERNAL MEDICINE

## 2022-11-29 PROCEDURE — 99999 PR PBB SHADOW E&M-EST. PATIENT-LVL IV: ICD-10-PCS | Mod: PBBFAC,,, | Performed by: OTOLARYNGOLOGY

## 2022-11-29 PROCEDURE — 1101F PT FALLS ASSESS-DOCD LE1/YR: CPT | Mod: CPTII,S$GLB,, | Performed by: OTOLARYNGOLOGY

## 2022-11-29 PROCEDURE — 95992 CANALITH REPOSITIONING PROC: CPT | Mod: S$GLB,,, | Performed by: OTOLARYNGOLOGY

## 2022-11-29 PROCEDURE — 1159F PR MEDICATION LIST DOCUMENTED IN MEDICAL RECORD: ICD-10-PCS | Mod: CPTII,S$GLB,, | Performed by: OTOLARYNGOLOGY

## 2022-11-29 PROCEDURE — 92557 PR COMPREHENSIVE HEARING TEST: ICD-10-PCS | Mod: S$GLB,,, | Performed by: AUDIOLOGIST-HEARING AID FITTER

## 2022-11-29 PROCEDURE — 3288F PR FALLS RISK ASSESSMENT DOCUMENTED: ICD-10-PCS | Mod: CPTII,S$GLB,, | Performed by: OTOLARYNGOLOGY

## 2022-11-29 PROCEDURE — 99999 PR PBB SHADOW E&M-EST. PATIENT-LVL I: CPT | Mod: PBBFAC,,,

## 2022-11-29 PROCEDURE — 1159F MED LIST DOCD IN RCRD: CPT | Mod: CPTII,S$GLB,, | Performed by: OTOLARYNGOLOGY

## 2022-11-29 PROCEDURE — 92567 PR TYMPA2METRY: ICD-10-PCS | Mod: S$GLB,,, | Performed by: AUDIOLOGIST-HEARING AID FITTER

## 2022-11-29 PROCEDURE — 92557 COMPREHENSIVE HEARING TEST: CPT | Mod: S$GLB,,, | Performed by: AUDIOLOGIST-HEARING AID FITTER

## 2022-11-29 PROCEDURE — 95992 PR CANALITH REPOSITIONING PROCEDURE, PER DAY: ICD-10-PCS | Mod: S$GLB,,, | Performed by: OTOLARYNGOLOGY

## 2022-11-29 PROCEDURE — 99203 OFFICE O/P NEW LOW 30 MIN: CPT | Mod: 25,S$GLB,, | Performed by: OTOLARYNGOLOGY

## 2022-11-29 PROCEDURE — 1160F RVW MEDS BY RX/DR IN RCRD: CPT | Mod: CPTII,S$GLB,, | Performed by: OTOLARYNGOLOGY

## 2022-11-29 PROCEDURE — 1101F PR PT FALLS ASSESS DOC 0-1 FALLS W/OUT INJ PAST YR: ICD-10-PCS | Mod: CPTII,S$GLB,, | Performed by: OTOLARYNGOLOGY

## 2022-11-29 PROCEDURE — 99999 PR PBB SHADOW E&M-EST. PATIENT-LVL IV: CPT | Mod: PBBFAC,,, | Performed by: OTOLARYNGOLOGY

## 2022-11-29 PROCEDURE — 1126F PR PAIN SEVERITY QUANTIFIED, NO PAIN PRESENT: ICD-10-PCS | Mod: CPTII,S$GLB,, | Performed by: OTOLARYNGOLOGY

## 2022-11-29 PROCEDURE — 92567 TYMPANOMETRY: CPT | Mod: S$GLB,,, | Performed by: AUDIOLOGIST-HEARING AID FITTER

## 2022-11-29 PROCEDURE — 84439 ASSAY OF FREE THYROXINE: CPT | Performed by: INTERNAL MEDICINE

## 2022-11-29 PROCEDURE — 99203 PR OFFICE/OUTPT VISIT, NEW, LEVL III, 30-44 MIN: ICD-10-PCS | Mod: 25,S$GLB,, | Performed by: OTOLARYNGOLOGY

## 2022-11-29 PROCEDURE — 99999 PR PBB SHADOW E&M-EST. PATIENT-LVL I: ICD-10-PCS | Mod: PBBFAC,,,

## 2022-11-29 PROCEDURE — 84443 ASSAY THYROID STIM HORMONE: CPT | Performed by: INTERNAL MEDICINE

## 2022-11-29 PROCEDURE — 3288F FALL RISK ASSESSMENT DOCD: CPT | Mod: CPTII,S$GLB,, | Performed by: OTOLARYNGOLOGY

## 2022-11-29 PROCEDURE — 1126F AMNT PAIN NOTED NONE PRSNT: CPT | Mod: CPTII,S$GLB,, | Performed by: OTOLARYNGOLOGY

## 2022-11-29 NOTE — PROGRESS NOTES
Bhakti Phipps was seen 11/29/2022 for an audiological evaluation. Pertinent complaints today include hearing loss and tinnitus AU. Pt denies family Hx of HL and loud noise exposure. Otoscopy revealed no cerumen in both ears. The tympanic membrane was visualized AU prior to proceeding with the hearing testing.     Results reveal a bilateral  moderate sloping to profound sensorineural hearing loss.    Speech Reception Thresholds were  40 dBHL for the right ear and 35 dBHL for the left ear.    Word recognition scores were good for the right ear and good for the left ear.   Tympanograms were Type A for the right ear and Type A for the left ear.    Audiogram results were reviewed in detail with patient and all questions were answered. Results will be reviewed by the referring provider at the completion of this note. Recommend binaural amplification pending medical clearance, repeat hearing testing in one year due to tinnitus and bilateral hearing protection with either muffs or in-ear protection in loud noises.

## 2022-11-29 NOTE — PROGRESS NOTES
Subjective:       Patient ID: Bhakti Phipps is a 88 y.o. female.    Chief Complaint: Hearing Loss and Dizziness    Bhakti is here for hearing loss and dizziness.  Hearing loss has has been going on for years.  Dizziness began months ago and occurs primarily with movement.  No fluctuations in hearing.     Patient validated questionnaires (if applicable):      %       No flowsheet data found.  No flowsheet data found.  No flowsheet data found.         Social History     Tobacco Use   Smoking Status Former    Types: Cigarettes    Quit date: 1975    Years since quittin.5   Smokeless Tobacco Never     Social History     Substance and Sexual Activity   Alcohol Use No          Objective:        Constitutional:   She is oriented to person, place, and time. She appears well-developed and well-nourished. She appears alert. She is active. Normal speech.      Head:  Normocephalic and atraumatic. Head is without TMJ tenderness. No scars. Salivary glands normal.  Facial strength is normal.      Ears:    Right Ear: No drainage or swelling. No middle ear effusion.   Left Ear: No drainage or swelling.  No middle ear effusion.   Right [place]-Hallpike - No vertigo, No  rotary nystagmus  Left [place]-Hallpike - Yes -   vertigo, Yes -  rotary nystagmus        Nose:  No mucosal edema, rhinorrhea or sinus tenderness. No turbinate hypertrophy.      Mouth/Throat  Oropharynx clear and moist without lesions or asymmetry, normal uvula midline and mirror exam normal. Normal dentition. No uvula swelling, lacerations or trismus. No oropharyngeal exudate. Tonsillar erythema, tonsillar exudate.      Neck:  Full range of motion with neck supple and no adenopathy. Thyroid tenderness is present. No tracheal deviation, no edema, no erythema, normal range of motion, no stridor, no crepitus and no neck rigidity present. No thyroid mass present.     Cardiovascular:    Intact distal pulses and normal pulses.              Pulmonary/Chest:   Effort  normal and breath sounds normal. No stridor.     Psychiatric:   Her speech is normal and behavior is normal. Her mood appears not anxious. Her affect is not labile.     Neurological:   She is alert and oriented to person, place, and time. No sensory deficit.     Skin:   No abrasions, lacerations, lesions, or rashes. No abrasion and no bruising noted.       Tests / Results:  Patient: Bhakti Phipps [id number], :1934  Procedure date:2022  Patient's medications, allergies, past medical, surgical, social and family histories were reviewed and updated as appropriate.  Chief Complaint:  Hearing Loss and Dizziness    HPI:  Bhakti is a 88 y.o. female with benign paroxysmal positional vertigo (BPPV) refractory to medical therapy. Hallpike-Elko maneuver demonstrates nystagmus of delayed onset that fatigues, rotary, clockwise, associated with vertigo.    Procedure: Risks, benefits, and alternatives of the procedure were discussed with the patient, and the patient consented to the Epley maneuver or canalith repositioning procedure (CRP).      With the patient sitting upright on the examination table, the head was lowered to the supine position and the neck rotated 45 degrees to the left side; once nystagmus fatigued, the body and head were slowly rotated to the opposite side 90 degrees, and then after 30-60 seconds the rotation continued another 90 degrees (they rolled onto their shoulder and were looking downwards at a 45 degree angle). After the nystagmus resolved, the patient was gently allowed to sit up with neck flexion.    There were no complications and the patient tolerated it well.  Post-procedure instructions were given.    Jose M Felton performed the entire procedure.      Assessment:       1. Vertigo    2. Bilateral hearing loss, unspecified hearing loss type            Plan:         Discussed SNHL. HA candidate    Epley today for L BPPV. RTC or call if persistent symptoms

## 2022-11-29 NOTE — PROGRESS NOTES
Subjective:       Patient ID: Bhakti Phipps is a 88 y.o. female.    Chief Complaint: Hearing Loss and Dizziness    Bhakti is here for hearing loss and dizziness.   Length of symptoms: years.  Dizziness began maybe months ago, assoc with head movement. Brief in nature.     Patient validated questionnaires (if applicable):      %       No flowsheet data found.  No flowsheet data found.  No flowsheet data found.         Social History     Tobacco Use   Smoking Status Former    Types: Cigarettes    Quit date: 1975    Years since quittin.5   Smokeless Tobacco Never     Social History     Substance and Sexual Activity   Alcohol Use No          Objective:      Physical Exam      Tests / Results:  ***    Assessment:       1. Vertigo    2. Bilateral hearing loss, unspecified hearing loss type            Plan:         ***         Please follow up with your primary care doctor within 1 week.     Take Motrin 400-600mg every 6 hrs as needed for pain. Take with food   Take Tylenol 650mg every 6 hrs as needed for pain.   You may also use lidocaine patches which you can purchase OTC.    Return to ED for worsening pain, loss of sensation to your legs, urinary/fecal incontinence, fever, chills, or any other concerns.

## 2022-11-29 NOTE — PATIENT INSTRUCTIONS
"Benign Paroxysmal Positional Vertigo  Benign paroxysmal positional vertigo (BPPV) is a problem with the inner ear. The inner ear contains the vestibular system (balance organ). BPPV causes a feeling of spinning. It is a common problem of the vestibular system.    Understanding BPPV  The vestibular system (balance organ) of the ear is made up of very tiny parts. They include the utricle, saccule, and semicircular canals. The utricle is a tiny organ that contains calcium crystals. In some people, the crystals can move into the semicircular canals. When this happens, the system no longer works as it should. This causes BPPV.     What causes BPPV?  Causes include injury to your head or neck, other balance disorders; but for most, the cause is unknown.    Symptoms of BPPV  You many have repeated feelings of spinning (vertigo). The vertigo usually lasts less than 1 minute. Some movements, suchas rolling over in bed, can bring on vertigo.    Treatment for BPPV  Your health care provider may try to move the calcium crystals. This is done by having you move your head and neck in certain ways. This treatment is safe and often works well. You may also be told to do these movements at home. You may still have vertigo for a few weeks. Your health care provider will recheck your symptoms, usually in a few weeks. Special physical therapy may also be part of treatment.  In rare cases surgery may be needed for BPPV that does not go away.    INSTRUCTIONS FOR PATIENTS AFTER OFFICE TREATMENTS     1. Wait until you are not feeling dizzy / nauseated before you go home.     2. In general, there are no restrictions following repositioning of the crystals. You will want to avoid rapid movements of your head or extreme position changes in your head for next day or two. Sleep on an extra pillow for the 1 night. You may want to avoid sleeping on the "bad" side for a few days.    3. Following a few days, you can resume normal activities. It is " normal to have some dizziness for a few days - weeks following the procedure, but it should generally be improving / improved by that point.     If you continue to have vertigo, you may be instructed to perform some exercises at home. These can be easily found on YouTube. Be sure to perform the exercise for the SIDE OF THE AFFECTED EAR.     Exercises include:  Epley Maneuver  Semont Maneuver  Brothers-Daroff Maneuver

## 2022-12-14 DIAGNOSIS — I10 PRIMARY HYPERTENSION: ICD-10-CM

## 2022-12-14 RX ORDER — HYDROCHLOROTHIAZIDE 12.5 MG/1
TABLET ORAL
Qty: 90 TABLET | Refills: 3 | Status: SHIPPED | OUTPATIENT
Start: 2022-12-14 | End: 2023-12-19

## 2022-12-14 NOTE — TELEPHONE ENCOUNTER
Refill Decision Note   Bhakti Desire  is requesting a refill authorization.  Brief Assessment and Rationale for Refill:  Approve     Medication Therapy Plan:       Medication Reconciliation Completed: No   Comments:     No Care Gaps recommended.     Note composed:4:51 PM 12/14/2022

## 2022-12-14 NOTE — TELEPHONE ENCOUNTER
No new care gaps identified.  [facility] Embedded Care Gaps. Reference number: [id number]. 12/14/2022   2:57:37 AM CST

## 2022-12-28 ENCOUNTER — PATIENT MESSAGE (OUTPATIENT)
Dept: RHEUMATOLOGY | Facility: CLINIC | Age: 87
End: 2022-12-28
Payer: MEDICARE

## 2022-12-28 DIAGNOSIS — Z51.81 ENCOUNTER FOR MONITORING CHRONIC NSAID THERAPY: ICD-10-CM

## 2022-12-28 DIAGNOSIS — M13.0 POLYARTHRITIS: Primary | ICD-10-CM

## 2022-12-28 DIAGNOSIS — Z79.1 ENCOUNTER FOR MONITORING CHRONIC NSAID THERAPY: ICD-10-CM

## 2022-12-28 DIAGNOSIS — M15.9 PRIMARY OSTEOARTHRITIS INVOLVING MULTIPLE JOINTS: ICD-10-CM

## 2022-12-28 DIAGNOSIS — E03.4 HYPOTHYROIDISM DUE TO ACQUIRED ATROPHY OF THYROID: ICD-10-CM

## 2022-12-30 ENCOUNTER — PATIENT MESSAGE (OUTPATIENT)
Dept: RHEUMATOLOGY | Facility: CLINIC | Age: 87
End: 2022-12-30
Payer: MEDICARE

## 2023-01-05 ENCOUNTER — OFFICE VISIT (OUTPATIENT)
Dept: RHEUMATOLOGY | Facility: CLINIC | Age: 88
End: 2023-01-05
Payer: MEDICARE

## 2023-01-05 VITALS
SYSTOLIC BLOOD PRESSURE: 150 MMHG | HEART RATE: 77 BPM | DIASTOLIC BLOOD PRESSURE: 63 MMHG | BODY MASS INDEX: 26.93 KG/M2 | HEIGHT: 61 IN | WEIGHT: 142.63 LBS

## 2023-01-05 DIAGNOSIS — Z79.1 ENCOUNTER FOR MONITORING CHRONIC NSAID THERAPY: ICD-10-CM

## 2023-01-05 DIAGNOSIS — Z51.81 ENCOUNTER FOR MONITORING CHRONIC NSAID THERAPY: ICD-10-CM

## 2023-01-05 DIAGNOSIS — M15.9 PRIMARY OSTEOARTHRITIS INVOLVING MULTIPLE JOINTS: Primary | ICD-10-CM

## 2023-01-05 PROCEDURE — 99999 PR PBB SHADOW E&M-EST. PATIENT-LVL III: CPT | Mod: PBBFAC,HCNC,, | Performed by: INTERNAL MEDICINE

## 2023-01-05 PROCEDURE — 99214 PR OFFICE/OUTPT VISIT, EST, LEVL IV, 30-39 MIN: ICD-10-PCS | Mod: HCNC,S$GLB,, | Performed by: INTERNAL MEDICINE

## 2023-01-05 PROCEDURE — 1159F MED LIST DOCD IN RCRD: CPT | Mod: HCNC,CPTII,S$GLB, | Performed by: INTERNAL MEDICINE

## 2023-01-05 PROCEDURE — 1126F PR PAIN SEVERITY QUANTIFIED, NO PAIN PRESENT: ICD-10-PCS | Mod: HCNC,CPTII,S$GLB, | Performed by: INTERNAL MEDICINE

## 2023-01-05 PROCEDURE — 99214 OFFICE O/P EST MOD 30 MIN: CPT | Mod: HCNC,S$GLB,, | Performed by: INTERNAL MEDICINE

## 2023-01-05 PROCEDURE — 3288F PR FALLS RISK ASSESSMENT DOCUMENTED: ICD-10-PCS | Mod: HCNC,CPTII,S$GLB, | Performed by: INTERNAL MEDICINE

## 2023-01-05 PROCEDURE — 3288F FALL RISK ASSESSMENT DOCD: CPT | Mod: HCNC,CPTII,S$GLB, | Performed by: INTERNAL MEDICINE

## 2023-01-05 PROCEDURE — 1126F AMNT PAIN NOTED NONE PRSNT: CPT | Mod: HCNC,CPTII,S$GLB, | Performed by: INTERNAL MEDICINE

## 2023-01-05 PROCEDURE — 99999 PR PBB SHADOW E&M-EST. PATIENT-LVL III: ICD-10-PCS | Mod: PBBFAC,HCNC,, | Performed by: INTERNAL MEDICINE

## 2023-01-05 PROCEDURE — 1101F PT FALLS ASSESS-DOCD LE1/YR: CPT | Mod: HCNC,CPTII,S$GLB, | Performed by: INTERNAL MEDICINE

## 2023-01-05 PROCEDURE — 1101F PR PT FALLS ASSESS DOC 0-1 FALLS W/OUT INJ PAST YR: ICD-10-PCS | Mod: HCNC,CPTII,S$GLB, | Performed by: INTERNAL MEDICINE

## 2023-01-05 PROCEDURE — 1159F PR MEDICATION LIST DOCUMENTED IN MEDICAL RECORD: ICD-10-PCS | Mod: HCNC,CPTII,S$GLB, | Performed by: INTERNAL MEDICINE

## 2023-01-05 RX ORDER — ESTRADIOL 0.1 MG/G
CREAM VAGINAL
COMMUNITY
Start: 2022-09-11 | End: 2023-07-31

## 2023-01-05 RX ORDER — NABUMETONE 500 MG/1
500 TABLET, FILM COATED ORAL DAILY
Qty: 90 TABLET | Refills: 3 | Status: SHIPPED | OUTPATIENT
Start: 2023-01-05 | End: 2023-05-04 | Stop reason: SDUPTHER

## 2023-01-05 NOTE — PROGRESS NOTES
Subjective:          Chief Complaint: Bhakti Phipps is a 88 y.o. female who had concerns including Disease Management.    HPI:    Patient is an 88-year-old female she was referred by her orthopedic surgeon Dr. 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  Today there is no significant serologies that were sent.  She was given nonsteroidal anti-inflammatory (relafen) in the interim to this evaluation.  She also received injections within the 3rd MCP with Celestone own and bupivacaine 11/23/2021. Patient states the right 3rd MCP is first joint with severe swelling. She notes pain in various joints MCP and PIP mostly w/ left 1st IP deformed from injury with dog bite.     She denies morning stiffness, notes worse with use. No pain with rest and much better with Relafen 500mg BID. No Gi upset. Patient's last renal function shows a creatinine that is within normal limits and a GFR 57.   Patient notes left sided LBP, s/p left TKA (2006) this has done very well. She denies PUD, GI bleed or renal disease.     Imaging studies which I cannot review but were included in Dr. Mitchell's notes show AP lateral oblique images of the right hand with significant arthritic changes particularly noticed in the right MCP joints.  Denies  symptoms of Raynaud's, fever, chills , dry mouth, dry eyes, dysphagia, tight  skin, signs of pleurisy , pericarditis, rashes/photosensitivity, oral ulcers,  chest pain, shortness of breath, GI complaints/IBD,  urinary complaints or hx of proteinura/nephritis,  alopecia , fatigue, photosensitivity, headache, change in vision, face & jaw pain, ,psoriasis, numbness, anxiety, abdomen pain, nausea, vomiting. No hx of DVT, PE, miscarriages where applicable.    Patient with macular degeneration receives injections for this.   + Sulfa allergy.   REVIEW OF SYSTEMS:    Review of Systems   Constitutional:  Negative for fever.   Eyes:   Positive for redness.   Respiratory:  Negative for cough and shortness of breath.    Cardiovascular:  Negative for chest pain.   Gastrointestinal:  Negative for constipation and diarrhea.   Genitourinary:  Negative for dysuria.   Musculoskeletal:  Positive for back pain and joint pain.   Skin:  Negative for rash.   Neurological:  Negative for headaches.   Endo/Heme/Allergies:  Does not bruise/bleed easily.             Objective:            Past Medical History:   Diagnosis Date    Anxiety     Arthritis     Basal cell carcinoma of skin of other and unspecified parts of face 2012    Dx updated per  IMO Load    Essential hypertension 2012    History of melanoma     at age of 26    Hyperlipidemia 11/10/2018    Hypertension     Hypothyroidism due to acquired atrophy of thyroid 2012    Idiopathic scoliosis of thoracolumbar spine 2019    Insomnia 2014    Intermediate stage nonexudative age-related macular degeneration of both eyes 2018    Melanoma     age 26    Osteoarthritis 2012    Postmenopausal 2019    Stress incontinence 2018    Tortuous aorta 2015    Tremor      Family History   Problem Relation Age of Onset    Pancreatic cancer Mother     Leukemia Father     Lung cancer Brother     Liver cancer Sister      Social History     Tobacco Use    Smoking status: Former     Types: Cigarettes     Quit date: 1975     Years since quittin.6    Smokeless tobacco: Never   Substance Use Topics    Alcohol use: No    Drug use: No         Current Outpatient Medications on File Prior to Visit   Medication Sig Dispense Refill    b complex vitamins tablet Take 1 tablet by mouth once daily.      bisacodyL 5 mg Tab 1-3 tablet DAILY (route: oral)      cholecalciferol, vitamin D3, 100 mcg (4,000 unit) Cap capsule Take by mouth once daily.      estradioL (ESTRACE) 0.01 % (0.1 mg/gram) vaginal cream       hydroCHLOROthiazide (HYDRODIURIL) 12.5 MG Tab TAKE 1 TABLET BY  MOUTH ONCE DAILY. 90 tablet 3    levothyroxine (SYNTHROID) 125 MCG tablet TAKE 1 TABLET EVERY DAY 90 tablet 0    magnesium oxide 500 mg Tab Take 1 tablet by mouth once daily.      methylPREDNISolone (MEDROL DOSEPACK) 4 mg tablet use as directed 1 each 0    nabumetone (RELAFEN) 500 MG tablet Take 1 tablet (500 mg total) by mouth once daily. 90 tablet 3    propranoloL (INDERAL) 20 MG tablet TAKE 1 TABLET TWICE DAILY 180 tablet 1    temazepam (RESTORIL) 7.5 MG Cap Take 1 capsule (7.5 mg total) by mouth nightly as needed (Insomnia). 30 capsule 0    venlafaxine (EFFEXOR-XR) 75 MG 24 hr capsule TAKE 1 CAPSULE EVERY DAY 90 capsule 2    VIT C/E/ZN/COPPR/LUTEIN/ZEAXAN (PRESERVISION AREDS 2 ORAL) Take by mouth.      ketoconazole (NIZORAL) 2 % shampoo Apply topically twice a week. 120 mL 1    meclizine (ANTIVERT) 12.5 mg tablet Take 1 tablet (12.5 mg total) by mouth 3 (three) times daily as needed for Nausea or Dizziness. (Patient not taking: Reported on 11/29/2022) 30 tablet 0     No current facility-administered medications on file prior to visit.       Vitals:    01/05/23 1319   BP: (!) 150/63   Pulse: 77       Physical Exam:    Physical Exam  Constitutional:       Appearance: She is well-developed.   HENT:      Head: Normocephalic and atraumatic.   Eyes:      Pupils: Pupils are equal, round, and reactive to light.   Cardiovascular:      Rate and Rhythm: Normal rate and regular rhythm.      Heart sounds: Normal heart sounds.   Pulmonary:      Effort: Pulmonary effort is normal.      Breath sounds: Normal breath sounds.   Musculoskeletal:      Right shoulder: No swelling or tenderness. Normal range of motion.      Left shoulder: No swelling or tenderness. Normal range of motion.      Right elbow: No swelling. Normal range of motion. No tenderness.      Left elbow: No swelling. Normal range of motion. No tenderness.      Right wrist: No swelling or tenderness. Decreased range of motion.      Left wrist: No swelling or  tenderness. Decreased range of motion.      Right hand: Deformity and tenderness present. No swelling. Decreased range of motion.      Left hand: Deformity and tenderness present. No swelling. Decreased range of motion.      Cervical back: Normal range of motion.      Right knee: No swelling. Normal range of motion. No tenderness.      Left knee: No swelling. Normal range of motion. No tenderness.      Right foot: Normal range of motion. No swelling or tenderness.      Left foot: Normal range of motion. No swelling or tenderness.      Comments: Bony hypertrophy with some deformity of the PIP and DIP joints, no swelling or tenderness at the MCP joints.        Skin:     General: Skin is warm and dry.   Neurological:      Mental Status: She is alert and oriented to person, place, and time.   Psychiatric:         Behavior: Behavior normal.       Rapid3 Question Responses and Scores 8/5/2022   MDHAQ Score 1.4   Psychologic Score 2.2   Pain Score 4   When you awakened in the morning OVER THE LAST WEEK, did you feel stiff? Yes   If Yes, please indicate the number of hours until you are as limber as you will be for the day 3   Fatigue Score 4   Global Health Score 4   RAPID3 Score 4.22     Rapid3 Question Responses and Scores 12/30/2022   MDHAQ Score 1.4   Psychologic Score 3.3   Pain Score 4.5   When you awakened in the morning OVER THE LAST WEEK, did you feel stiff? Yes   If Yes, please indicate the number of hours until you are as limber as you will be for the day 2   Fatigue Score 3   Global Health Score 6   RAPID3 Score 5.06           Assessment:       Encounter Diagnoses   Name Primary?    Primary osteoarthritis involving multiple joints Yes    Encounter for monitoring chronic NSAID therapy             Plan:        Primary osteoarthritis involving multiple joints  -     RENAL FUNCTION PANEL; Future; Expected date: 01/05/2023  -     CBC Auto Differential; Standing; Expected date: 01/05/2023    Encounter for monitoring  chronic NSAID therapy  -     RENAL FUNCTION PANEL; Future; Expected date: 01/05/2023  -     CBC Auto Differential; Standing; Expected date: 01/05/2023    Other orders  -     nabumetone (RELAFEN) 500 MG tablet; Take 1 tablet (500 mg total) by mouth once daily.  Dispense: 90 tablet; Refill: 3      Patient is an 87 y/o female with MCP, PIP and some DIP arthritis. She developed swelling of the MCP acutely and noted by hand surgeon to have significant MCP arthritis thusly referred to Rheumatology for eval of possible inflammatory arthritis.   No features of SLE, no Ps or IBD, will r/o RA     For now:  Labs and imaging  Decrease Relafen to 500mg ONCE daily   Use Tylenol 650mg- 1,000mg at night.     Discussed r/b/se for NSAID including renal, GI and other ASE      Follow up in about 4 months (around 5/5/2023).  F/u 4 months  30min consultation with greater than 50% spent in counseling, chart review and coordination of care. All questions answered.  Thank you for allowing me to participate in the care of this very pleasant patient.

## 2023-01-30 ENCOUNTER — TELEPHONE (OUTPATIENT)
Dept: FAMILY MEDICINE | Facility: CLINIC | Age: 88
End: 2023-01-30
Payer: MEDICARE

## 2023-01-30 NOTE — TELEPHONE ENCOUNTER
----- Message from Brenda Abbott sent at 1/30/2023 10:19 AM CST -----  Contact: avi champagne/Norma  Type:  RX Refill Request    Who Called:  Avi   Refill or New Rx:  new rx  RX Name and Strength:  lorazepam 1 mg  How is the patient currently taking it? (ex. 1XDay):  as directed   Is this a 30 day or 90 day RX:  90  Preferred Pharmacy with phone number:    [facility] - [place], [place] - [address]  [address] [address]  Phone: [phone] Fax: [phone]  Local or Mail Order:  mail order   Ordering Provider:  Dr Jose Mahmood Call Back Number:  [phone]  Additional Information:  thanks

## 2023-02-01 RX ORDER — LORAZEPAM 1 MG/1
TABLET ORAL
Refills: 0 | OUTPATIENT
Start: 2023-02-01

## 2023-02-01 NOTE — TELEPHONE ENCOUNTER
No new care gaps identified.  [facility] Embedded Care Gaps. Reference number: [id number]. 2/01/2023   2:07:22 AM CST

## 2023-02-07 DIAGNOSIS — Z00.00 ENCOUNTER FOR MEDICARE ANNUAL WELLNESS EXAM: ICD-10-CM

## 2023-02-09 DIAGNOSIS — Z00.00 ENCOUNTER FOR MEDICARE ANNUAL WELLNESS EXAM: ICD-10-CM

## 2023-04-14 ENCOUNTER — OFFICE VISIT (OUTPATIENT)
Dept: FAMILY MEDICINE | Facility: CLINIC | Age: 88
End: 2023-04-14
Payer: MEDICARE

## 2023-04-14 VITALS
HEIGHT: 61 IN | DIASTOLIC BLOOD PRESSURE: 56 MMHG | SYSTOLIC BLOOD PRESSURE: 108 MMHG | HEART RATE: 64 BPM | WEIGHT: 142 LBS | BODY MASS INDEX: 26.81 KG/M2 | RESPIRATION RATE: 14 BRPM

## 2023-04-14 DIAGNOSIS — I10 ESSENTIAL HYPERTENSION: Primary | ICD-10-CM

## 2023-04-14 DIAGNOSIS — H35.3220 EXUDATIVE AGE-RELATED MACULAR DEGENERATION OF LEFT EYE, UNSPECIFIED STAGE: ICD-10-CM

## 2023-04-14 DIAGNOSIS — F41.9 ANXIETY: ICD-10-CM

## 2023-04-14 DIAGNOSIS — E03.4 HYPOTHYROIDISM DUE TO ACQUIRED ATROPHY OF THYROID: ICD-10-CM

## 2023-04-14 DIAGNOSIS — G47.01 INSOMNIA DUE TO MEDICAL CONDITION: ICD-10-CM

## 2023-04-14 DIAGNOSIS — I77.1 TORTUOUS AORTA: ICD-10-CM

## 2023-04-14 PROCEDURE — 99214 PR OFFICE/OUTPT VISIT, EST, LEVL IV, 30-39 MIN: ICD-10-PCS | Mod: HCNC,S$GLB,, | Performed by: INTERNAL MEDICINE

## 2023-04-14 PROCEDURE — 3288F PR FALLS RISK ASSESSMENT DOCUMENTED: ICD-10-PCS | Mod: HCNC,CPTII,S$GLB, | Performed by: INTERNAL MEDICINE

## 2023-04-14 PROCEDURE — 1101F PT FALLS ASSESS-DOCD LE1/YR: CPT | Mod: HCNC,CPTII,S$GLB, | Performed by: INTERNAL MEDICINE

## 2023-04-14 PROCEDURE — 1126F AMNT PAIN NOTED NONE PRSNT: CPT | Mod: HCNC,CPTII,S$GLB, | Performed by: INTERNAL MEDICINE

## 2023-04-14 PROCEDURE — 99214 OFFICE O/P EST MOD 30 MIN: CPT | Mod: HCNC,S$GLB,, | Performed by: INTERNAL MEDICINE

## 2023-04-14 PROCEDURE — 1159F MED LIST DOCD IN RCRD: CPT | Mod: HCNC,CPTII,S$GLB, | Performed by: INTERNAL MEDICINE

## 2023-04-14 PROCEDURE — 1160F RVW MEDS BY RX/DR IN RCRD: CPT | Mod: HCNC,CPTII,S$GLB, | Performed by: INTERNAL MEDICINE

## 2023-04-14 PROCEDURE — 99999 PR PBB SHADOW E&M-EST. PATIENT-LVL IV: ICD-10-PCS | Mod: PBBFAC,HCNC,, | Performed by: INTERNAL MEDICINE

## 2023-04-14 PROCEDURE — 1160F PR REVIEW ALL MEDS BY PRESCRIBER/CLIN PHARMACIST DOCUMENTED: ICD-10-PCS | Mod: HCNC,CPTII,S$GLB, | Performed by: INTERNAL MEDICINE

## 2023-04-14 PROCEDURE — 99999 PR PBB SHADOW E&M-EST. PATIENT-LVL IV: CPT | Mod: PBBFAC,HCNC,, | Performed by: INTERNAL MEDICINE

## 2023-04-14 PROCEDURE — 1126F PR PAIN SEVERITY QUANTIFIED, NO PAIN PRESENT: ICD-10-PCS | Mod: HCNC,CPTII,S$GLB, | Performed by: INTERNAL MEDICINE

## 2023-04-14 PROCEDURE — 3288F FALL RISK ASSESSMENT DOCD: CPT | Mod: HCNC,CPTII,S$GLB, | Performed by: INTERNAL MEDICINE

## 2023-04-14 PROCEDURE — 1159F PR MEDICATION LIST DOCUMENTED IN MEDICAL RECORD: ICD-10-PCS | Mod: HCNC,CPTII,S$GLB, | Performed by: INTERNAL MEDICINE

## 2023-04-14 PROCEDURE — 1101F PR PT FALLS ASSESS DOC 0-1 FALLS W/OUT INJ PAST YR: ICD-10-PCS | Mod: HCNC,CPTII,S$GLB, | Performed by: INTERNAL MEDICINE

## 2023-04-14 RX ORDER — TEMAZEPAM 7.5 MG/1
7.5 CAPSULE ORAL NIGHTLY PRN
Qty: 30 CAPSULE | Refills: 0 | Status: SHIPPED | OUTPATIENT
Start: 2023-04-14 | End: 2023-12-11

## 2023-04-14 RX ORDER — VENLAFAXINE HYDROCHLORIDE 150 MG/1
150 CAPSULE, EXTENDED RELEASE ORAL DAILY
Qty: 90 CAPSULE | Refills: 1 | Status: SHIPPED | OUTPATIENT
Start: 2023-04-14 | End: 2023-09-10

## 2023-04-14 NOTE — PROGRESS NOTES
Assessment and Plan:    1. Essential hypertension  Borderline low today, but historically gets too high when we hold the HCTZ. Otherwise has not been low. Encouraged home monitoring. Encouraged hydration.    2. Anxiety  Will try increasing to 150 daily. Follow up in 3 months.  - venlafaxine (EFFEXOR-XR) 150 MG Cp24; Take 1 capsule (150 mg total) by mouth once daily.  Dispense: 90 capsule; Refill: 1    3. Insomnia due to medical condition  Discussed today the reasons why I want to limit how much of this medication is prescribed. She reports understanding this. While she would prefer to take this every day, we discussed that I do not want her to become habituated to this again. We have agreed to 30 pills every 3 months, and encouraged her not to use this more frequently.  - temazepam (RESTORIL) 7.5 MG Cap; Take 1 capsule (7.5 mg total) by mouth nightly as needed (Insomnia).  Dispense: 30 capsule; Refill: 0    4. Hypothyroidism due to acquired atrophy of thyroid  Continue levothyroxine.    5. Tortuous aorta  Noted on prior imaging. No known vascular disease. Not on statin in the past and not clear that adding one at age 88 would be beneficial, so will hold for now unless she develops clinical ASCVD.    6. Exudative age-related macular degeneration of left eye, unspecified stage  Continue follow up with Ophthalmologist.    ______________________________________________________________________  Subjective:    Chief Complaint:  Follow up chronic medical conditions.    HPI:  Bhakti is a 88 y.o. year old female here to follow up chronic medical conditions.     HTN- Taking HCTZ 12.5 and propranolol 20 mg BID. BP has been controlled. Propranolol also for tremor.     Insomnia- Uses temazepam rarely for insomnia. Reports that she has been needing this more lately. Melatonin was not effective.     Anxiety- Taking venlafaxine 75 mg daily. Does think it is at least partially anxiety that is preventing her from sleeping.      Hypothyroidism- Taking levothyroxine 125 mcg daily.    Sees Rheumatology for arthritis, no signs of inflammatory arthritis. Decreased relafen to 500 mg at the last visit.      Medications:  Current Outpatient Medications on File Prior to Visit   Medication Sig Dispense Refill    b complex vitamins tablet Take 1 tablet by mouth once daily.      bisacodyL 5 mg Tab 1-3 tablet DAILY (route: oral)      cholecalciferol, vitamin D3, 100 mcg (4,000 unit) Cap capsule Take by mouth once daily.      hydroCHLOROthiazide (HYDRODIURIL) 12.5 MG Tab TAKE 1 TABLET BY MOUTH ONCE DAILY. 90 tablet 3    levothyroxine (SYNTHROID) 125 MCG tablet TAKE 1 TABLET EVERY DAY 90 tablet 1    magnesium oxide 500 mg Tab Take 1 tablet by mouth once daily.      meclizine (ANTIVERT) 12.5 mg tablet Take 1 tablet (12.5 mg total) by mouth 3 (three) times daily as needed for Nausea or Dizziness. 30 tablet 0    nabumetone (RELAFEN) 500 MG tablet Take 1 tablet (500 mg total) by mouth once daily. 90 tablet 3    propranoloL (INDERAL) 20 MG tablet TAKE 1 TABLET TWICE DAILY 180 tablet 1    venlafaxine (EFFEXOR-XR) 75 MG 24 hr capsule TAKE 1 CAPSULE EVERY DAY 90 capsule 2    VIT C/E/ZN/COPPR/LUTEIN/ZEAXAN (PRESERVISION AREDS 2 ORAL) Take by mouth.      estradioL (ESTRACE) 0.01 % (0.1 mg/gram) vaginal cream       ketoconazole (NIZORAL) 2 % shampoo Apply topically twice a week. 120 mL 1    methylPREDNISolone (MEDROL DOSEPACK) 4 mg tablet use as directed 1 each 0    temazepam (RESTORIL) 7.5 MG Cap TAKE 1 CAPSULE BY MOUTH NIGHTLY AS NEEDED FOR INSOMNIA (Patient not taking: Reported on 4/14/2023) 30 capsule 0     No current facility-administered medications on file prior to visit.       Review of Systems:  Review of Systems   Constitutional:  Negative for activity change and unexpected weight change.   HENT:  Positive for hearing loss. Negative for rhinorrhea and trouble swallowing.    Eyes:  Negative for discharge and visual disturbance.   Respiratory:  Negative  "for chest tightness and wheezing.    Cardiovascular:  Negative for chest pain and palpitations.   Gastrointestinal:  Negative for blood in stool, constipation, diarrhea and vomiting.   Endocrine: Negative for polydipsia and polyuria.   Genitourinary:  Negative for difficulty urinating, dysuria, hematuria and menstrual problem.   Musculoskeletal:  Negative for arthralgias, joint swelling and neck pain.   Neurological:  Negative for weakness and headaches.   Psychiatric/Behavioral:  Positive for sleep disturbance. Negative for confusion and dysphoric mood. The patient is nervous/anxious.      Past Medical History:  Past Medical History:   Diagnosis Date    Anxiety     Arthritis     Basal cell carcinoma of skin of other and unspecified parts of face 11/21/2012    Dx updated per 2019 IMO Load    Essential hypertension 05/21/2012    History of melanoma     at age of 26    Hyperlipidemia 11/10/2018    Hypertension     Hypothyroidism due to acquired atrophy of thyroid 05/21/2012    Idiopathic scoliosis of thoracolumbar spine 02/28/2019    Insomnia 02/25/2014    Intermediate stage nonexudative age-related macular degeneration of both eyes 04/12/2018    Melanoma     age 26    Osteoarthritis 05/21/2012    Postmenopausal 08/13/2019    Stress incontinence 04/12/2018    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    Tremor        Objective:    Vitals:  Vitals:    04/14/23 1024   BP: (!) 108/56   Pulse: 64   Resp: 14   Weight: 64.4 kg (141 lb 15.6 oz)   Height: 5' 1" (1.549 m)   PainSc: 0-No pain       Physical Exam  Vitals reviewed.   Constitutional:       General: She is not in acute distress.     Appearance: She is well-developed.   HENT:      Right Ear: Tympanic membrane and ear canal normal.      Left Ear: Tympanic membrane and ear canal normal.   Eyes:      General: No scleral icterus.  Cardiovascular:      Rate and Rhythm: Normal rate and regular rhythm.      Heart sounds: No murmur heard.  Pulmonary:      Effort: Pulmonary effort is " normal. No respiratory distress.      Breath sounds: Normal breath sounds. No wheezing, rhonchi or rales.   Musculoskeletal:      Right lower leg: No edema.      Left lower leg: No edema.   Skin:     General: Skin is warm and dry.   Neurological:      Mental Status: She is alert and oriented to person, place, and time.   Psychiatric:         Behavior: Behavior normal.       Data:  CMP unremarkable in Jan    PDMP reviewed, last filled temazepam 2/16/23, 10/19/22    Zoë Garcia MD  Internal Medicine

## 2023-05-04 ENCOUNTER — OFFICE VISIT (OUTPATIENT)
Dept: RHEUMATOLOGY | Facility: CLINIC | Age: 88
End: 2023-05-04
Payer: MEDICARE

## 2023-05-04 VITALS
WEIGHT: 138.56 LBS | HEART RATE: 74 BPM | HEIGHT: 61 IN | BODY MASS INDEX: 26.16 KG/M2 | SYSTOLIC BLOOD PRESSURE: 136 MMHG | DIASTOLIC BLOOD PRESSURE: 64 MMHG

## 2023-05-04 DIAGNOSIS — Z79.1 ENCOUNTER FOR LONG-TERM (CURRENT) USE OF NSAIDS: ICD-10-CM

## 2023-05-04 DIAGNOSIS — D64.89 ANEMIA DUE TO OTHER CAUSE, NOT CLASSIFIED: ICD-10-CM

## 2023-05-04 DIAGNOSIS — M15.9 PRIMARY OSTEOARTHRITIS INVOLVING MULTIPLE JOINTS: Primary | ICD-10-CM

## 2023-05-04 PROCEDURE — 1159F MED LIST DOCD IN RCRD: CPT | Mod: HCNC,CPTII,S$GLB, | Performed by: INTERNAL MEDICINE

## 2023-05-04 PROCEDURE — 3288F FALL RISK ASSESSMENT DOCD: CPT | Mod: HCNC,CPTII,S$GLB, | Performed by: INTERNAL MEDICINE

## 2023-05-04 PROCEDURE — 1101F PR PT FALLS ASSESS DOC 0-1 FALLS W/OUT INJ PAST YR: ICD-10-PCS | Mod: HCNC,CPTII,S$GLB, | Performed by: INTERNAL MEDICINE

## 2023-05-04 PROCEDURE — 99999 PR PBB SHADOW E&M-EST. PATIENT-LVL III: CPT | Mod: PBBFAC,HCNC,, | Performed by: INTERNAL MEDICINE

## 2023-05-04 PROCEDURE — 99214 OFFICE O/P EST MOD 30 MIN: CPT | Mod: HCNC,S$GLB,, | Performed by: INTERNAL MEDICINE

## 2023-05-04 PROCEDURE — 1101F PT FALLS ASSESS-DOCD LE1/YR: CPT | Mod: HCNC,CPTII,S$GLB, | Performed by: INTERNAL MEDICINE

## 2023-05-04 PROCEDURE — 1126F AMNT PAIN NOTED NONE PRSNT: CPT | Mod: HCNC,CPTII,S$GLB, | Performed by: INTERNAL MEDICINE

## 2023-05-04 PROCEDURE — 99214 PR OFFICE/OUTPT VISIT, EST, LEVL IV, 30-39 MIN: ICD-10-PCS | Mod: HCNC,S$GLB,, | Performed by: INTERNAL MEDICINE

## 2023-05-04 PROCEDURE — 1159F PR MEDICATION LIST DOCUMENTED IN MEDICAL RECORD: ICD-10-PCS | Mod: HCNC,CPTII,S$GLB, | Performed by: INTERNAL MEDICINE

## 2023-05-04 PROCEDURE — 3288F PR FALLS RISK ASSESSMENT DOCUMENTED: ICD-10-PCS | Mod: HCNC,CPTII,S$GLB, | Performed by: INTERNAL MEDICINE

## 2023-05-04 PROCEDURE — 99999 PR PBB SHADOW E&M-EST. PATIENT-LVL III: ICD-10-PCS | Mod: PBBFAC,HCNC,, | Performed by: INTERNAL MEDICINE

## 2023-05-04 PROCEDURE — 1126F PR PAIN SEVERITY QUANTIFIED, NO PAIN PRESENT: ICD-10-PCS | Mod: HCNC,CPTII,S$GLB, | Performed by: INTERNAL MEDICINE

## 2023-05-04 RX ORDER — NAPROXEN 500 MG/1
TABLET ORAL
COMMUNITY
Start: 2023-03-05 | End: 2023-05-04

## 2023-05-04 RX ORDER — NABUMETONE 500 MG/1
500 TABLET, FILM COATED ORAL DAILY
Qty: 90 TABLET | Refills: 3 | Status: SHIPPED | OUTPATIENT
Start: 2023-05-04 | End: 2023-11-03

## 2023-05-04 NOTE — PROGRESS NOTES
Subjective:          Chief Complaint: Bhakti Phipps is a 88 y.o. female who had concerns including Disease Management.    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     Patient is an 88-year-old female she was referred by Dr. Li to me first seen 8/20222 due to arthritis and concern for possible inflammatory arthritis. She also received injections within the 3rd MCP with Celestone own and bupivacaine 11/23/2021. Patient states the right 3rd MCP is first joint with severe swelling. She notes pain in various joints MCP and PIP mostly w/ left 1st IP deformed from injury with dog bite.   Serologies negative.   Inflammatory markers normal    She denies morning stiffness, notes worse with use. No pain with rest and much better with Relafen 500mg BID. No Gi upset. Patient's last renal function shows a creatinine that is within normal limits and a GFR 57.   Patient notes left sided LBP, s/p left TKA (2006) this has done very well. She denies PUD, GI bleed or renal disease.     Imaging studies which I cannot review but were included in Dr. Mitchell's notes show AP lateral oblique images of the right hand with significant arthritic changes particularly noticed in the right MCP joints.    Denies  symptoms of Raynaud's, fever, chills , dry mouth, dry eyes, dysphagia, tight  skin, signs of pleurisy , pericarditis, rashes/photosensitivity, oral ulcers,  chest pain, shortness of breath, GI complaints/IBD,  urinary complaints or hx of proteinura/nephritis,  alopecia , fatigue, photosensitivity, headache, change in vision, face & jaw pain, ,psoriasis, numbness, anxiety, abdomen pain, nausea, vomiting. No hx of DVT, PE, miscarriages where applicable.    Patient with macular degeneration receives injections for this.   + Sulfa allergy.   REVIEW OF SYSTEMS:    Review of Systems   Constitutional:  Negative for fever.   Eyes:  Positive for redness.   Respiratory:  Negative for cough and shortness of breath.    Cardiovascular:   Negative for chest pain.   Gastrointestinal:  Negative for constipation and diarrhea.   Genitourinary:  Negative for dysuria.   Musculoskeletal:  Positive for back pain and joint pain.   Skin:  Negative for rash.   Neurological:  Negative for headaches.   Endo/Heme/Allergies:  Does not bruise/bleed easily.             Objective:            Past Medical History:   Diagnosis Date    Anxiety     Arthritis     Basal cell carcinoma of skin of other and unspecified parts of face 2012    Dx updated per  IMO Load    Essential hypertension 2012    History of melanoma     at age of 26    Hyperlipidemia 11/10/2018    Hypertension     Hypothyroidism due to acquired atrophy of thyroid 2012    Idiopathic scoliosis of thoracolumbar spine 2019    Insomnia 2014    Intermediate stage nonexudative age-related macular degeneration of both eyes 2018    Melanoma     age 26    Osteoarthritis 2012    Postmenopausal 2019    Stress incontinence 2018    Tortuous aorta 2015    Tremor      Family History   Problem Relation Age of Onset    Pancreatic cancer Mother     Leukemia Father     Lung cancer Brother     Liver cancer Sister      Social History     Tobacco Use    Smoking status: Former     Types: Cigarettes     Quit date: 1975     Years since quittin.9    Smokeless tobacco: Never   Substance Use Topics    Alcohol use: No    Drug use: No         Current Outpatient Medications on File Prior to Visit   Medication Sig Dispense Refill    b complex vitamins tablet Take 1 tablet by mouth once daily.      bisacodyL 5 mg Tab 1-3 tablet DAILY (route: oral)      cholecalciferol, vitamin D3, 100 mcg (4,000 unit) Cap capsule Take by mouth once daily.      estradioL (ESTRACE) 0.01 % (0.1 mg/gram) vaginal cream       hydroCHLOROthiazide (HYDRODIURIL) 12.5 MG Tab TAKE 1 TABLET BY MOUTH ONCE DAILY. 90 tablet 3    levothyroxine (SYNTHROID) 125 MCG tablet TAKE 1 TABLET EVERY DAY 90  tablet 1    magnesium oxide 500 mg Tab Take 1 tablet by mouth once daily.      meclizine (ANTIVERT) 12.5 mg tablet Take 1 tablet (12.5 mg total) by mouth 3 (three) times daily as needed for Nausea or Dizziness. 30 tablet 0    nabumetone (RELAFEN) 500 MG tablet Take 1 tablet (500 mg total) by mouth once daily. 90 tablet 3    propranoloL (INDERAL) 20 MG tablet TAKE 1 TABLET TWICE DAILY 180 tablet 1    temazepam (RESTORIL) 7.5 MG Cap Take 1 capsule (7.5 mg total) by mouth nightly as needed (Insomnia). 30 capsule 0    venlafaxine (EFFEXOR-XR) 150 MG Cp24 Take 1 capsule (150 mg total) by mouth once daily. 90 capsule 1    VIT C/E/ZN/COPPR/LUTEIN/ZEAXAN (PRESERVISION AREDS 2 ORAL) Take by mouth.      ketoconazole (NIZORAL) 2 % shampoo Apply topically twice a week. 120 mL 1    naproxen (NAPROSYN) 500 MG tablet        No current facility-administered medications on file prior to visit.       Vitals:    05/04/23 1338   BP: 136/64   Pulse: 74       Physical Exam:    Physical Exam  Constitutional:       Appearance: She is well-developed.   HENT:      Head: Normocephalic and atraumatic.   Eyes:      Pupils: Pupils are equal, round, and reactive to light.   Cardiovascular:      Rate and Rhythm: Normal rate and regular rhythm.      Heart sounds: Normal heart sounds.   Pulmonary:      Effort: Pulmonary effort is normal.      Breath sounds: Normal breath sounds.   Musculoskeletal:      Right shoulder: No swelling or tenderness. Normal range of motion.      Left shoulder: No swelling or tenderness. Normal range of motion.      Right elbow: No swelling. Normal range of motion. No tenderness.      Left elbow: No swelling. Normal range of motion. No tenderness.      Right wrist: No swelling or tenderness. Decreased range of motion.      Left wrist: No swelling or tenderness. Decreased range of motion.      Right hand: Deformity and tenderness present. No swelling. Decreased range of motion.      Left hand: Deformity and tenderness  present. No swelling. Decreased range of motion.      Cervical back: Normal range of motion.      Right knee: No swelling. Normal range of motion. No tenderness.      Left knee: No swelling. Normal range of motion. No tenderness.      Right foot: Normal range of motion. No swelling or tenderness.      Left foot: Normal range of motion. No swelling or tenderness.      Comments: Bony hypertrophy with some deformity of the PIP and DIP joints, no swelling or tenderness at the MCP joints.        Skin:     General: Skin is warm and dry.   Neurological:      Mental Status: She is alert and oriented to person, place, and time.   Psychiatric:         Behavior: Behavior normal.         Rapid3 Question Responses and Scores 12/30/2022   MDHAQ Score 1.4   Psychologic Score 3.3   Pain Score 4.5   When you awakened in the morning OVER THE LAST WEEK, did you feel stiff? Yes   If Yes, please indicate the number of hours until you are as limber as you will be for the day 2   Fatigue Score 3   Global Health Score 6   RAPID3 Score 5.06     Rapid3 Question Responses and Scores 5/1/2023   MDHAQ Score 0.8   Psychologic Score 2.2   Pain Score 4   When you awakened in the morning OVER THE LAST WEEK, did you feel stiff? No   If Yes, please indicate the number of hours until you are as limber as you will be for the day -   Fatigue Score 2   Global Health Score 5   RAPID3 Score 3.89           Assessment:       Encounter Diagnoses   Name Primary?    Primary osteoarthritis involving multiple joints Yes    Encounter for long-term (current) use of NSAIDs     Anemia due to other cause, not classified               Plan:        Primary osteoarthritis involving multiple joints    Encounter for long-term (current) use of NSAIDs    Anemia due to other cause, not classified  Comments:  very mild, stable. no changes.     Other orders  -     nabumetone (RELAFEN) 500 MG tablet; Take 1 tablet (500 mg total) by mouth once daily.  Dispense: 90 tablet; Refill:  3        Patient is an 87 y/o female with MCP, PIP and some DIP arthritis. She developed swelling of the MCP acutely and noted by hand surgeon to have significant MCP arthritis thusly referred to Rheumatology for eval of possible inflammatory arthritis.   No features of SLE, no Ps or IBD, will r/o RA     For now:  Labs and imaging  Decrease Relafen to 500mg ONCE daily   Use Tylenol 650mg- 1,000mg at night.     Discussed r/b/se for NSAID including renal, GI and other ASE      Follow up in about 6 months (around 11/4/2023).    30min consultation with greater than 50% spent in counseling, chart review and coordination of care. All questions answered.  Thank you for allowing me to participate in the care of this very pleasant patient.

## 2023-05-15 ENCOUNTER — TELEPHONE (OUTPATIENT)
Dept: DERMATOLOGY | Facility: CLINIC | Age: 88
End: 2023-05-15
Payer: MEDICARE

## 2023-07-14 ENCOUNTER — OFFICE VISIT (OUTPATIENT)
Dept: FAMILY MEDICINE | Facility: CLINIC | Age: 88
End: 2023-07-14
Payer: MEDICARE

## 2023-07-14 VITALS
DIASTOLIC BLOOD PRESSURE: 56 MMHG | RESPIRATION RATE: 16 BRPM | SYSTOLIC BLOOD PRESSURE: 102 MMHG | HEIGHT: 61 IN | HEART RATE: 64 BPM | WEIGHT: 139.13 LBS | BODY MASS INDEX: 26.27 KG/M2

## 2023-07-14 DIAGNOSIS — I10 ESSENTIAL HYPERTENSION: ICD-10-CM

## 2023-07-14 DIAGNOSIS — F41.9 ANXIETY: Primary | ICD-10-CM

## 2023-07-14 DIAGNOSIS — G47.01 INSOMNIA DUE TO MEDICAL CONDITION: ICD-10-CM

## 2023-07-14 DIAGNOSIS — E03.4 HYPOTHYROIDISM DUE TO ACQUIRED ATROPHY OF THYROID: ICD-10-CM

## 2023-07-14 PROCEDURE — 1101F PR PT FALLS ASSESS DOC 0-1 FALLS W/OUT INJ PAST YR: ICD-10-PCS | Mod: HCNC,CPTII,S$GLB, | Performed by: INTERNAL MEDICINE

## 2023-07-14 PROCEDURE — 99999 PR PBB SHADOW E&M-EST. PATIENT-LVL IV: CPT | Mod: PBBFAC,HCNC,, | Performed by: INTERNAL MEDICINE

## 2023-07-14 PROCEDURE — 1159F PR MEDICATION LIST DOCUMENTED IN MEDICAL RECORD: ICD-10-PCS | Mod: HCNC,CPTII,S$GLB, | Performed by: INTERNAL MEDICINE

## 2023-07-14 PROCEDURE — 99999 PR PBB SHADOW E&M-EST. PATIENT-LVL IV: ICD-10-PCS | Mod: PBBFAC,HCNC,, | Performed by: INTERNAL MEDICINE

## 2023-07-14 PROCEDURE — 99214 PR OFFICE/OUTPT VISIT, EST, LEVL IV, 30-39 MIN: ICD-10-PCS | Mod: HCNC,S$GLB,, | Performed by: INTERNAL MEDICINE

## 2023-07-14 PROCEDURE — 1159F MED LIST DOCD IN RCRD: CPT | Mod: HCNC,CPTII,S$GLB, | Performed by: INTERNAL MEDICINE

## 2023-07-14 PROCEDURE — 1160F RVW MEDS BY RX/DR IN RCRD: CPT | Mod: HCNC,CPTII,S$GLB, | Performed by: INTERNAL MEDICINE

## 2023-07-14 PROCEDURE — 99214 OFFICE O/P EST MOD 30 MIN: CPT | Mod: HCNC,S$GLB,, | Performed by: INTERNAL MEDICINE

## 2023-07-14 PROCEDURE — 3288F PR FALLS RISK ASSESSMENT DOCUMENTED: ICD-10-PCS | Mod: HCNC,CPTII,S$GLB, | Performed by: INTERNAL MEDICINE

## 2023-07-14 PROCEDURE — 3288F FALL RISK ASSESSMENT DOCD: CPT | Mod: HCNC,CPTII,S$GLB, | Performed by: INTERNAL MEDICINE

## 2023-07-14 PROCEDURE — 1160F PR REVIEW ALL MEDS BY PRESCRIBER/CLIN PHARMACIST DOCUMENTED: ICD-10-PCS | Mod: HCNC,CPTII,S$GLB, | Performed by: INTERNAL MEDICINE

## 2023-07-14 PROCEDURE — 1101F PT FALLS ASSESS-DOCD LE1/YR: CPT | Mod: HCNC,CPTII,S$GLB, | Performed by: INTERNAL MEDICINE

## 2023-07-14 NOTE — PROGRESS NOTES
Assessment and Plan:    1. Anxiety  Improved with higher dose of venlafaxine. Will continue.    2. Essential hypertension  Borderline low today, but historically gets too high when we hold the HCTZ. Otherwise has not been low. Encouraged home monitoring. Encouraged hydration.  - Comprehensive Metabolic Panel; Future  - Lipid Panel; Future    3. Insomnia due to medical condition  Can continue rare use of temazepam if needed.    4. Hypothyroidism due to acquired atrophy of thyroid  - TSH; Future      ______________________________________________________________________  Subjective:    Chief Complaint:  Follow up chronic medical conditions.    HPI:  Bhakti is a 89 y.o. year old female here to follow up chronic medical conditions.     HTN- Taking HCTZ 12.5 and propranolol 20 mg BID. BP has been controlled. Propranolol also for tremor.     Insomnia- Uses temazepam PRN for insomnia, she notes that she really hasn't been taking this as she didn't want to pay the copay. She uses melatonin PRN.      Anxiety- Taking venlafaxine 150 mg daily. Dose increased last visit. She notes that she doesn't notice a huge difference, but her daughter thinks that the higher dose is helping.      Hypothyroidism- Taking levothyroxine 125 mcg daily.     Sees Rheumatology for arthritis, no signs of inflammatory arthritis.     Medications:  Current Outpatient Medications on File Prior to Visit   Medication Sig Dispense Refill    b complex vitamins tablet Take 1 tablet by mouth once daily.      bisacodyL 5 mg Tab 1-3 tablet DAILY (route: oral)      cholecalciferol, vitamin D3, 100 mcg (4,000 unit) Cap capsule Take by mouth once daily.      estradioL (ESTRACE) 0.01 % (0.1 mg/gram) vaginal cream       hydroCHLOROthiazide (HYDRODIURIL) 12.5 MG Tab TAKE 1 TABLET BY MOUTH ONCE DAILY. 90 tablet 3    levothyroxine (SYNTHROID) 125 MCG tablet TAKE 1 TABLET EVERY DAY 90 tablet 1    magnesium oxide 500 mg Tab Take 1 tablet by mouth once daily.       nabumetone (RELAFEN) 500 MG tablet Take 1 tablet (500 mg total) by mouth once daily. 90 tablet 3    propranoloL (INDERAL) 20 MG tablet TAKE 1 TABLET TWICE DAILY 180 tablet 1    venlafaxine (EFFEXOR-XR) 150 MG Cp24 Take 1 capsule (150 mg total) by mouth once daily. 90 capsule 1    VIT C/E/ZN/COPPR/LUTEIN/ZEAXAN (PRESERVISION AREDS 2 ORAL) Take by mouth.      ketoconazole (NIZORAL) 2 % shampoo Apply topically twice a week. 120 mL 1    meclizine (ANTIVERT) 12.5 mg tablet Take 1 tablet (12.5 mg total) by mouth 3 (three) times daily as needed for Nausea or Dizziness. (Patient not taking: Reported on 7/14/2023) 30 tablet 0    temazepam (RESTORIL) 7.5 MG Cap Take 1 capsule (7.5 mg total) by mouth nightly as needed (Insomnia). (Patient not taking: Reported on 7/14/2023) 30 capsule 0     No current facility-administered medications on file prior to visit.       Review of Systems:  Review of Systems   Constitutional:  Negative for chills and fever.   Respiratory:  Negative for shortness of breath and wheezing.    Cardiovascular:  Negative for chest pain and leg swelling.   Gastrointestinal:  Negative for diarrhea, nausea and vomiting.   Neurological:  Negative for dizziness, syncope and light-headedness.     Past Medical History:  Past Medical History:   Diagnosis Date    Anxiety     Arthritis     Basal cell carcinoma of skin of other and unspecified parts of face 11/21/2012    Dx updated per 2019 IMO Load    Essential hypertension 05/21/2012    History of melanoma     at age of 26    Hyperlipidemia 11/10/2018    Hypertension     Hypothyroidism due to acquired atrophy of thyroid 05/21/2012    Idiopathic scoliosis of thoracolumbar spine 02/28/2019    Insomnia 02/25/2014    Intermediate stage nonexudative age-related macular degeneration of both eyes 04/12/2018    Melanoma     age 26    Osteoarthritis 05/21/2012    Postmenopausal 08/13/2019    Stress incontinence 04/12/2018    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    Tremor   "      Objective:    Vitals:  Vitals:    07/14/23 1114   BP: (!) 102/56   Pulse: 64   Resp: 16   Weight: 63.1 kg (139 lb 1.8 oz)   Height: 5' 1" (1.549 m)       Physical Exam  Vitals reviewed.   Constitutional:       General: She is not in acute distress.     Appearance: She is well-developed.   Eyes:      General:         Right eye: No discharge.         Left eye: No discharge.      Conjunctiva/sclera: Conjunctivae normal.   Cardiovascular:      Rate and Rhythm: Normal rate and regular rhythm.   Pulmonary:      Effort: Pulmonary effort is normal. No respiratory distress.   Skin:     General: Skin is warm and dry.   Neurological:      Mental Status: She is alert and oriented to person, place, and time.   Psychiatric:         Behavior: Behavior normal.         Thought Content: Thought content normal.         Judgment: Judgment normal.       Data:  Mild anemia last visit      Zoë Garcia MD  Internal Medicine    "

## 2023-07-19 ENCOUNTER — TELEPHONE (OUTPATIENT)
Dept: FAMILY MEDICINE | Facility: CLINIC | Age: 88
End: 2023-07-19
Payer: MEDICARE

## 2023-07-19 NOTE — TELEPHONE ENCOUNTER
Please call patient about labs. Has she been taking the levothyroxine correctly every day (first thing in the morning on an empty stomach, nothing else to eat or drink and no other pills for at least 30 minutes)? The lab shows that she needs a slightly higher dose, but if she has not been taking it correctly we would not need to change the dose.

## 2023-07-21 ENCOUNTER — PATIENT MESSAGE (OUTPATIENT)
Dept: FAMILY MEDICINE | Facility: CLINIC | Age: 88
End: 2023-07-21
Payer: MEDICARE

## 2023-07-31 RX ORDER — ESTRADIOL 0.1 MG/G
CREAM VAGINAL
Qty: 1 EACH | Refills: 1 | Status: SHIPPED | OUTPATIENT
Start: 2023-07-31 | End: 2023-12-11

## 2023-08-21 ENCOUNTER — TELEPHONE (OUTPATIENT)
Dept: DERMATOLOGY | Facility: CLINIC | Age: 88
End: 2023-08-21
Payer: MEDICARE

## 2023-08-21 NOTE — TELEPHONE ENCOUNTER
Spoke with patients POA, will be calling back throughout the day to reschedule derm appointment.  Verbalized understanding.

## 2023-08-25 ENCOUNTER — OFFICE VISIT (OUTPATIENT)
Dept: DERMATOLOGY | Facility: CLINIC | Age: 88
End: 2023-08-25
Payer: MEDICARE

## 2023-08-25 DIAGNOSIS — D18.01 CHERRY ANGIOMA: ICD-10-CM

## 2023-08-25 DIAGNOSIS — Z85.828 HISTORY OF NONMELANOMA SKIN CANCER: ICD-10-CM

## 2023-08-25 DIAGNOSIS — D22.9 BENIGN NEVUS: ICD-10-CM

## 2023-08-25 DIAGNOSIS — D48.5 NEOPLASM OF UNCERTAIN BEHAVIOR OF SKIN: ICD-10-CM

## 2023-08-25 DIAGNOSIS — L57.0 AK (ACTINIC KERATOSIS): ICD-10-CM

## 2023-08-25 DIAGNOSIS — Z85.820 HISTORY OF MALIGNANT MELANOMA: ICD-10-CM

## 2023-08-25 DIAGNOSIS — L81.4 LENTIGINES: Primary | ICD-10-CM

## 2023-08-25 DIAGNOSIS — L82.1 SK (SEBORRHEIC KERATOSIS): ICD-10-CM

## 2023-08-25 DIAGNOSIS — Z12.83 SCREENING FOR SKIN CANCER: ICD-10-CM

## 2023-08-25 PROCEDURE — 17003 DESTRUCT PREMALG LES 2-14: CPT | Mod: HCNC,S$GLB,, | Performed by: DERMATOLOGY

## 2023-08-25 PROCEDURE — 11102 TANGNTL BX SKIN SINGLE LES: CPT | Mod: HCNC,S$GLB,, | Performed by: DERMATOLOGY

## 2023-08-25 PROCEDURE — 99999 PR PBB SHADOW E&M-EST. PATIENT-LVL III: ICD-10-PCS | Mod: PBBFAC,HCNC,, | Performed by: DERMATOLOGY

## 2023-08-25 PROCEDURE — 99203 PR OFFICE/OUTPT VISIT, NEW, LEVL III, 30-44 MIN: ICD-10-PCS | Mod: 25,HCNC,S$GLB, | Performed by: DERMATOLOGY

## 2023-08-25 PROCEDURE — 99999 PR PBB SHADOW E&M-EST. PATIENT-LVL III: CPT | Mod: PBBFAC,HCNC,, | Performed by: DERMATOLOGY

## 2023-08-25 PROCEDURE — 88305 TISSUE EXAM BY PATHOLOGIST: CPT | Mod: 26,HCNC,, | Performed by: PATHOLOGY

## 2023-08-25 PROCEDURE — 17000 DESTRUCT PREMALG LESION: CPT | Mod: HCNC,XS,S$GLB, | Performed by: DERMATOLOGY

## 2023-08-25 PROCEDURE — 3288F FALL RISK ASSESSMENT DOCD: CPT | Mod: HCNC,CPTII,S$GLB, | Performed by: DERMATOLOGY

## 2023-08-25 PROCEDURE — 1159F PR MEDICATION LIST DOCUMENTED IN MEDICAL RECORD: ICD-10-PCS | Mod: HCNC,CPTII,S$GLB, | Performed by: DERMATOLOGY

## 2023-08-25 PROCEDURE — 1159F MED LIST DOCD IN RCRD: CPT | Mod: HCNC,CPTII,S$GLB, | Performed by: DERMATOLOGY

## 2023-08-25 PROCEDURE — 88305 TISSUE EXAM BY PATHOLOGIST: ICD-10-PCS | Mod: 26,HCNC,, | Performed by: PATHOLOGY

## 2023-08-25 PROCEDURE — 3288F PR FALLS RISK ASSESSMENT DOCUMENTED: ICD-10-PCS | Mod: HCNC,CPTII,S$GLB, | Performed by: DERMATOLOGY

## 2023-08-25 PROCEDURE — 1100F PTFALLS ASSESS-DOCD GE2>/YR: CPT | Mod: HCNC,CPTII,S$GLB, | Performed by: DERMATOLOGY

## 2023-08-25 PROCEDURE — 17000 PR DESTRUCTION(LASER SURGERY,CRYOSURGERY,CHEMOSURGERY),PREMALIGNANT LESIONS,FIRST LESION: ICD-10-PCS | Mod: HCNC,XS,S$GLB, | Performed by: DERMATOLOGY

## 2023-08-25 PROCEDURE — 11102 PR TANGENTIAL BIOPSY, SKIN, SINGLE LESION: ICD-10-PCS | Mod: HCNC,S$GLB,, | Performed by: DERMATOLOGY

## 2023-08-25 PROCEDURE — 88305 TISSUE EXAM BY PATHOLOGIST: CPT | Mod: HCNC,PO | Performed by: PATHOLOGY

## 2023-08-25 PROCEDURE — 17003 DESTRUCTION, PREMALIGNANT LESIONS; SECOND THROUGH 14 LESIONS: ICD-10-PCS | Mod: HCNC,S$GLB,, | Performed by: DERMATOLOGY

## 2023-08-25 PROCEDURE — 99203 OFFICE O/P NEW LOW 30 MIN: CPT | Mod: 25,HCNC,S$GLB, | Performed by: DERMATOLOGY

## 2023-08-25 PROCEDURE — 1100F PR PT FALLS ASSESS DOC 2+ FALLS/FALL W/INJURY/YR: ICD-10-PCS | Mod: HCNC,CPTII,S$GLB, | Performed by: DERMATOLOGY

## 2023-08-25 NOTE — PROGRESS NOTES
Subjective:      Patient ID:  Bhakti Phipps is a 89 y.o. female who presents for No chief complaint on file.    HPI    New patient.  Here today for skin exam.   Hx of MM at L neck s/p WLE, bilateral lymph node dissection ~60 years ago.   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       Review of Systems    Objective:   Physical Exam   Constitutional: She appears well-developed and well-nourished. She is cooperative.   HENT:   Head: Normocephalic and atraumatic.   Eyes: Lids are normal. Lids are normal.  Right conjunctiva is not injected. Left conjunctiva is not injected. No conjunctival no injection.   Pulmonary/Chest: Effort normal. No respiratory distress.   Musculoskeletal: Lymphadenopathy:      Cervical: No cervical adenopathy.      Upper Body:   No axillary adenopathy present.No supraclavicular adenopathy is present.     Lymphadenopathy: No supraclavicular adenopathy is present.     She has no cervical adenopathy.     She has no axillary adenopathy.   Neurological: She is alert and oriented to person, place, and time.   Psychiatric: She has a normal mood and affect. Her speech is normal and behavior is normal. Mood, memory, affect and thought content normal.   Skin:   Areas Examined (abnormalities noted in diagram):   Scalp / Hair Palpated and Inspected  Head / Face Inspection Performed  Neck Inspection Performed  Chest / Axilla Inspection Performed  Abdomen Inspection Performed  Back Inspection Performed  RUE Inspected  LUE Inspection Performed  Nails and Digits Inspection Performed                 Diagram Legend     Erythematous scaling macule/papule c/w actinic keratosis       Vascular papule c/w angioma      Pigmented verrucoid papule/plaque c/w seborrheic keratosis      Yellow umbilicated papule c/w sebaceous hyperplasia      Irregularly shaped tan macule c/w lentigo     1-2 mm smooth white papules consistent with Milia      Movable subcutaneous cyst with punctum c/w  epidermal inclusion cyst      Subcutaneous movable cyst c/w pilar cyst      Firm pink to brown papule c/w dermatofibroma      Pedunculated fleshy papule(s) c/w skin tag(s)      Evenly pigmented macule c/w junctional nevus     Mildly variegated pigmented, slightly irregular-bordered macule c/w mildly atypical nevus      Flesh colored to evenly pigmented papule c/w intradermal nevus       Pink pearly papule/plaque c/w basal cell carcinoma      Erythematous hyperkeratotic cursted plaque c/w SCC      Surgical scar with no sign of skin cancer recurrence      Open and closed comedones      Inflammatory papules and pustules      Verrucoid papule consistent consistent with wart     Erythematous eczematous patches and plaques     Dystrophic onycholytic nail with subungual debris c/w onychomycosis     Umbilicated papule    Erythematous-base heme-crusted tan verrucoid plaque consistent with inflamed seborrheic keratosis     Erythematous Silvery Scaling Plaque c/w Psoriasis     See annotation        Assessment / Plan:      Pathology Orders:       Normal Orders This Visit    Specimen to Pathology, Dermatology     Comments:    Number of Specimens:->1  ------------------------->-------------------------  Spec 1 Procedure:->Biopsy  Spec 1 Clinical Impression:->isk vs r/o tin  Spec 1 Source:->right cheek  Release to patient->Immediate    Questions:    Procedure Type: Dermatology and skin neoplasms    Number of Specimens: 1    ------------------------: -------------------------    Spec 1 Procedure: Biopsy    Spec 1 Clinical Impression: isk vs r/o tin    Spec 1 Source: right cheek    Release to patient: Immediate          Neoplasm of uncertain behavior of skin  -     Specimen to Pathology, Dermatology  - Discussed diagnosis with patient and explained uncertain nature of condition, including differential DDX.   - Discussed treatment options (biopsy, close monitoring) with patient, including the risks and benefits of each. Patient  opted to pursue biopsy.  - Shave Biopsy Procedure Note: Discussed procedure with patient/patient's guardian including risks and benefits as well as treatment alternatives. Risks of procedure include pain, bleeding, infection, post-inflammatory pigmentary alteration, scar, recurrence. Patient informed that the purpose of a biopsy is sampling of condition in question rather than removal in entirety; further treatment may be necessary. Verbal consent obtained. Area to be biopsied marked and cleansed with alcohol. Local anesthesia achieved by injecting approximately 1 cc of 1% lidocaine with epinephrine. One shave biopsy performed using a double edge razor blade; specimen submitted to pathology. Hemostasis achieved with aluminum chloride. Petroleum jelly and bandage applied to wound. Patient tolerated procedure well. After-visit wound care instructions reviewed and provided in writing.     AK (actinic keratosis)  - Discussed diagnosis, etiology, and precancerous nature of condition.   - Cryosurgery Procedure Note: Discussed procedure with patient/patient's guardian including risks and benefits as well as treatment alternatives. Risks of procedure include pain, itching, swelling, redness, blistering, crusting, wound formation, post-inflammatory pigmentary alteration, scar, recurrence. Verbal consent obtained. LN2 cryosurgery performed to 12 lesion(s). Patient tolerated procedure well. After-visit wound care instructions reviewed and provided in writing.      Lentigines  - Benign; reassured treatment not necessary.   - Recommended daily sun protection, including the use of OTC broad-spectrum sunscreen (SPF 30 or greater) and sun-protective clothing.       SK (seborrheic keratosis)  Cherry angioma  Benign nevus  - Benign; reassured treatment not necessary.      History of malignant melanoma  History of nonmelanoma skin cancer  Screening for skin cancer  - Upper body skin examination performed today.  - Findings listed  above.   - Sites of prior malignancy and regional LN examined - no concern for recurrence today.    - Recommended routine self examination of skin.    - Recommended daily sun protection, including the use of OTC broad-spectrum sunscreen (SPF 30 or greater) and sun-protective clothing.             Follow up in about 6 months (around 2/25/2024) for skin check, sooner pending pathology, sooner PRN.

## 2023-08-26 NOTE — PATIENT INSTRUCTIONS
Shave Biopsy Wound Care    Your doctor has performed a shave biopsy today.  A band aid and vaseline ointment has been placed over the site.  This should remain in place for NO LONGER THAN 48 hours.  It is fine to remove the bandaid after 24 hours, if the area is no longer bleeding. It is recommended that you keep the area dry (do not wet)) for the first 24 hours.  After 24 hours, wash the area with warm soap and water and apply Vaseline jelly.  Many patients prefer to use Neosporin or Bacitracin ointment.  This is acceptable; however, know that you can develop an allergy to this medication even if you have used it safely for years.  It is important to keep the area moist.  Letting it dry out and get air slows healing time, and will worsen the scar.        If you notice increasing redness, tenderness, pain, or yellow drainage at the biopsy site, please notify your doctor.  These are signs of an infection.    If your biopsy site is bleeding, apply firm pressure for 15 minutes straight.  Repeat for another 15 minutes, if it is still bleeding.   If the surgical site continues to bleed, then please contact your doctor.      For [facility] users:   You will receive your biopsy results in [facility] as soon as they are available. Please be assured that your physician/provider will review your results and will then determine what further treatment, evaluation, or planning is required. You should be contacted by your physician's/provider's office within 5 business days of receiving your results; If not, please reach out to directly. This is one more way [facility] is putting you first.     [facility] [place] [address]/ [phone] FAX/ [email or web address]     CRYOSURGERY      Your doctor has used a method called cryosurgery to treat your skin condition. Cryosurgery refers to the use of very cold substances to treat a variety of skin conditions such as warts, pre-skin cancers, molluscum contagiosum,  sun spots, and several benign growths. The substance we use in cryosurgery is liquid nitrogen and is so cold (-195 degrees Celsius) that is burns when administered.     Following treatment in the office, the skin may immediately burn and become red. You may find the area around the lesion is affected as well. It is sometimes necessary to treat not only the lesion, but a small area of the surrounding normal skin to achieve a good response.     A blister, and even a blood filled blister, may form after treatment.   This is a normal response. If the blister is painful, it is acceptable to sterilize a needle and with rubbing alcohol and gently pop the blister. It is important that you gently wash the area with soap and warm water as the blister fluid may contain wart virus if a wart was treated. Do no remove the roof of the blister.     The area treated can take anywhere from 1-3 weeks to heal. Healing time depends on the kind skin lesion treated, the location, and how aggressively the lesion was treated. It is recommended that the areas treated are covered with Vaseline or bacitracin ointment and a band-aid. If a band-aid is not practical, just ointment applied several times per day will do. Keeping these areas moist will speed the healing time.    Treatment with liquid nitrogen can leave a scar. In dark skin, it may be a light or dark scar, in light skin it may be a white or pink scar. These will generally fade with time, but may never go away completely.     If you have any concerns after your treatment, please feel free to call the office.       [facility] [place] [address]/ [phone] FAX/ [email or web address] What Are the Symptoms of Skin Cancer?  A change in your skin is the most common sign of skin cancer. This could be a new growth, a sore that doesnt heal, or a change in a mole. Not all skin cancers look the same.    For melanoma specifically, a simple way to remember the warning  signs is to remember the A-B-C-D-Es of melanoma--    A stands for asymmetrical. Does the mole or spot have an irregular shape with two parts that look very different?  B stands for border. Is the border irregular or jagged?  C is for color. Is the color uneven?  D is for diameter. Is the mole or spot larger than the size of a pea?  E is for evolving. Has the mole or spot changed during the past few weeks or months?    Talk to your doctor if you notice changes in your skin such as a new growth, a sore that doesnt heal, a change in an old growth, or any of the A-B-C-D-Es of melanoma    What Can I Do to Reduce My Risk of Skin Cancer?  Protection from ultraviolet (UV) radiation is important all year, not just during the summer or at the beach. UV rays from the sun can reach you on cloudy and hazy days, not just on bright and reynaldo days. UV rays also reflect off of surfaces like water, cement, sand, and snow. Indoor tanning (using a tanning bed, hollingsworth, or sunlamp to get tan) exposes users to UV radiation.    The hours between 10 a.m. and 4 p.m. Daylight Saving Time (9 a.m. to 3 p.m. standard time) are the most hazardous for UV exposure outdoors in the continental [place]. UV rays from sunlight are the greatest during the late spring and early summer in [place].    CDC recommends easy options for protection from UV radiation--    Stay in the shade or indoors, especially during midday hours.  Wear clothing that covers your arms and legs.  Wear a hat with a wide brim to shade your face, head, ears, and neck.  Wear sunglasses that wrap around and block both UVA and UVB rays.  Use sunscreen with a sun protection factor (SPF) of 30 or higher, and both UVA and UVB (broad spectrum) protection.  Avoid indoor tanning.    Adapted from https://www.cdc.gov/cancer/skin/basic_info/

## 2023-08-29 ENCOUNTER — PATIENT MESSAGE (OUTPATIENT)
Dept: DERMATOLOGY | Facility: CLINIC | Age: 88
End: 2023-08-29
Payer: MEDICARE

## 2023-08-29 LAB
FINAL PATHOLOGIC DIAGNOSIS: NORMAL
GROSS: NORMAL
Lab: NORMAL
MICROSCOPIC EXAM: NORMAL

## 2023-08-31 ENCOUNTER — OFFICE VISIT (OUTPATIENT)
Dept: FAMILY MEDICINE | Facility: CLINIC | Age: 88
End: 2023-08-31
Payer: MEDICARE

## 2023-08-31 VITALS
WEIGHT: 138.31 LBS | HEIGHT: 61 IN | OXYGEN SATURATION: 95 % | HEART RATE: 70 BPM | SYSTOLIC BLOOD PRESSURE: 130 MMHG | RESPIRATION RATE: 16 BRPM | BODY MASS INDEX: 26.11 KG/M2 | DIASTOLIC BLOOD PRESSURE: 72 MMHG

## 2023-08-31 DIAGNOSIS — D04.39 SQUAMOUS CELL CARCINOMA IN SITU (SCCIS) OF SKIN OF RIGHT CHEEK: Primary | ICD-10-CM

## 2023-08-31 DIAGNOSIS — S09.90XD TRAUMATIC INJURY OF HEAD, SUBSEQUENT ENCOUNTER: Primary | ICD-10-CM

## 2023-08-31 PROCEDURE — 1160F PR REVIEW ALL MEDS BY PRESCRIBER/CLIN PHARMACIST DOCUMENTED: ICD-10-PCS | Mod: HCNC,CPTII,S$GLB, | Performed by: INTERNAL MEDICINE

## 2023-08-31 PROCEDURE — 1100F PR PT FALLS ASSESS DOC 2+ FALLS/FALL W/INJURY/YR: ICD-10-PCS | Mod: HCNC,CPTII,S$GLB, | Performed by: INTERNAL MEDICINE

## 2023-08-31 PROCEDURE — 99999 PR PBB SHADOW E&M-EST. PATIENT-LVL IV: CPT | Mod: PBBFAC,HCNC,, | Performed by: INTERNAL MEDICINE

## 2023-08-31 PROCEDURE — 99214 OFFICE O/P EST MOD 30 MIN: CPT | Mod: HCNC,S$GLB,, | Performed by: INTERNAL MEDICINE

## 2023-08-31 PROCEDURE — 3288F PR FALLS RISK ASSESSMENT DOCUMENTED: ICD-10-PCS | Mod: HCNC,CPTII,S$GLB, | Performed by: INTERNAL MEDICINE

## 2023-08-31 PROCEDURE — 1100F PTFALLS ASSESS-DOCD GE2>/YR: CPT | Mod: HCNC,CPTII,S$GLB, | Performed by: INTERNAL MEDICINE

## 2023-08-31 PROCEDURE — 99214 PR OFFICE/OUTPT VISIT, EST, LEVL IV, 30-39 MIN: ICD-10-PCS | Mod: HCNC,S$GLB,, | Performed by: INTERNAL MEDICINE

## 2023-08-31 PROCEDURE — 99999 PR PBB SHADOW E&M-EST. PATIENT-LVL IV: ICD-10-PCS | Mod: PBBFAC,HCNC,, | Performed by: INTERNAL MEDICINE

## 2023-08-31 PROCEDURE — 1159F MED LIST DOCD IN RCRD: CPT | Mod: HCNC,CPTII,S$GLB, | Performed by: INTERNAL MEDICINE

## 2023-08-31 PROCEDURE — 1160F RVW MEDS BY RX/DR IN RCRD: CPT | Mod: HCNC,CPTII,S$GLB, | Performed by: INTERNAL MEDICINE

## 2023-08-31 PROCEDURE — 3288F FALL RISK ASSESSMENT DOCD: CPT | Mod: HCNC,CPTII,S$GLB, | Performed by: INTERNAL MEDICINE

## 2023-08-31 PROCEDURE — 1159F PR MEDICATION LIST DOCUMENTED IN MEDICAL RECORD: ICD-10-PCS | Mod: HCNC,CPTII,S$GLB, | Performed by: INTERNAL MEDICINE

## 2023-08-31 NOTE — PROGRESS NOTES
Assessment and Plan:    1. Traumatic injury of head, subsequent encounter  Patient is feeling well with no evidence of concussion at this time.  Reviewed imaging with her.  Blood pressure was elevated when she was in the hospital, but has since returned to normal.  No additional treatment needed at this time.    ______________________________________________________________________  Subjective:    Chief Complaint:  ER follow-up    HPI:  Bhakti is a 89 y.o. year old female here for ER follow-up    She was seen in [facility] ER on 08/08/2023 for a head contusion after she was knocked over by her dog.  She denied any loss of consciousness and she is not on any anticoagulation.  She was noted on exam to have a contusion to her posterior scalp with hematoma as well as mild oozing from an overlying abrasion without any substantial laceration.  She did not end up needing stitches.  Head CT was notable for a posterior parietal scalp hematoma but no intracranial abnormalities.  She was noted to have a blood pressure as high as 201/74 while in the ER.    She notes that when she got home, she had rechecked her blood pressure and it was normal.     She reports that she is not having any symptoms since getting out of the ER.  She has not had any headaches.  She is not had any nausea or vomiting.  No vision changes.  She is not had any bleeding from her scalp laceration.    Medications:  Current Outpatient Medications on File Prior to Visit   Medication Sig Dispense Refill    b complex vitamins tablet Take 1 tablet by mouth once daily.      bisacodyL 5 mg Tab 1-3 tablet DAILY (route: oral)      cholecalciferol, vitamin D3, 100 mcg (4,000 unit) Cap capsule Take by mouth once daily.      estradioL (ESTRACE) 0.01 % (0.1 mg/gram) vaginal cream PLACE 1 GRAM (1/4 APPLICATORFUL = 1GRAM) VAGINALLY TWICE A WEEK (DX. STRESS INCONTINENCE WITH ATROPHIC VAGINITIS) 1 each 1    hydroCHLOROthiazide (HYDRODIURIL) 12.5 MG Tab TAKE 1 TABLET BY  MOUTH ONCE DAILY. 90 tablet 3    levothyroxine (SYNTHROID) 125 MCG tablet TAKE 1 TABLET EVERY DAY 90 tablet 1    magnesium oxide 500 mg Tab Take 1 tablet by mouth once daily.      meclizine (ANTIVERT) 12.5 mg tablet Take 1 tablet (12.5 mg total) by mouth 3 (three) times daily as needed for Nausea or Dizziness. 30 tablet 0    nabumetone (RELAFEN) 500 MG tablet Take 1 tablet (500 mg total) by mouth once daily. 90 tablet 3    propranoloL (INDERAL) 20 MG tablet TAKE 1 TABLET TWICE DAILY 180 tablet 1    temazepam (RESTORIL) 7.5 MG Cap Take 1 capsule (7.5 mg total) by mouth nightly as needed (Insomnia). 30 capsule 0    venlafaxine (EFFEXOR-XR) 150 MG Cp24 Take 1 capsule (150 mg total) by mouth once daily. 90 capsule 1    VIT C/E/ZN/COPPR/LUTEIN/ZEAXAN (PRESERVISION AREDS 2 ORAL) Take by mouth.      ketoconazole (NIZORAL) 2 % shampoo Apply topically twice a week. 120 mL 1     No current facility-administered medications on file prior to visit.       Review of Systems:  Review of Systems   Gastrointestinal:  Negative for nausea and vomiting.   Neurological:  Negative for dizziness, syncope, speech difficulty, weakness, light-headedness and headaches.       Past Medical History:  Past Medical History:   Diagnosis Date    Anxiety     Arthritis     Basal cell carcinoma of skin of other and unspecified parts of face 11/21/2012    Dx updated per 2019 IMO Load    Essential hypertension 05/21/2012    History of melanoma     at age of 26    Hyperlipidemia 11/10/2018    Hypertension     Hypothyroidism due to acquired atrophy of thyroid 05/21/2012    Idiopathic scoliosis of thoracolumbar spine 02/28/2019    Insomnia 02/25/2014    Intermediate stage nonexudative age-related macular degeneration of both eyes 04/12/2018    Melanoma     age 26    Osteoarthritis 05/21/2012    Postmenopausal 08/13/2019    Stress incontinence 04/12/2018    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   Constitutional:       General: She is not in acute distress.     Appearance: She is well-developed.   Eyes:      General:         Right eye: No discharge.         Left eye: No discharge.      Conjunctiva/sclera: Conjunctivae normal.   Cardiovascular:      Rate and Rhythm: Normal rate and regular rhythm.   Pulmonary:      Effort: Pulmonary effort is normal. No respiratory distress.   Skin:     General: Skin is warm and dry.   Neurological:      General: No focal deficit present.      Mental Status: She is alert and oriented to person, place, and time.      Motor: No weakness.      Gait: Gait normal.   Psychiatric:         Behavior: Behavior normal.         Thought Content: Thought content normal.         Judgment: Judgment normal.         Data:  CT head from ER with no intracranial abnormality    Zoë Garcia MD  Internal Medicine    "

## 2023-08-31 NOTE — PROGRESS NOTES
1. 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   - THE TUMOR EXTENDS TO THE DEEP BIOPSY MARGIN.     Please call to discuss results / plan / schedule:  Biopsy confirmed lesion to be a squamous cell carcinoma, recommend Mohs surgery with Dr STRONG. Please contact patient re results then forward to Mohs team for scheduling. Thank  you.

## 2023-09-09 DIAGNOSIS — F41.9 ANXIETY: ICD-10-CM

## 2023-09-09 NOTE — TELEPHONE ENCOUNTER
No care due was identified.  [facility] Embedded Care Due Messages. Reference number: [id number].   9/09/2023 2:07:08 AM CDT

## 2023-09-10 RX ORDER — VENLAFAXINE HYDROCHLORIDE 150 MG/1
150 CAPSULE, EXTENDED RELEASE ORAL
Qty: 90 CAPSULE | Refills: 3 | Status: SHIPPED | OUTPATIENT
Start: 2023-09-10

## 2023-09-10 NOTE — TELEPHONE ENCOUNTER
Refill Routing Note   Medication(s) are not appropriate for processing by [facility] for the following reason(s):      Required labs abnormal    [facility] action(s):  Defer  Approve Care Due:  None identified            Appointments  past 12m or future 3m with PCP    Date Provider   Last Visit   8/31/2023 Zoë Garcia MD   Next Visit   Visit date not found Zoë Garcia MD   ED visits in past 90 days: 1        Note composed:2:19 AM 09/10/2023

## 2023-09-13 RX ORDER — LEVOTHYROXINE SODIUM 125 UG/1
TABLET ORAL
Qty: 90 TABLET | Refills: 1 | Status: SHIPPED | OUTPATIENT
Start: 2023-09-13

## 2023-09-25 ENCOUNTER — PROCEDURE VISIT (OUTPATIENT)
Dept: DERMATOLOGY | Facility: CLINIC | Age: 88
End: 2023-09-25
Payer: MEDICARE

## 2023-09-25 VITALS
SYSTOLIC BLOOD PRESSURE: 163 MMHG | BODY MASS INDEX: 25.68 KG/M2 | HEIGHT: 61 IN | WEIGHT: 136 LBS | DIASTOLIC BLOOD PRESSURE: 67 MMHG | HEART RATE: 67 BPM

## 2023-09-25 DIAGNOSIS — D04.39 SQUAMOUS CELL CARCINOMA IN SITU (SCCIS) OF SKIN OF RIGHT CHEEK: ICD-10-CM

## 2023-09-25 PROCEDURE — 13132 PR RECMPL WND HEAD,FAC,HAND 2.6-7.5 CM: ICD-10-PCS | Mod: HCNC,51,S$GLB, | Performed by: DERMATOLOGY

## 2023-09-25 PROCEDURE — 17311: ICD-10-PCS | Mod: HCNC,S$GLB,, | Performed by: DERMATOLOGY

## 2023-09-25 PROCEDURE — 17312: ICD-10-PCS | Mod: HCNC,S$GLB,, | Performed by: DERMATOLOGY

## 2023-09-25 PROCEDURE — 17311 MOHS 1 STAGE H/N/HF/G: CPT | Mod: HCNC,S$GLB,, | Performed by: DERMATOLOGY

## 2023-09-25 PROCEDURE — 13132 CMPLX RPR F/C/C/M/N/AX/G/H/F: CPT | Mod: HCNC,51,S$GLB, | Performed by: DERMATOLOGY

## 2023-09-25 PROCEDURE — 17312 MOHS ADDL STAGE: CPT | Mod: HCNC,S$GLB,, | Performed by: DERMATOLOGY

## 2023-09-25 NOTE — PROGRESS NOTES
MOHS MICROGRAPHIC SURGERY OPERATIVE NOTE  Name:  Bhakti Phipps  Date: 2023  Patient : 1934  Attending Surgeon: Marshall Salinas MD  Assistants: Peace Lewis - Surgical Technician, Brenda Bernard - Histology Technician, and Kayley Alfaro - Histology Technician  Anesthetic Agent: 1% lidocaine with 1:100,000 epinephrine  Clinical Diagnosis: squamous cell carcinoma in situ  Operation: Mohs Micrographic Surgery  Location: right cheek  Indications: Location in non-mask areas of face where maximum conservation of tumor-free tissue is needed.  Surgical Preparation: povidone-iodine     Description of Operation:  The nature and purpose of the procedure, associated risks and alternative treatments were explained to the patient in detail. All patient questions were answered completely. An informed operative consent and photography permit were obtained. The tumor location was then identified and marked with agreement by the patient of the correct location. The patient was positioned, prepped, and draped in the usual sterile manner. Local anesthesia was obtained with 1 cc(s) of 1% lidocaine with 1:100,000 epinephrine. The lesion pre-operatively measures 0.5 by 0.5 cm.     1ST STAGE:  A 2+ mm rim of normal appearing skin was marked circumferentially around the lesion after scraping with a curette to define the margin. The area thus outlined was excised at a 45 degree angle. Hemostasis was obtained with electrodesiccation. The specimen was oriented, mapped, and subdivided into at least two sections. Sections were then chromacoded and submitted for horizontal frozen sections. The patient tolerated the procedure well and there were no complications. Upon microscopic examination of the processed horizontal frozen sections of this stage:  Tumor was present at the margin of the specimen; these areas of residual tumor were marked on the reference map with red pencil, pinpointing the location in which further  tissue excision was necessary.  Tumor type noted on stage 1: Squamous cell carcinoma in situ: Epidermis with full-thickness atypia and variable epidermal maturation.     SUBSEQUENT STAGES:  The patient returned to the operating room for additional stages of tumor removal, and prepped in the manner described above. Surgery was directed to the areas having residual tumor, with thin layers of tissue being excised from these regions. A new reference map was prepared during the surgery to maintain precise orientation as described above. Hemostasis was achieved and the excised tissue was processed for microscopic analysis.  These sections were then examined by the Mohs surgeon, and areas of persistent tumor were indicated on the reference map. This process was repeated until the frozen horizontal sections of STAGE 3 revealed no further tumor cells and tumor eradication was considered to be  complete.  Tumor type noted on subsequent stages: Squamous cell carcinoma in situ: Epidermis with full-thickness atypia and variable epidermal maturation.     SUMMARY:  The tumor was extirpated in 3 Mohs Stages resulting in a final defect measuring 1.3 by 1.2 cm².   The final defect extended deep to subcutaneous fat  The patient tolerated the procedure well and no complications were noted.     PHOTOS:      Marshall Salinas MD    Complex Linear Closure Operative Note  Name: Bhakti Phipps  YOB: 1934  Date: 9/25/2023  Attending Surgeon: Marshall Salinas MD FAAD  Assistants:  Peace Lewis - Surgical Technician, Brenda Bernard - Histology Technician, and Kayley Alfaro - Histology Technician  Clinical Diagnosis: A 1.3 by 1.2 cm defect secondary to Mohs Micrographic Surgery  Location: right cheek  Operation: Complex linear closure  Surgical Preparation: broad scrub with povidone-iodine    Description of Operation:  The nature and purpose of the procedure, associated risks and alternative treatments were explained  to the patient in detail. All patient questions were answered completely. In order to minimize tension on the closure and avoid compromising the anatomic contour of the cosmetic region and maximize functional capacity, a complex linear closure was performed. An informed operative consent and photography permit were obtained. The patient was positioned, prepped, and draped in the usual sterile manner. Local anesthesia was obtained with 3 cc(s) of 1% lidocaine with 1:100,000 epinephrine.    The defect edges were debeveled with a #15 scalpel blade. The circular defect was widely undermined in the deep subcutaneous plane at least 100% of the defect diameter to allow for maximum tissue movement. Hemostasis was achieved with spot electrodessication. The defect was closed first utilizing multiple 4-0 Vicryl interrupted buried subcutaneous sutures. This resulted in excellent apposition of the dermis and epidermis, however two redundant areas of tissue were created on opposite sides of the defect. Utilizing a #15 scalpel blade, two Burows triangles were excised to ensure a flat scar. Hemostasis was obtained with spot electrodessication. The skin edges throughout further fastened superficially with 5-0 Nylon. The final length of the repair was 3.5 cm.  The patient tolerated the procedure well and there were no complications.  Polysporin, Telfa and a pressure dressing were applied to sutures after gentle cleansing with saline.    Patient instructed in and provided with instructions for post-op care, will RTC in 7 days for suture removal    Post op meds: None    Photos:        Marshall Salinas MD

## 2023-10-02 ENCOUNTER — CLINICAL SUPPORT (OUTPATIENT)
Dept: DERMATOLOGY | Facility: CLINIC | Age: 88
End: 2023-10-02
Payer: MEDICARE

## 2023-10-02 DIAGNOSIS — D04.39 SQUAMOUS CELL CARCINOMA IN SITU (SCCIS) OF SKIN OF RIGHT CHEEK: Primary | ICD-10-CM

## 2023-10-02 PROCEDURE — 99999 PR PBB SHADOW E&M-EST. PATIENT-LVL II: ICD-10-PCS | Mod: PBBFAC,HCNC,,

## 2023-10-02 PROCEDURE — 99999 PR PBB SHADOW E&M-EST. PATIENT-LVL II: CPT | Mod: PBBFAC,HCNC,,

## 2023-10-02 NOTE — PATIENT INSTRUCTIONS
Silicone Scar Treatment    Silicone gel sheeting is a simple and inexpensive treatment that has been clinically proven to aid in superficial wound healing following surgical procedures,         and can even improve the appearance of older scars.   The consistent use of silicone gel sheets helps to provide the best possible cosmetic outcome post-operatively.     How to use Silicone Gel Sheets:  Any brand is fine, generic drugstore brands should be equally effective. They are also available on [employer].com  Cut the Silicone gel sheet to a size slightly larger than the scar and apply overnight to the affected area.   The sheets can also be worn during the daytime, if you are amenable to doing so.  Remove sheet in the AM and rinse it off in the sink, some sheets can be re-used for up to 3 weeks.  Paper tape can be used to keep sheets in place while sleeping, if necessary  Sheeting should be used for at least 8 weeks for maximum scar improvement.  Over the counter brands include ScarAway, [facility] brand, [facility] brand, etc.    Silicone Gels:  Silicone gels provide the benefits of silicone without the need for a visible bandage during the day  They dry quickly (within 5-10 minutes) and provide an invisible barrier that can last all day  Over the counter brands include Biocorneum, ScarAway, etc.

## 2023-10-11 RX ORDER — PROPRANOLOL HYDROCHLORIDE 20 MG/1
TABLET ORAL
Qty: 180 TABLET | Refills: 3 | Status: SHIPPED | OUTPATIENT
Start: 2023-10-11

## 2023-10-11 NOTE — TELEPHONE ENCOUNTER
Refill Routing Note   Medication(s) are not appropriate for processing by [facility] for the following reason(s):      Required vitals abnormal    [facility] action(s):  Defer Care Due:  None identified            Appointments  past 12m or future 3m with PCP    Date Provider   Last Visit   8/31/2023 Zoë Garcia MD   Next Visit   1/24/2024 Zoë Garcia MD   ED visits in past 90 days: 1        Note composed:2:12 PM 10/11/2023

## 2023-10-11 NOTE — TELEPHONE ENCOUNTER
No care due was identified.  [facility] Embedded Care Due Messages. Reference number: [id number].   10/11/2023 1:33:02 PM CDT

## 2023-10-25 ENCOUNTER — PATIENT MESSAGE (OUTPATIENT)
Dept: RHEUMATOLOGY | Facility: CLINIC | Age: 88
End: 2023-10-25
Payer: MEDICARE

## 2023-10-25 DIAGNOSIS — D64.89 ANEMIA DUE TO OTHER CAUSE, NOT CLASSIFIED: Primary | ICD-10-CM

## 2023-10-25 DIAGNOSIS — Z51.81 ENCOUNTER FOR MONITORING CHRONIC NSAID THERAPY: ICD-10-CM

## 2023-10-25 DIAGNOSIS — Z79.1 ENCOUNTER FOR MONITORING CHRONIC NSAID THERAPY: ICD-10-CM

## 2023-11-03 ENCOUNTER — OFFICE VISIT (OUTPATIENT)
Dept: RHEUMATOLOGY | Facility: CLINIC | Age: 88
End: 2023-11-03
Payer: MEDICARE

## 2023-11-03 VITALS
SYSTOLIC BLOOD PRESSURE: 147 MMHG | HEIGHT: 61 IN | WEIGHT: 136.88 LBS | BODY MASS INDEX: 25.84 KG/M2 | DIASTOLIC BLOOD PRESSURE: 61 MMHG | HEART RATE: 80 BPM

## 2023-11-03 DIAGNOSIS — M15.9 PRIMARY OSTEOARTHRITIS INVOLVING MULTIPLE JOINTS: Primary | ICD-10-CM

## 2023-11-03 DIAGNOSIS — D64.89 ANEMIA DUE TO OTHER CAUSE, NOT CLASSIFIED: ICD-10-CM

## 2023-11-03 PROCEDURE — 99999 PR PBB SHADOW E&M-EST. PATIENT-LVL IV: ICD-10-PCS | Mod: PBBFAC,HCNC,, | Performed by: INTERNAL MEDICINE

## 2023-11-03 PROCEDURE — 99214 PR OFFICE/OUTPT VISIT, EST, LEVL IV, 30-39 MIN: ICD-10-PCS | Mod: HCNC,S$GLB,, | Performed by: INTERNAL MEDICINE

## 2023-11-03 PROCEDURE — 3288F PR FALLS RISK ASSESSMENT DOCUMENTED: ICD-10-PCS | Mod: HCNC,CPTII,S$GLB, | Performed by: INTERNAL MEDICINE

## 2023-11-03 PROCEDURE — 3288F FALL RISK ASSESSMENT DOCD: CPT | Mod: HCNC,CPTII,S$GLB, | Performed by: INTERNAL MEDICINE

## 2023-11-03 PROCEDURE — 1160F PR REVIEW ALL MEDS BY PRESCRIBER/CLIN PHARMACIST DOCUMENTED: ICD-10-PCS | Mod: HCNC,CPTII,S$GLB, | Performed by: INTERNAL MEDICINE

## 2023-11-03 PROCEDURE — 1160F RVW MEDS BY RX/DR IN RCRD: CPT | Mod: HCNC,CPTII,S$GLB, | Performed by: INTERNAL MEDICINE

## 2023-11-03 PROCEDURE — 1159F PR MEDICATION LIST DOCUMENTED IN MEDICAL RECORD: ICD-10-PCS | Mod: HCNC,CPTII,S$GLB, | Performed by: INTERNAL MEDICINE

## 2023-11-03 PROCEDURE — 1126F PR PAIN SEVERITY QUANTIFIED, NO PAIN PRESENT: ICD-10-PCS | Mod: HCNC,CPTII,S$GLB, | Performed by: INTERNAL MEDICINE

## 2023-11-03 PROCEDURE — 1159F MED LIST DOCD IN RCRD: CPT | Mod: HCNC,CPTII,S$GLB, | Performed by: INTERNAL MEDICINE

## 2023-11-03 PROCEDURE — 99999 PR PBB SHADOW E&M-EST. PATIENT-LVL IV: CPT | Mod: PBBFAC,HCNC,, | Performed by: INTERNAL MEDICINE

## 2023-11-03 PROCEDURE — 1100F PTFALLS ASSESS-DOCD GE2>/YR: CPT | Mod: HCNC,CPTII,S$GLB, | Performed by: INTERNAL MEDICINE

## 2023-11-03 PROCEDURE — 1126F AMNT PAIN NOTED NONE PRSNT: CPT | Mod: HCNC,CPTII,S$GLB, | Performed by: INTERNAL MEDICINE

## 2023-11-03 PROCEDURE — 99214 OFFICE O/P EST MOD 30 MIN: CPT | Mod: HCNC,S$GLB,, | Performed by: INTERNAL MEDICINE

## 2023-11-03 PROCEDURE — 1100F PR PT FALLS ASSESS DOC 2+ FALLS/FALL W/INJURY/YR: ICD-10-PCS | Mod: HCNC,CPTII,S$GLB, | Performed by: INTERNAL MEDICINE

## 2023-11-03 NOTE — PROGRESS NOTES
Subjective:          Chief Complaint: Bhakti Phipps is a 89 y.o. female who had concerns including Disease Management.    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     Patient is an 88-year-old female she was referred by Dr. Li to me first seen 8/20222 due to arthritis and concern for possible inflammatory arthritis. She also received injections within the 3rd MCP with Celestone own and bupivacaine 11/23/2021. Patient states the right 3rd MCP is first joint with severe swelling. She notes pain in various joints MCP and PIP mostly w/ left 1st IP deformed from injury with dog bite.   Serologies negative.   Inflammatory markers normal    She denies morning stiffness, notes worse with use. No pain with rest and much better with Relafen 500mg BID. No Gi upset. Patient's last renal function shows a creatinine that is within normal limits, but a slight decline in her renal fucntion.   She take ones tylenol PM at night.   Patient notes left sided LBP, s/p left TKA (2006) this has done very well. She denies PUD, GI bleed or renal disease.     Imaging studies which I cannot review but were included in Dr. Mitchell's notes show AP lateral oblique images of the right hand with significant arthritic changes particularly noticed in the right MCP joints.    Denies  symptoms of Raynaud's, fever, chills , dry mouth, dry eyes, dysphagia, tight  skin, signs of pleurisy , pericarditis, rashes/photosensitivity, oral ulcers,  chest pain, shortness of breath, GI complaints/IBD,  urinary complaints or hx of proteinura/nephritis,  alopecia , fatigue, photosensitivity, headache, change in vision, face & jaw pain, ,psoriasis, numbness, anxiety, abdomen pain, nausea, vomiting. No hx of DVT, PE, miscarriages where applicable.    Patient with macular degeneration receives injections for this.   + Sulfa allergy.   REVIEW OF SYSTEMS:    Review of Systems   Constitutional:  Negative for fever.   Eyes:  Positive for redness.    Respiratory:  Negative for cough and shortness of breath.    Cardiovascular:  Negative for chest pain.   Gastrointestinal:  Negative for constipation and diarrhea.   Genitourinary:  Negative for dysuria.   Musculoskeletal:  Positive for back pain and joint pain.   Skin:  Negative for rash.   Neurological:  Negative for headaches.   Endo/Heme/Allergies:  Does not bruise/bleed easily.               Objective:            Past Medical History:   Diagnosis Date    Anxiety     Arthritis     Basal cell carcinoma of skin of other and unspecified parts of face 2012    Dx updated per  IMO Load    Essential hypertension 2012    History of melanoma     at age of 26    Hyperlipidemia 11/10/2018    Hypertension     Hypothyroidism due to acquired atrophy of thyroid 2012    Idiopathic scoliosis of thoracolumbar spine 2019    Insomnia 2014    Intermediate stage nonexudative age-related macular degeneration of both eyes 2018    Melanoma     age 26    Osteoarthritis 2012    Postmenopausal 2019    Stress incontinence 2018    Tortuous aorta 2015    Tremor      Family History   Problem Relation Age of Onset    Pancreatic cancer Mother     Leukemia Father     Lung cancer Brother     Liver cancer Sister      Social History     Tobacco Use    Smoking status: Former     Current packs/day: 0.00     Types: Cigarettes     Quit date: 1975     Years since quittin.4    Smokeless tobacco: Never   Substance Use Topics    Alcohol use: No    Drug use: No         Current Outpatient Medications on File Prior to Visit   Medication Sig Dispense Refill    b complex vitamins tablet Take 1 tablet by mouth once daily.      cholecalciferol, vitamin D3, 100 mcg (4,000 unit) Cap capsule Take by mouth once daily.      hydroCHLOROthiazide (HYDRODIURIL) 12.5 MG Tab TAKE 1 TABLET BY MOUTH ONCE DAILY. 90 tablet 3    levothyroxine (SYNTHROID) 125 MCG tablet TAKE 1 TABLET EVERY DAY 90 tablet 1     magnesium oxide 500 mg Tab Take 1 tablet by mouth once daily.      nabumetone (RELAFEN) 500 MG tablet Take 1 tablet (500 mg total) by mouth once daily. 90 tablet 3    propranoloL (INDERAL) 20 MG tablet TAKE 1 TABLET TWICE DAILY 180 tablet 3    venlafaxine (EFFEXOR-XR) 150 MG Cp24 TAKE 1 CAPSULE ONE TIME DAILY 90 capsule 3    VIT C/E/ZN/COPPR/LUTEIN/ZEAXAN (PRESERVISION AREDS 2 ORAL) Take by mouth.      bisacodyL 5 mg Tab 1-3 tablet DAILY (route: oral)      estradioL (ESTRACE) 0.01 % (0.1 mg/gram) vaginal cream PLACE 1 GRAM (1/4 APPLICATORFUL = 1GRAM) VAGINALLY TWICE A WEEK (DX. STRESS INCONTINENCE WITH ATROPHIC VAGINITIS) 1 each 1    ketoconazole (NIZORAL) 2 % shampoo Apply topically twice a week. 120 mL 1    meclizine (ANTIVERT) 12.5 mg tablet Take 1 tablet (12.5 mg total) by mouth 3 (three) times daily as needed for Nausea or Dizziness. 30 tablet 0    temazepam (RESTORIL) 7.5 MG Cap Take 1 capsule (7.5 mg total) by mouth nightly as needed (Insomnia). 30 capsule 0     No current facility-administered medications on file prior to visit.       Vitals:    11/03/23 0934   BP: (!) 147/61   Pulse: 80       Physical Exam:    Physical Exam  Constitutional:       Appearance: She is well-developed.   HENT:      Head: Normocephalic and atraumatic.   Eyes:      Pupils: Pupils are equal, round, and reactive to light.   Cardiovascular:      Rate and Rhythm: Normal rate and regular rhythm.      Heart sounds: Normal heart sounds.   Pulmonary:      Effort: Pulmonary effort is normal.      Breath sounds: Normal breath sounds.   Musculoskeletal:      Right shoulder: No swelling or tenderness. Normal range of motion.      Left shoulder: No swelling or tenderness. Normal range of motion.      Right elbow: No swelling. Normal range of motion. No tenderness.      Left elbow: No swelling. Normal range of motion. No tenderness.      Right wrist: No swelling or tenderness. Decreased range of motion.      Left wrist: No swelling or  tenderness. Decreased range of motion.      Right hand: Deformity and tenderness present. No swelling. Decreased range of motion.      Left hand: Deformity and tenderness present. No swelling. Decreased range of motion.      Cervical back: Normal range of motion.      Right knee: No swelling. Normal range of motion. No tenderness.      Left knee: No swelling. Normal range of motion. No tenderness.      Right foot: Normal range of motion. No swelling or tenderness.      Left foot: Normal range of motion. No swelling or tenderness.      Comments: Bony hypertrophy with some deformity of the PIP and DIP joints, no swelling or tenderness at the MCP joints.        Skin:     General: Skin is warm and dry.   Neurological:      Mental Status: She is alert and oriented to person, place, and time.   Psychiatric:         Behavior: Behavior normal.           Rapid3 Question Responses and Scores 12/30/2022   MDHAQ Score 1.4   Psychologic Score 3.3   Pain Score 4.5   When you awakened in the morning OVER THE LAST WEEK, did you feel stiff? Yes   If Yes, please indicate the number of hours until you are as limber as you will be for the day 2   Fatigue Score 3   Global Health Score 6   RAPID3 Score 5.06     Rapid3 Question Responses and Scores 5/1/2023   MDHAQ Score 0.8   Psychologic Score 2.2   Pain Score 4   When you awakened in the morning OVER THE LAST WEEK, did you feel stiff? No   If Yes, please indicate the number of hours until you are as limber as you will be for the day -   Fatigue Score 2   Global Health Score 5   RAPID3 Score 3.89           Assessment:       Encounter Diagnoses   Name Primary?    Primary osteoarthritis involving multiple joints Yes    Anemia due to other cause, not classified                 Plan:        Primary osteoarthritis involving multiple joints  -     CBC Auto Differential; Future; Expected date: 11/03/2023  -     Renal Function Panel; Future; Expected date: 11/03/2023    Anemia due to other  cause, not classified  -     CBC Auto Differential; Future; Expected date: 11/03/2023  -     Renal Function Panel; Future; Expected date: 11/03/2023          Patient is an 87 y/o female with MCP, PIP and some DIP arthritis. She developed swelling of the MCP acutely and noted by hand surgeon to have significant MCP arthritis thusly referred to Rheumatology for eval of possible inflammatory arthritis.   No features of SLE, no Ps or IBD, will r/o RA     For now:  Labs and imaging  Decrease Relafen to 500mg ONCE daily   Use Tylenol 650mg- 1,000mg at night.     Discussed r/b/se for NSAID including renal, GI and other ASE  Try Tylenol arthritis:     Start with one 650mg tablet in the morning and one 650mg tablet at lunch or with dinner.     If this is not working try    Take Tylenol 1300mg (2 tablets) in the morning and 650mg (one tablet) at lunch or dinner.       Call if this is not working.     Follow up in about 6 months (around 5/3/2024).    30min consultation with greater than 50% spent in counseling, chart review and coordination of care. All questions answered.  Thank you for allowing me to participate in the care of this very pleasant patient.

## 2023-11-03 NOTE — PATIENT INSTRUCTIONS
Try Tylenol arthritis:     Start with one 650mg tablet in the morning and one 650mg tablet at lunch or with dinner.     If this is not working try    Take Tylenol 1300mg (2 tablets) in the morning and 650mg (one tablet) at lunch or dinner.       Call if this is not working.

## 2023-11-21 ENCOUNTER — TELEPHONE (OUTPATIENT)
Dept: DERMATOLOGY | Facility: CLINIC | Age: 88
End: 2023-11-21
Payer: MEDICARE

## 2023-11-21 ENCOUNTER — PATIENT MESSAGE (OUTPATIENT)
Dept: DERMATOLOGY | Facility: CLINIC | Age: 88
End: 2023-11-21
Payer: MEDICARE

## 2023-11-21 NOTE — TELEPHONE ENCOUNTER
----- Message from Dominik Vitale MA sent at 11/21/2023  1:20 PM CST -----  Contact: Kellie busch    ----- Message -----  From: Dunia Sánchez, Patient Care Assistant  Sent: 11/21/2023   9:36 AM CST  To: Antony [facility]ONT[facility] Staff    Kellie is calling to speak w/ a nurse [phone]  thanks

## 2023-11-23 ENCOUNTER — PATIENT MESSAGE (OUTPATIENT)
Dept: DERMATOLOGY | Facility: CLINIC | Age: 88
End: 2023-11-23
Payer: MEDICARE

## 2023-11-27 ENCOUNTER — OFFICE VISIT (OUTPATIENT)
Dept: URGENT CARE | Facility: CLINIC | Age: 88
End: 2023-11-27
Payer: MEDICARE

## 2023-11-27 ENCOUNTER — OFFICE VISIT (OUTPATIENT)
Dept: DERMATOLOGY | Facility: CLINIC | Age: 88
End: 2023-11-27
Payer: MEDICARE

## 2023-11-27 VITALS
WEIGHT: 136 LBS | HEART RATE: 80 BPM | SYSTOLIC BLOOD PRESSURE: 159 MMHG | DIASTOLIC BLOOD PRESSURE: 72 MMHG | BODY MASS INDEX: 25.68 KG/M2 | HEIGHT: 61 IN | RESPIRATION RATE: 16 BRPM | OXYGEN SATURATION: 98 % | TEMPERATURE: 97 F

## 2023-11-27 DIAGNOSIS — T81.41XA INFECTION INVOLVING SUTURE WITH ABSCESS: Primary | ICD-10-CM

## 2023-11-27 DIAGNOSIS — R30.0 DYSURIA: Primary | ICD-10-CM

## 2023-11-27 LAB
BILIRUB UR QL STRIP: NEGATIVE
GLUCOSE UR QL STRIP: NEGATIVE
KETONES UR QL STRIP: NEGATIVE
LEUKOCYTE ESTERASE UR QL STRIP: NEGATIVE
PH, POC UA: 5.5 (ref 5–8)
POC BLOOD, URINE: POSITIVE
POC NITRATES, URINE: POSITIVE
PROT UR QL STRIP: NEGATIVE
SP GR UR STRIP: 1.02 (ref 1–1.03)
URINE-COLOR: ABNORMAL
UROBILINOGEN UR STRIP-ACNC: NORMAL (ref 0.1–1.1)

## 2023-11-27 PROCEDURE — 87070 CULTURE OTHR SPECIMN AEROBIC: CPT | Mod: HCNC | Performed by: DERMATOLOGY

## 2023-11-27 PROCEDURE — 99213 OFFICE O/P EST LOW 20 MIN: CPT | Mod: HCNC,S$GLB,, | Performed by: DERMATOLOGY

## 2023-11-27 PROCEDURE — 87186 SC STD MICRODIL/AGAR DIL: CPT | Mod: 59,HCNC | Performed by: DERMATOLOGY

## 2023-11-27 PROCEDURE — 87186 SC STD MICRODIL/AGAR DIL: CPT | Mod: HCNC | Performed by: PHYSICIAN ASSISTANT

## 2023-11-27 PROCEDURE — 87077 CULTURE AEROBIC IDENTIFY: CPT | Mod: HCNC | Performed by: PHYSICIAN ASSISTANT

## 2023-11-27 PROCEDURE — 81003 POCT URINALYSIS, DIPSTICK, AUTOMATED, W/O SCOPE: ICD-10-PCS | Mod: QW,S$GLB,, | Performed by: PHYSICIAN ASSISTANT

## 2023-11-27 PROCEDURE — 87088 URINE BACTERIA CULTURE: CPT | Mod: HCNC | Performed by: PHYSICIAN ASSISTANT

## 2023-11-27 PROCEDURE — 99213 PR OFFICE/OUTPT VISIT, EST, LEVL III, 20-29 MIN: ICD-10-PCS | Mod: HCNC,S$GLB,, | Performed by: DERMATOLOGY

## 2023-11-27 PROCEDURE — 81003 URINALYSIS AUTO W/O SCOPE: CPT | Mod: QW,S$GLB,, | Performed by: PHYSICIAN ASSISTANT

## 2023-11-27 PROCEDURE — 87077 CULTURE AEROBIC IDENTIFY: CPT | Mod: 59,HCNC | Performed by: DERMATOLOGY

## 2023-11-27 PROCEDURE — 99213 OFFICE O/P EST LOW 20 MIN: CPT | Mod: S$GLB,,, | Performed by: PHYSICIAN ASSISTANT

## 2023-11-27 PROCEDURE — 99213 PR OFFICE/OUTPT VISIT, EST, LEVL III, 20-29 MIN: ICD-10-PCS | Mod: S$GLB,,, | Performed by: PHYSICIAN ASSISTANT

## 2023-11-27 PROCEDURE — 87086 URINE CULTURE/COLONY COUNT: CPT | Mod: HCNC | Performed by: PHYSICIAN ASSISTANT

## 2023-11-27 RX ORDER — CEPHALEXIN 500 MG/1
500 CAPSULE ORAL EVERY 6 HOURS
Qty: 28 CAPSULE | Refills: 0 | Status: SHIPPED | OUTPATIENT
Start: 2023-11-27 | End: 2023-12-04

## 2023-11-27 RX ORDER — MUPIROCIN 20 MG/G
OINTMENT TOPICAL 3 TIMES DAILY
Qty: 22 G | Refills: 3 | Status: SHIPPED | OUTPATIENT
Start: 2023-11-27

## 2023-11-27 NOTE — PROGRESS NOTES
"Subjective:      Patient ID: Bhakti Phipps is a 89 y.o. female.    Vitals:  height is 5' 0.98" (1.549 m) and weight is 61.7 kg (136 lb). Her oral temperature is 97.1 °F (36.2 °C). Her blood pressure is 159/72 (abnormal) and her pulse is 80. Her respiration is 16 and oxygen saturation is 98%.     Chief Complaint: Dysuria    Dysuria   This is a new problem. The current episode started in the past 7 days. The problem occurs every urination. The problem has been gradually worsening. The quality of the pain is described as burning. The pain is at a severity of 9/10. The pain is severe. There has been no fever. She is Not sexually active. There is No history of pyelonephritis. Associated symptoms include frequency and urgency. Pertinent negatives include no behavior changes, chills, discharge, flank pain, hematuria, hesitancy, nausea, possible pregnancy, sweats, vomiting, weight loss, bubble bath use, constipation, rash or withholding. She has tried nothing for the symptoms.     Constitution: Negative for chills, sweating, fatigue and fever.   HENT:  Negative for ear pain, drooling, congestion, sore throat, trouble swallowing and voice change.    Neck: Negative for neck pain, neck stiffness, painful lymph nodes and neck swelling.   Cardiovascular:  Negative for chest pain, leg swelling, palpitations, sob on exertion and passing out.   Eyes:  Negative for eye pain, eye redness, photophobia, double vision, blurred vision and eyelid swelling.   Respiratory:  Negative for chest tightness, cough, sputum production, bloody sputum, shortness of breath, stridor and wheezing.    Gastrointestinal:  Negative for abdominal pain, abdominal bloating, nausea, vomiting, constipation, diarrhea and heartburn.   Genitourinary:  Positive for dysuria, frequency and urgency. Negative for flank pain, hematuria, vaginal pain, vaginal discharge, vaginal bleeding, vaginal odor, genital sore and pelvic pain.   Musculoskeletal:  Negative for " joint pain, joint swelling, abnormal ROM of joint, back pain, muscle cramps and muscle ache.   Skin:  Negative for rash and hives.   Allergic/Immunologic: Negative for seasonal allergies, food allergies, hives, itching and sneezing.   Neurological:  Negative for dizziness, light-headedness, passing out, facial drooping, speech difficulty, loss of balance, headaches, altered mental status, loss of consciousness and seizures.   Hematologic/Lymphatic: Negative for swollen lymph nodes.   Psychiatric/Behavioral:  Negative for altered mental status and nervous/anxious. The patient is not nervous/anxious.       Objective:     Physical Exam   Constitutional: She is oriented to person, place, and time. She appears well-developed. She is cooperative.  Non-toxic appearance. She does not appear ill. No distress.   HENT:   Head: Normocephalic and atraumatic.   Ears:   Right Ear: External ear normal.   Left Ear: External ear normal.   Nose: Nose normal. No nasal deformity. No epistaxis.   Mouth/Throat: Uvula is midline, oropharynx is clear and moist and mucous membranes are normal.   Eyes: Conjunctivae and lids are normal. No scleral icterus.   Neck: Trachea normal and phonation normal. Neck supple. No edema present. No erythema present. No neck rigidity present.   Cardiovascular: Normal rate, regular rhythm, normal heart sounds and normal pulses.   Pulmonary/Chest: Effort normal and breath sounds normal. No accessory muscle usage or stridor. No respiratory distress. She has no decreased breath sounds. She has no wheezes. She has no rhonchi. She has no rales.   Abdominal: Normal appearance and bowel sounds are normal. Soft. There is no abdominal tenderness. There is no rebound, no guarding, no left CVA tenderness and no right CVA tenderness.   Musculoskeletal: Normal range of motion.         General: No deformity. Normal range of motion.   Neurological: She is alert and oriented to person, place, and time. She exhibits normal  muscle tone. Coordination normal.   Skin: Skin is warm, dry, intact, not diaphoretic, not pale and no rash. Capillary refill takes less than 2 seconds.   Psychiatric: Her speech is normal and behavior is normal. Judgment and thought content normal.   Nursing note and vitals reviewed.      Results for orders placed or performed in visit on 11/27/23   POCT Urinalysis, Dipstick, Automated, W/O Scope   Result Value Ref Range    POC Blood, Urine Positive (A) Negative    POC Bilirubin, Urine Negative Negative    POC Urobilinogen, Urine normal 0.1 - 1.1    POC Ketones, Urine Negative Negative    POC Protein, Urine Negative Negative    POC Nitrates, Urine Positive (A) Negative    POC Glucose, Urine Negative Negative    pH, UA 5.5 5 - 8    POC Specific Gravity, Urine 1.020 1.003 - 1.029    POC Leukocytes, Urine Negative Negative    Urine-Color LT. Yellow      Assessment:     1. Dysuria        Plan:   Discussed UA results with patient. Will call with Urine Culture results - niece Kellie is with her on exam and patient/niece would prefer niece to be called for results. Discussed DDX and treatment options with patient - will go ahead and treat empirically with antibiotics due to patient's symptoms at this time. Advised close follow-up with PCP and/or OBGYN and/or Urology for further evaluation as needed. ER precautions given to patient as well. Patient aware, verbalized understanding and agreed with plan of care.    Dysuria  -     POCT Urinalysis, Dipstick, Automated, W/O Scope  -     Urine culture    Other orders  -     cephALEXin (KEFLEX) 500 MG capsule; Take 1 capsule (500 mg total) by mouth every 6 (six) hours. for 7 days  Dispense: 28 capsule; Refill: 0        There are no Patient Instructions on file for this visit.

## 2023-11-28 NOTE — PROGRESS NOTES
Dermatology Outpatient Clinic Progress Note    Patient Name: Bhakti Phipps  Patient : 1934  Date of Service: 2023    CC/HPI: Bhakti Phipps is a 89 y.o. year old female with history of  MMS of the right cheek  who presents today for delayed suture reaction.  Patient reports swelling and bump.    ROS:   Dermatological ROS: positive for skin lesion changes    Physical Exam   Head           Diagram Legend     Erythematous scaling macule/papule c/w actinic keratosis       Vascular papule c/w angioma      Pigmented verrucoid papule/plaque c/w seborrheic keratosis      Yellow umbilicated papule c/w sebaceous hyperplasia      Irregularly shaped tan macule c/w lentigo     1-2 mm smooth white papules consistent with Milia      Movable subcutaneous cyst with punctum c/w epidermal inclusion cyst      Subcutaneous movable cyst c/w pilar cyst      Firm pink to brown papule c/w dermatofibroma      Pedunculated fleshy papule(s) c/w skin tag(s)      Evenly pigmented macule c/w junctional nevus     Mildly variegated pigmented, slightly irregular-bordered macule c/w mildly atypical nevus      Flesh colored to evenly pigmented papule c/w intradermal nevus       Pink pearly papule/plaque c/w basal cell carcinoma      Erythematous hyperkeratotic cursted plaque c/w SCC      Surgical scar with no sign of skin cancer recurrence      Open and closed comedones      Inflammatory papules and pustules      Verrucoid papule consistent consistent with wart     Erythematous eczematous patches and plaques     Dystrophic onycholytic nail with subungual debris c/w onychomycosis     Umbilicated papule    Erythematous-base heme-crusted tan verrucoid plaque consistent with inflamed seborrheic keratosis     Erythematous Silvery Scaling Plaque c/w Psoriasis     See annotation    Assessment/Plan:    Diagnoses and all orders for this visit:    Infection involving suture with abscess  - lesion fully expressed and all purulent suture  debris removed  -     mupirocin (BACTROBAN) 2 % ointment; Apply topically 3 (three) times daily.  -     Aerobic culture  -  pt. is possibly starting abx for UTI - no orals at this time - should resolve with drainage and topical abx.     Follow up PRN if lesion fails to resolve       Marshall Salinas MD

## 2023-11-30 ENCOUNTER — PATIENT MESSAGE (OUTPATIENT)
Dept: DERMATOLOGY | Facility: CLINIC | Age: 88
End: 2023-11-30
Payer: MEDICARE

## 2023-11-30 ENCOUNTER — TELEPHONE (OUTPATIENT)
Dept: DERMATOLOGY | Facility: CLINIC | Age: 88
End: 2023-11-30
Payer: MEDICARE

## 2023-11-30 LAB
BACTERIA SPEC AEROBE CULT: ABNORMAL
BACTERIA UR CULT: ABNORMAL

## 2023-12-01 ENCOUNTER — TELEPHONE (OUTPATIENT)
Dept: URGENT CARE | Facility: CLINIC | Age: 88
End: 2023-12-01
Payer: MEDICARE

## 2023-12-01 NOTE — TELEPHONE ENCOUNTER
Patient's niece Kellie notified of urine culture results for E coli at patient's request.   Patient was put on Keflex which is appropriate.  Niece reports that she is doing much better.

## 2023-12-11 ENCOUNTER — OFFICE VISIT (OUTPATIENT)
Dept: FAMILY MEDICINE | Facility: CLINIC | Age: 88
End: 2023-12-11
Payer: MEDICARE

## 2023-12-11 VITALS
HEIGHT: 61 IN | BODY MASS INDEX: 25.51 KG/M2 | DIASTOLIC BLOOD PRESSURE: 54 MMHG | HEART RATE: 68 BPM | WEIGHT: 135.13 LBS | SYSTOLIC BLOOD PRESSURE: 138 MMHG

## 2023-12-11 DIAGNOSIS — E78.2 MIXED HYPERLIPIDEMIA: ICD-10-CM

## 2023-12-11 DIAGNOSIS — S09.90XS INJURY OF HEAD, SEQUELA: ICD-10-CM

## 2023-12-11 DIAGNOSIS — Z09 HOSPITAL DISCHARGE FOLLOW-UP: Primary | ICD-10-CM

## 2023-12-11 DIAGNOSIS — L21.9 SEBORRHEIC DERMATITIS: ICD-10-CM
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
## 2023-12-11 DIAGNOSIS — E03.4 HYPOTHYROIDISM DUE TO ACQUIRED ATROPHY OF THYROID: ICD-10-CM
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
## 2023-12-11 DIAGNOSIS — I10 ESSENTIAL HYPERTENSION: Chronic | ICD-10-CM

## 2023-12-11 PROCEDURE — 1100F PTFALLS ASSESS-DOCD GE2>/YR: CPT | Mod: HCNC,CPTII,S$GLB, | Performed by: NURSE PRACTITIONER

## 2023-12-11 PROCEDURE — 1126F PR PAIN SEVERITY QUANTIFIED, NO PAIN PRESENT: ICD-10-PCS | Mod: HCNC,CPTII,S$GLB, | Performed by: NURSE PRACTITIONER

## 2023-12-11 PROCEDURE — 1159F MED LIST DOCD IN RCRD: CPT | Mod: HCNC,CPTII,S$GLB, | Performed by: NURSE PRACTITIONER

## 2023-12-11 PROCEDURE — 1100F PR PT FALLS ASSESS DOC 2+ FALLS/FALL W/INJURY/YR: ICD-10-PCS | Mod: HCNC,CPTII,S$GLB, | Performed by: NURSE PRACTITIONER

## 2023-12-11 PROCEDURE — 99214 PR OFFICE/OUTPT VISIT, EST, LEVL IV, 30-39 MIN: ICD-10-PCS | Mod: HCNC,S$GLB,, | Performed by: NURSE PRACTITIONER

## 2023-12-11 PROCEDURE — 1160F PR REVIEW ALL MEDS BY PRESCRIBER/CLIN PHARMACIST DOCUMENTED: ICD-10-PCS | Mod: HCNC,CPTII,S$GLB, | Performed by: NURSE PRACTITIONER

## 2023-12-11 PROCEDURE — 1126F AMNT PAIN NOTED NONE PRSNT: CPT | Mod: HCNC,CPTII,S$GLB, | Performed by: NURSE PRACTITIONER

## 2023-12-11 PROCEDURE — 99999 PR PBB SHADOW E&M-EST. PATIENT-LVL IV: CPT | Mod: PBBFAC,HCNC,, | Performed by: NURSE PRACTITIONER

## 2023-12-11 PROCEDURE — 3288F FALL RISK ASSESSMENT DOCD: CPT | Mod: HCNC,CPTII,S$GLB, | Performed by: NURSE PRACTITIONER

## 2023-12-11 PROCEDURE — 1159F PR MEDICATION LIST DOCUMENTED IN MEDICAL RECORD: ICD-10-PCS | Mod: HCNC,CPTII,S$GLB, | Performed by: NURSE PRACTITIONER

## 2023-12-11 PROCEDURE — 99999 PR PBB SHADOW E&M-EST. PATIENT-LVL IV: ICD-10-PCS | Mod: PBBFAC,HCNC,, | Performed by: NURSE PRACTITIONER

## 2023-12-11 PROCEDURE — 3288F PR FALLS RISK ASSESSMENT DOCUMENTED: ICD-10-PCS | Mod: HCNC,CPTII,S$GLB, | Performed by: NURSE PRACTITIONER

## 2023-12-11 PROCEDURE — 1160F RVW MEDS BY RX/DR IN RCRD: CPT | Mod: HCNC,CPTII,S$GLB, | Performed by: NURSE PRACTITIONER

## 2023-12-11 PROCEDURE — 99214 OFFICE O/P EST MOD 30 MIN: CPT | Mod: HCNC,S$GLB,, | Performed by: NURSE PRACTITIONER

## 2023-12-11 RX ORDER — KETOCONAZOLE 20 MG/ML
SHAMPOO, SUSPENSION TOPICAL
Qty: 120 ML | Refills: 1 | Status: SHIPPED | OUTPATIENT
Start: 2023-12-11 | End: 2024-01-10

## 2023-12-11 RX ORDER — INFLUENZA A VIRUS A/VICTORIA/4897/2022 IVR-238 (H1N1) ANTIGEN (FORMALDEHYDE INACTIVATED), INFLUENZA A VIRUS A/DARWIN/6/2021 IVR-227 (H3N2) ANTIGEN (FORMALDEHYDE INACTIVATED), INFLUENZA B VIRUS B/AUSTRIA/1359417/2021 BVR-26 ANTIGEN (FORMALDEHYDE INACTIVATED), INFLUENZA B VIRUS B/PHUKET/3073/2013 BVR-1B ANTIGEN (FORMALDEHYDE INACTIVATED) 15; 15; 15; 15 UG/.5ML; UG/.5ML; UG/.5ML; UG/.5ML
INJECTION, SUSPENSION INTRAMUSCULAR
COMMUNITY
Start: 2023-10-18

## 2023-12-11 NOTE — PATIENT INSTRUCTIONS
Monty Ya,     If you are due for any health screening(s) below please notify me so we can arrange them to be ordered and scheduled. Most healthy patients at your age complete them, but you are free to accept or refuse.     If you can't do it, I'll definitely understand. If you can, I'd certainly appreciate it!    All of your core healthy metrics are met.

## 2023-12-11 NOTE — PROGRESS NOTES
Subjective     Patient ID: Bhakti Phipps is a 89 y.o. female.    Chief Complaint: Hospital Follow Up (For a fall)    HPI  Seen in ED s/p fall on 11/28/23 and advised to f/u with PCP upon discharge. Reports that she tripped while walking outside her home and landed on her face. Denies loc, reports that the swelling to her forehead and right eye has resolved, but still has some bruising below right eye.  Blood pressure was greatly elevated while in hospital. Reports no elevated blood pressure since discharge. Reports taking medications as prescribed. Pt is requesting refill of Ketoconozole shampoo.   BP Readings from Last 3 Encounters:   12/11/23 (!) 138/54   11/28/23 (!) 184/71   11/27/23 (!) 159/72      See ED course below:   ED Course as of 11/28/23 1648   Tue Nov 28, 2023   1646 Patient is stable and well appearing at this time.  Fall was mechanical.  CTs are reassuring at this time.  Patient is neurologically intact, no signs of concussion or other musculoskeletal injury.  Will be discharged home.  Blood pressure on discharge 184/71.  Think blood pressure is elevated at this time due to some pain.  She feels well at this time however.  She voices understanding agreement return precautions in the follow-up plan. [TB]       ED Course User Index  [TB] Tay Biggs MD        Clinical Impression:  Final diagnoses:  [S09.90XA] Injury of head, initial encounter (Primary)  [T07.XXXA] Abrasions of multiple sites     Review of Systems   Constitutional:  Negative for activity change and unexpected weight change.   HENT:  Positive for hearing loss (wears hearing aids). Negative for rhinorrhea and trouble swallowing.    Eyes:  Negative for discharge and visual disturbance.   Respiratory:  Negative for chest tightness and wheezing.    Cardiovascular:  Negative for chest pain and palpitations.   Gastrointestinal:  Negative for blood in stool, constipation, diarrhea and vomiting.   Endocrine: Negative for polydipsia  and polyuria.   Genitourinary:  Negative for difficulty urinating, dysuria, hematuria and menstrual problem.   Musculoskeletal:  Positive for arthralgias. Negative for joint swelling and neck pain.   Neurological:  Negative for weakness and headaches.   Psychiatric/Behavioral:  Negative for confusion and dysphoric mood.        Medication List with Changes/Refills   Current Medications    B COMPLEX VITAMINS TABLET    Take 1 tablet by mouth once daily.    CHOLECALCIFEROL, VITAMIN D3, 100 MCG (4,000 UNIT) CAP CAPSULE    Take by mouth once daily.    FLUAD QUAD 2023-24,65Y UP,,PF, 60 MCG (15 MCG X 4)/0.5 ML SYRG        HYDROCHLOROTHIAZIDE (HYDRODIURIL) 12.5 MG TAB    TAKE 1 TABLET BY MOUTH ONCE DAILY.    LEVOTHYROXINE (SYNTHROID) 125 MCG TABLET    TAKE 1 TABLET EVERY DAY    MAGNESIUM OXIDE 500 MG TAB    Take 1 tablet by mouth once daily.    MUPIROCIN (BACTROBAN) 2 % OINTMENT    Apply topically 3 (three) times daily.    PROPRANOLOL (INDERAL) 20 MG TABLET    TAKE 1 TABLET TWICE DAILY    VENLAFAXINE (EFFEXOR-XR) 150 MG CP24    TAKE 1 CAPSULE ONE TIME DAILY    VIT C/E/ZN/COPPR/LUTEIN/ZEAXAN (PRESERVISION AREDS 2 ORAL)    Take by mouth.   Changed and/or Refilled Medications    Modified Medication Previous Medication    KETOCONAZOLE (NIZORAL) 2 % SHAMPOO ketoconazole (NIZORAL) 2 % shampoo       Apply topically twice a week.    Apply topically twice a week.   Discontinued Medications    BISACODYL 5 MG TAB    1-3 tablet DAILY (route: oral)    ESTRADIOL (ESTRACE) 0.01 % (0.1 MG/GRAM) VAGINAL CREAM    PLACE 1 GRAM (1/4 APPLICATORFUL = 1GRAM) VAGINALLY TWICE A WEEK (DX. STRESS INCONTINENCE WITH ATROPHIC VAGINITIS)    MECLIZINE (ANTIVERT) 12.5 MG TABLET    Take 1 tablet (12.5 mg total) by mouth 3 (three) times daily as needed for Nausea or Dizziness.    TEMAZEPAM (RESTORIL) 7.5 MG CAP    Take 1 capsule (7.5 mg total) by mouth nightly as needed (Insomnia).           Objective     Physical Exam  Vitals and nursing note reviewed.    Constitutional:       General: She is not in acute distress.     Appearance: Normal appearance.   HENT:      Head: Normocephalic.      Right Ear: Tympanic membrane normal.      Left Ear: Tympanic membrane normal.      Nose: Nose normal. No rhinorrhea.      Mouth/Throat:      Mouth: Mucous membranes are moist.      Pharynx: Oropharynx is clear.   Eyes:      General: Lids are normal. Lids are everted, no foreign bodies appreciated.      Extraocular Movements: Extraocular movements intact.      Conjunctiva/sclera: Conjunctivae normal.      Pupils: Pupils are equal, round, and reactive to light.        Comments: Ecchymosis proximal to right lower lid.    Cardiovascular:      Rate and Rhythm: Normal rate and regular rhythm.      Heart sounds: Normal heart sounds.   Pulmonary:      Effort: Pulmonary effort is normal. No respiratory distress.      Breath sounds: Normal breath sounds.   Abdominal:      Tenderness: There is no abdominal tenderness.   Musculoskeletal:         General: Normal range of motion.      Cervical back: Normal range of motion and neck supple.      Right lower leg: No edema.      Left lower leg: No edema.   Lymphadenopathy:      Cervical: No cervical adenopathy.   Skin:     General: Skin is warm and dry.   Neurological:      General: No focal deficit present.      Mental Status: She is alert and oriented to person, place, and time.      Cranial Nerves: Cranial nerves 2-12 are intact.      Motor: Motor function is intact.      Coordination: Coordination is intact.   Psychiatric:         Mood and Affect: Mood normal.         Behavior: Behavior normal.         Thought Content: Thought content normal.            Assessment and Plan     1. Hospital discharge follow-up  Comments:  discharge  summary reviewed.    2. Injury of head, sequela    3. Fall at home, sequela  Comments:  advised on fall prevention    4. Essential hypertension  Comments:  Continue medications.  Labs ordered.  Follow up with PCP  scheduled in January  Orders:  -     COMPREHENSIVE METABOLIC PANEL; Future; Expected date: 12/11/2023    5. Seborrheic dermatitis  -     ketoconazole (NIZORAL) 2 % shampoo; Apply topically twice a week.  Dispense: 120 mL; Refill: 1    6. Mixed hyperlipidemia  -     COMPREHENSIVE METABOLIC PANEL; Future; Expected date: 12/11/2023  -     LIPID PANEL; Future; Expected date: 12/11/2023    7. Hypothyroidism due to acquired atrophy of thyroid  -     TSH; Future; Expected date: 12/11/2023               No follow-ups on file.    YESSI Minaya

## 2023-12-16 DIAGNOSIS — I10 PRIMARY HYPERTENSION: ICD-10-CM

## 2023-12-16 NOTE — TELEPHONE ENCOUNTER
No care due was identified.  [facility] Embedded Care Due Messages. Reference number: [id number].   12/16/2023 3:33:13 AM CST

## 2023-12-18 ENCOUNTER — PATIENT MESSAGE (OUTPATIENT)
Dept: RHEUMATOLOGY | Facility: CLINIC | Age: 88
End: 2023-12-18

## 2023-12-18 ENCOUNTER — PATIENT OUTREACH (OUTPATIENT)
Dept: RHEUMATOLOGY | Facility: CLINIC | Age: 88
End: 2023-12-18
Payer: MEDICARE

## 2023-12-18 DIAGNOSIS — M15.9 PRIMARY OSTEOARTHRITIS INVOLVING MULTIPLE JOINTS: ICD-10-CM

## 2023-12-18 DIAGNOSIS — M13.0 POLYARTHRITIS: ICD-10-CM

## 2023-12-18 DIAGNOSIS — M41.25 OTHER IDIOPATHIC SCOLIOSIS, THORACOLUMBAR REGION: Primary | ICD-10-CM

## 2023-12-18 PROCEDURE — 99358 PR PROLONGED SERV,NO CONTACT,1ST HR: ICD-10-PCS | Mod: S$GLB,,, | Performed by: INTERNAL MEDICINE

## 2023-12-18 PROCEDURE — 99358 PROLONG SERVICE W/O CONTACT: CPT | Mod: S$GLB,,, | Performed by: INTERNAL MEDICINE

## 2023-12-18 RX ORDER — PREDNISONE 5 MG/1
TABLET ORAL
Qty: 18 TABLET | Refills: 0 | Status: SHIPPED | OUTPATIENT
Start: 2023-12-18

## 2023-12-18 NOTE — PROGRESS NOTES
Patient with flare of hand with swelling following recent UTI rx with ABX and recent fall seen in the hospital.   Will try predniosne she is not a diabetic  We recently had to decrease relafen for her renal function.   She tried APAP as advised on last visit 11/2023 did not help.

## 2023-12-19 RX ORDER — HYDROCHLOROTHIAZIDE 12.5 MG/1
12.5 TABLET ORAL
Qty: 90 TABLET | Refills: 0 | Status: SHIPPED | OUTPATIENT
Start: 2023-12-19 | End: 2024-02-28

## 2023-12-20 ENCOUNTER — PATIENT MESSAGE (OUTPATIENT)
Dept: FAMILY MEDICINE | Facility: CLINIC | Age: 88
End: 2023-12-20
Payer: MEDICARE

## 2023-12-31 ENCOUNTER — PATIENT MESSAGE (OUTPATIENT)
Dept: RHEUMATOLOGY | Facility: CLINIC | Age: 88
End: 2023-12-31
Payer: MEDICARE

## 2024-01-04 ENCOUNTER — OFFICE VISIT (OUTPATIENT)
Dept: RHEUMATOLOGY | Facility: CLINIC | Age: 89
End: 2024-01-04
Payer: MEDICARE

## 2024-01-04 VITALS
HEART RATE: 90 BPM | BODY MASS INDEX: 25.37 KG/M2 | HEIGHT: 61 IN | WEIGHT: 134.38 LBS | SYSTOLIC BLOOD PRESSURE: 159 MMHG | DIASTOLIC BLOOD PRESSURE: 65 MMHG

## 2024-01-04 DIAGNOSIS — M15.9 PRIMARY OSTEOARTHRITIS INVOLVING MULTIPLE JOINTS: Primary | ICD-10-CM

## 2024-01-04 PROCEDURE — 99214 OFFICE O/P EST MOD 30 MIN: CPT | Mod: HCNC,S$GLB,, | Performed by: INTERNAL MEDICINE

## 2024-01-04 PROCEDURE — 99999 PR PBB SHADOW E&M-EST. PATIENT-LVL III: CPT | Mod: PBBFAC,HCNC,, | Performed by: INTERNAL MEDICINE

## 2024-01-04 PROCEDURE — 1100F PTFALLS ASSESS-DOCD GE2>/YR: CPT | Mod: HCNC,CPTII,S$GLB, | Performed by: INTERNAL MEDICINE

## 2024-01-04 PROCEDURE — 1159F MED LIST DOCD IN RCRD: CPT | Mod: HCNC,CPTII,S$GLB, | Performed by: INTERNAL MEDICINE

## 2024-01-04 PROCEDURE — 3288F FALL RISK ASSESSMENT DOCD: CPT | Mod: HCNC,CPTII,S$GLB, | Performed by: INTERNAL MEDICINE

## 2024-01-04 PROCEDURE — 1125F AMNT PAIN NOTED PAIN PRSNT: CPT | Mod: HCNC,CPTII,S$GLB, | Performed by: INTERNAL MEDICINE

## 2024-01-04 RX ORDER — DICLOFENAC SODIUM 10 MG/G
2 GEL TOPICAL 3 TIMES DAILY PRN
Qty: 100 G | Refills: 6 | Status: SHIPPED | OUTPATIENT
Start: 2024-01-04 | End: 2025-01-03

## 2024-01-04 RX ORDER — ACETAMINOPHEN AND CODEINE PHOSPHATE 300; 30 MG/1; MG/1
1 TABLET ORAL DAILY PRN
Qty: 20 TABLET | Refills: 3 | Status: SHIPPED | OUTPATIENT
Start: 2024-01-04

## 2024-01-04 RX ORDER — ESTRADIOL 0.1 MG/G
CREAM VAGINAL
COMMUNITY
Start: 2023-12-23

## 2024-01-04 ASSESSMENT — ROUTINE ASSESSMENT OF PATIENT INDEX DATA (RAPID3)
FATIGUE SCORE: 0
PATIENT GLOBAL ASSESSMENT SCORE: 5.5
PAIN SCORE: 5
PSYCHOLOGICAL DISTRESS SCORE: 0
MDHAQ FUNCTION SCORE: 0.8
TOTAL RAPID3 SCORE: 4.39

## 2024-01-04 NOTE — PROGRESS NOTES
Subjective:          Chief Complaint: Bhakti Phipps is a 89 y.o. female who had concerns including Disease Management.    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     Patient is an 88-year-old female she was referred by Dr. Li to me first seen 8/20222 due to arthritis and concern for possible inflammatory arthritis. She also received injections within the 3rd MCP with Celestone own and bupivacaine 11/23/2021. Patient states the right 3rd MCP is first joint with severe swelling. She notes pain in various joints MCP and PIP mostly w/ left 1st IP deformed from injury with dog bite.   Serologies negative.   Inflammatory markers normal    She denies morning stiffness, notes worse with use. No pain with rest and much better with Relafen 500mg BID. No Gi upset. Patient's last renal function shows a creatinine that is within normal limits, but a slight decline in her renal fucntion.   She take ones tylenol PM at night.   Patient notes left sided LBP, s/p left TKA (2006) this has done very well. She denies PUD, GI bleed or renal disease.     Imaging studies which I cannot review but were included in Dr. Mitchell's notes show AP lateral oblique images of the right hand with significant arthritic changes particularly noticed in the right MCP joints.    Denies  symptoms of Raynaud's, fever, chills , dry mouth, dry eyes, dysphagia, tight  skin, signs of pleurisy , pericarditis, rashes/photosensitivity, oral ulcers,  chest pain, shortness of breath, GI complaints/IBD,  urinary complaints or hx of proteinura/nephritis,  alopecia , fatigue, photosensitivity, headache, change in vision, face & jaw pain, ,psoriasis, numbness, anxiety, abdomen pain, nausea, vomiting. No hx of DVT, PE, miscarriages where applicable.      Patient with macular degeneration receives injections for this.   + Sulfa allergy.     1/2024:   Patient has a delightful 89-year-old female unfortunately at our last visit November we had to discontinue  Relafen due to a declining GFR in the interim she is having more difficulty with her hands rates it a /10 Tylenol 650 that was not helping I gave her a prednisone taper as of 2023 she returns today with continued pain in the fingers.  Recall above she has macular degeneration with injections and she has a sulfa allergy making my options quite limited  REVIEW OF SYSTEMS:    Review of Systems   Constitutional:  Negative for fever.   Eyes:  Positive for redness.   Respiratory:  Negative for cough and shortness of breath.    Cardiovascular:  Negative for chest pain.   Gastrointestinal:  Negative for constipation and diarrhea.   Genitourinary:  Negative for dysuria.   Musculoskeletal:  Positive for back pain and joint pain.   Skin:  Negative for rash.   Neurological:  Negative for headaches.   Endo/Heme/Allergies:  Does not bruise/bleed easily.               Objective:            Past Medical History:   Diagnosis Date    Anxiety     Arthritis     Basal cell carcinoma of skin of other and unspecified parts of face 2012    Dx updated per  IMO Load    Essential hypertension 2012    History of melanoma     at age of 26    Hyperlipidemia 11/10/2018    Hypertension     Hypothyroidism due to acquired atrophy of thyroid 2012    Idiopathic scoliosis of thoracolumbar spine 2019    Insomnia 2014    Intermediate stage nonexudative age-related macular degeneration of both eyes 2018    Melanoma     age 26    Osteoarthritis 2012    Postmenopausal 2019    Stress incontinence 2018    Tortuous aorta 2015    Tremor      Family History   Problem Relation Age of Onset    Pancreatic cancer Mother     Leukemia Father     Lung cancer Brother     Liver cancer Sister      Social History     Tobacco Use    Smoking status: Former     Current packs/day: 0.00     Types: Cigarettes     Quit date: 1975     Years since quittin.6    Smokeless tobacco: Never   Substance Use  Topics    Alcohol use: No    Drug use: No         Current Outpatient Medications on File Prior to Visit   Medication Sig Dispense Refill    b complex vitamins tablet Take 1 tablet by mouth once daily.      cholecalciferol, vitamin D3, 100 mcg (4,000 unit) Cap capsule Take by mouth once daily.      estradioL (ESTRACE) 0.01 % (0.1 mg/gram) vaginal cream Place vaginally.      FLUAD QUAD 2023-24,65Y UP,,PF, 60 mcg (15 mcg x 4)/0.5 mL Syrg       hydroCHLOROthiazide (HYDRODIURIL) 12.5 MG Tab TAKE 1 TABLET EVERY DAY 90 tablet 0    ketoconazole (NIZORAL) 2 % shampoo Apply topically twice a week. 120 mL 1    levothyroxine (SYNTHROID) 125 MCG tablet TAKE 1 TABLET EVERY DAY 90 tablet 1    magnesium oxide 500 mg Tab Take 1 tablet by mouth once daily.      mupirocin (BACTROBAN) 2 % ointment Apply topically 3 (three) times daily. 22 g 3    predniSONE (DELTASONE) 5 MG tablet Prednisone 15mg daily x 3 days, then 10mg daily x 3 days, then 5mg daily x 3 days 18 tablet 0    propranoloL (INDERAL) 20 MG tablet TAKE 1 TABLET TWICE DAILY 180 tablet 3    venlafaxine (EFFEXOR-XR) 150 MG Cp24 TAKE 1 CAPSULE ONE TIME DAILY 90 capsule 3    VIT C/E/ZN/COPPR/LUTEIN/ZEAXAN (PRESERVISION AREDS 2 ORAL) Take by mouth.       No current facility-administered medications on file prior to visit.       Vitals:    01/04/24 1003   BP: (!) 159/65   Pulse: 90       Physical Exam:    Physical Exam  Constitutional:       Appearance: She is well-developed.   HENT:      Head: Normocephalic and atraumatic.   Eyes:      Pupils: Pupils are equal, round, and reactive to light.   Cardiovascular:      Rate and Rhythm: Normal rate and regular rhythm.      Heart sounds: Normal heart sounds.   Pulmonary:      Effort: Pulmonary effort is normal.      Breath sounds: Normal breath sounds.   Musculoskeletal:      Right shoulder: No swelling or tenderness. Normal range of motion.      Left shoulder: No swelling or tenderness. Normal range of motion.      Right elbow: No  swelling. Normal range of motion. No tenderness.      Left elbow: No swelling. Normal range of motion. No tenderness.      Right wrist: No swelling or tenderness. Decreased range of motion.      Left wrist: No swelling or tenderness. Decreased range of motion.      Right hand: Deformity and tenderness present. No swelling. Decreased range of motion.      Left hand: Deformity and tenderness present. No swelling. Decreased range of motion.      Cervical back: Normal range of motion.      Right knee: No swelling. Normal range of motion. No tenderness.      Left knee: No swelling. Normal range of motion. No tenderness.      Right foot: Normal range of motion. No swelling or tenderness.      Left foot: Normal range of motion. No swelling or tenderness.      Comments: Bony hypertrophy with some deformity of the PIP and DIP joints, no swelling or tenderness at the MCP joints.        Skin:     General: Skin is warm and dry.   Neurological:      Mental Status: She is alert and oriented to person, place, and time.   Psychiatric:         Behavior: Behavior normal.           Rapid3 Question Responses and Scores 12/30/2022   MDHAQ Score 1.4   Psychologic Score 3.3   Pain Score 4.5   When you awakened in the morning OVER THE LAST WEEK, did you feel stiff? Yes   If Yes, please indicate the number of hours until you are as limber as you will be for the day 2   Fatigue Score 3   Global Health Score 6   RAPID3 Score 5.06     Rapid3 Question Responses and Scores 5/1/2023   MDHAQ Score 0.8   Psychologic Score 2.2   Pain Score 4   When you awakened in the morning OVER THE LAST WEEK, did you feel stiff? No   If Yes, please indicate the number of hours until you are as limber as you will be for the day -   Fatigue Score 2   Global Health Score 5   RAPID3 Score 3.89           Assessment:       No diagnosis found.               Plan:        There are no diagnoses linked to this encounter.        Patient is an 87 y/o female with MCP, PIP and  some DIP arthritis. She developed swelling of the MCP acutely and noted by hand surgeon to have significant MCP arthritis thusly referred to Rheumatology for eval of possible inflammatory arthritis.   No features of SLE, no Ps or IBD, will r/o RA     For now:  Ok for Voltaren GEL TID PRN  May use topical voltaren Gel with Tylenol 650mg up to 3 times daily.   If above not helping you can try taking Tylenol #3 with codeine as needed once daily.           No follow-ups on file.    30min consultation with greater than 50% spent in counseling, chart review and coordination of care. All questions answered.  Thank you for allowing me to participate in the care of this very pleasant patient.

## 2024-01-04 NOTE — PATIENT INSTRUCTIONS
For now:  Ok for Voltaren GEL TID PRN  May use topical voltaren Gel with Tylenol Arthrits 650mg up to 3 times daily.   If above not helping you can try taking Tylenol #3 with codeine as needed once daily.

## 2024-01-17 ENCOUNTER — LAB VISIT (OUTPATIENT)
Dept: LAB | Facility: HOSPITAL | Age: 89
End: 2024-01-17
Attending: NURSE PRACTITIONER
Payer: MEDICARE

## 2024-01-17 DIAGNOSIS — E78.2 MIXED HYPERLIPIDEMIA: ICD-10-CM

## 2024-01-17 DIAGNOSIS — I10 ESSENTIAL HYPERTENSION: Chronic | ICD-10-CM

## 2024-01-17 DIAGNOSIS — E03.4 HYPOTHYROIDISM DUE TO ACQUIRED ATROPHY OF THYROID: ICD-10-CM

## 2024-01-17 LAB
ALBUMIN SERPL BCP-MCNC: 4.1 G/DL (ref 3.5–5.2)
ALP SERPL-CCNC: 71 U/L (ref 55–135)
ALT SERPL W/O P-5'-P-CCNC: 14 U/L (ref 10–44)
ANION GAP SERPL CALC-SCNC: 13 MMOL/L (ref 8–16)
AST SERPL-CCNC: 16 U/L (ref 10–40)
BILIRUB SERPL-MCNC: 0.4 MG/DL (ref 0.1–1)
BUN SERPL-MCNC: 23 MG/DL (ref 8–23)
CALCIUM SERPL-MCNC: 10.2 MG/DL (ref 8.7–10.5)
CHLORIDE SERPL-SCNC: 104 MMOL/L (ref 95–110)
CHOLEST SERPL-MCNC: 187 MG/DL (ref 120–199)
CHOLEST/HDLC SERPL: 4.3 {RATIO} (ref 2–5)
CO2 SERPL-SCNC: 24 MMOL/L (ref 23–29)
CREAT SERPL-MCNC: 0.8 MG/DL (ref 0.5–1.4)
EST. GFR  (NO RACE VARIABLE): >60 ML/MIN/1.73 M^2
GLUCOSE SERPL-MCNC: 92 MG/DL (ref 70–110)
HDLC SERPL-MCNC: 44 MG/DL (ref 40–75)
HDLC SERPL: 23.5 % (ref 20–50)
LDLC SERPL CALC-MCNC: 101.6 MG/DL (ref 63–159)
NONHDLC SERPL-MCNC: 143 MG/DL
POTASSIUM SERPL-SCNC: 4.2 MMOL/L (ref 3.5–5.1)
PROT SERPL-MCNC: 7.3 G/DL (ref 6–8.4)
SODIUM SERPL-SCNC: 141 MMOL/L (ref 136–145)
TRIGL SERPL-MCNC: 207 MG/DL (ref 30–150)
TSH SERPL DL<=0.005 MIU/L-ACNC: 1.48 UIU/ML (ref 0.4–4)

## 2024-01-17 PROCEDURE — 84443 ASSAY THYROID STIM HORMONE: CPT | Mod: HCNC | Performed by: NURSE PRACTITIONER

## 2024-01-17 PROCEDURE — 80061 LIPID PANEL: CPT | Mod: HCNC | Performed by: NURSE PRACTITIONER

## 2024-01-17 PROCEDURE — 36415 COLL VENOUS BLD VENIPUNCTURE: CPT | Mod: HCNC,PN | Performed by: NURSE PRACTITIONER

## 2024-01-17 PROCEDURE — 80053 COMPREHEN METABOLIC PANEL: CPT | Mod: HCNC | Performed by: NURSE PRACTITIONER

## 2024-01-24 ENCOUNTER — PATIENT MESSAGE (OUTPATIENT)
Dept: OTHER | Facility: OTHER | Age: 89
End: 2024-01-24
Payer: MEDICARE

## 2024-01-24 ENCOUNTER — OFFICE VISIT (OUTPATIENT)
Dept: FAMILY MEDICINE | Facility: CLINIC | Age: 89
End: 2024-01-24
Payer: MEDICARE

## 2024-01-24 VITALS
SYSTOLIC BLOOD PRESSURE: 150 MMHG | HEIGHT: 61 IN | BODY MASS INDEX: 25.51 KG/M2 | DIASTOLIC BLOOD PRESSURE: 62 MMHG | WEIGHT: 135.13 LBS | OXYGEN SATURATION: 97 % | RESPIRATION RATE: 16 BRPM | HEART RATE: 66 BPM

## 2024-01-24 DIAGNOSIS — I10 ESSENTIAL HYPERTENSION: Primary | ICD-10-CM

## 2024-01-24 DIAGNOSIS — G47.01 INSOMNIA DUE TO MEDICAL CONDITION: ICD-10-CM

## 2024-01-24 PROCEDURE — 1160F RVW MEDS BY RX/DR IN RCRD: CPT | Mod: HCNC,CPTII,S$GLB, | Performed by: INTERNAL MEDICINE

## 2024-01-24 PROCEDURE — 1100F PTFALLS ASSESS-DOCD GE2>/YR: CPT | Mod: HCNC,CPTII,S$GLB, | Performed by: INTERNAL MEDICINE

## 2024-01-24 PROCEDURE — 1159F MED LIST DOCD IN RCRD: CPT | Mod: HCNC,CPTII,S$GLB, | Performed by: INTERNAL MEDICINE

## 2024-01-24 PROCEDURE — 3288F FALL RISK ASSESSMENT DOCD: CPT | Mod: HCNC,CPTII,S$GLB, | Performed by: INTERNAL MEDICINE

## 2024-01-24 PROCEDURE — 99214 OFFICE O/P EST MOD 30 MIN: CPT | Mod: HCNC,S$GLB,, | Performed by: INTERNAL MEDICINE

## 2024-01-24 PROCEDURE — 99999 PR PBB SHADOW E&M-EST. PATIENT-LVL IV: CPT | Mod: PBBFAC,HCNC,, | Performed by: INTERNAL MEDICINE

## 2024-01-24 RX ORDER — TEMAZEPAM 7.5 MG/1
7.5 CAPSULE ORAL NIGHTLY PRN
Qty: 15 CAPSULE | Refills: 0 | Status: SHIPPED | OUTPATIENT
Start: 2024-01-24 | End: 2024-02-23

## 2024-01-24 NOTE — PROGRESS NOTES
Assessment and Plan:    1. Essential hypertension  Reports better control at home, but high readings here. Historically has gotten hypotensive if medication doses increased. Will start monitoring with digital HTN program.  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 Substantially decreased dose of the last several years.  - temazepam (RESTORIL) 7.5 MG Cap; Take 1 capsule (7.5 mg total) by mouth nightly as needed (Insomnia).  Dispense: 15 capsule; Refill: 0    ______________________________________________________________________  Subjective:    Chief Complaint:  Follow up chronic medical conditions.    HPI:  Bhakti is a 89 y.o. year old female here to follow up chronic medical conditions.     HTN- Taking HCTZ 12.5 and propranolol 20 mg BID. BP has been higher on multiple recent checks, but she reports that this has been lower at home. In the past, she has gotten hypotensive when HCTZ increased.      Insomnia- Prescribed temazepam PRN for insomnia, she notes that she really hasn't been taking this as she didn't want to pay the copay, but she would now like a prescription again for rare use. Has only used 30 pills since April of 2023. She uses melatonin PRN.      Anxiety- Taking venlafaxine 150 mg daily. Doing well.       Hypothyroidism- Taking levothyroxine 125 mcg daily.     Sees Rheumatology for osteoarthritis, no signs of inflammatory arthritis.     Medications:  Current Outpatient Medications on File Prior to Visit   Medication Sig Dispense Refill    acetaminophen-codeine 300-30mg (TYLENOL #3) 300-30 mg Tab Take 1 tablet by mouth daily as needed (arthritis flare). 20 tablet 3    b complex vitamins tablet Take 1 tablet by mouth once daily.      cholecalciferol, vitamin D3, 100 mcg (4,000 unit) Cap capsule Take by mouth once daily.      diclofenac sodium (VOLTAREN) 1 % Gel Apply 2 g topically 3 (three) times daily as needed (hand  arthritis). 100 g 6    estradioL (ESTRACE) 0.01 % (0.1 mg/gram) vaginal cream Place vaginally.      hydroCHLOROthiazide (HYDRODIURIL) 12.5 MG Tab TAKE 1 TABLET EVERY DAY 90 tablet 0    levothyroxine (SYNTHROID) 125 MCG tablet TAKE 1 TABLET EVERY DAY 90 tablet 1    magnesium oxide 500 mg Tab Take 1 tablet by mouth once daily.      propranoloL (INDERAL) 20 MG tablet TAKE 1 TABLET TWICE DAILY 180 tablet 3    venlafaxine (EFFEXOR-XR) 150 MG Cp24 TAKE 1 CAPSULE ONE TIME DAILY 90 capsule 3    VIT C/E/ZN/COPPR/LUTEIN/ZEAXAN (PRESERVISION AREDS 2 ORAL) Take by mouth.      FLUAD QUAD 2023-24,65Y UP,,PF, 60 mcg (15 mcg x 4)/0.5 mL Syrg       ketoconazole (NIZORAL) 2 % shampoo Apply topically twice a week. 120 mL 1    mupirocin (BACTROBAN) 2 % ointment Apply topically 3 (three) times daily. (Patient not taking: Reported on 1/24/2024) 22 g 3    predniSONE (DELTASONE) 5 MG tablet Prednisone 15mg daily x 3 days, then 10mg daily x 3 days, then 5mg daily x 3 days (Patient not taking: Reported on 1/24/2024) 18 tablet 0     No current facility-administered medications on file prior to visit.       Review of Systems:  Review of Systems   Constitutional:  Negative for activity change and unexpected weight change.   HENT:  Positive for hearing loss. Negative for rhinorrhea and trouble swallowing.    Eyes:  Positive for visual disturbance. Negative for discharge.   Respiratory:  Negative for chest tightness and wheezing.    Cardiovascular:  Negative for chest pain and palpitations.   Gastrointestinal:  Positive for constipation. Negative for blood in stool, diarrhea and vomiting.   Endocrine: Negative for polydipsia and polyuria.   Genitourinary:  Negative for difficulty urinating and menstrual problem.   Musculoskeletal:  Positive for arthralgias and joint swelling. Negative for neck pain.   Neurological:  Negative for weakness and headaches.   Psychiatric/Behavioral:  Negative for confusion and dysphoric mood.      Entered by patient  "and reviewed and updated during visit      Past Medical History:  Past Medical History:   Diagnosis Date    Anxiety     Arthritis     Basal cell carcinoma of skin of other and unspecified parts of face 11/21/2012    Dx updated per 2019 IMO Load    Essential hypertension 05/21/2012    History of melanoma     at age of 26    Hyperlipidemia 11/10/2018    Hypertension     Hypothyroidism due to acquired atrophy of thyroid 05/21/2012    Idiopathic scoliosis of thoracolumbar spine 02/28/2019    Insomnia 02/25/2014    Intermediate stage nonexudative age-related macular degeneration of both eyes 04/12/2018    Melanoma     age 26    Osteoarthritis 05/21/2012    Postmenopausal 08/13/2019    Stress incontinence 04/12/2018    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    Tremor        Objective:    Vitals:  Vitals:    01/24/24 0953 01/24/24 1012   BP: (!) 154/62 (!) 150/62   Pulse: 66    Resp: 16    SpO2: 97%    Weight: 61.3 kg (135 lb 2.3 oz)    Height: 5' 1" (1.549 m)        Physical Exam  Vitals reviewed.   Constitutional:       General: She is not in acute distress.     Appearance: She is well-developed.   Eyes:      General:         Right eye: No discharge.         Left eye: No discharge.      Conjunctiva/sclera: Conjunctivae normal.   Cardiovascular:      Rate and Rhythm: Normal rate and regular rhythm.   Pulmonary:      Effort: Pulmonary effort is normal. No respiratory distress.   Skin:     General: Skin is warm and dry.   Neurological:      Mental Status: She is alert and oriented to person, place, and time.   Psychiatric:         Behavior: Behavior normal.         Thought Content: Thought content normal.         Judgment: Judgment normal.         Data:  TSH normal  TGs elevated  CMP unremarkable      Zoë Garcia MD  Internal Medicine    "

## 2024-01-25 ENCOUNTER — PATIENT MESSAGE (OUTPATIENT)
Dept: OTHER | Facility: OTHER | Age: 89
End: 2024-01-25
Payer: MEDICARE

## 2024-01-29 ENCOUNTER — PATIENT MESSAGE (OUTPATIENT)
Dept: FAMILY MEDICINE | Facility: CLINIC | Age: 89
End: 2024-01-29
Payer: MEDICARE

## 2024-02-05 ENCOUNTER — PATIENT MESSAGE (OUTPATIENT)
Dept: FAMILY MEDICINE | Facility: CLINIC | Age: 89
End: 2024-02-05
Payer: MEDICARE

## 2024-02-06 ENCOUNTER — PATIENT MESSAGE (OUTPATIENT)
Dept: FAMILY MEDICINE | Facility: CLINIC | Age: 89
End: 2024-02-06
Payer: MEDICARE

## 2024-02-07 ENCOUNTER — OFFICE VISIT (OUTPATIENT)
Dept: DERMATOLOGY | Facility: CLINIC | Age: 89
End: 2024-02-07
Payer: MEDICARE

## 2024-02-07 VITALS — BODY MASS INDEX: 25.51 KG/M2 | WEIGHT: 135.13 LBS | HEIGHT: 61 IN | RESPIRATION RATE: 18 BRPM

## 2024-02-07 DIAGNOSIS — D48.5 NEOPLASM OF UNCERTAIN BEHAVIOR OF SKIN: ICD-10-CM

## 2024-02-07 DIAGNOSIS — Z80.8 FAMILY HISTORY OF MALIGNANT MELANOMA: ICD-10-CM

## 2024-02-07 DIAGNOSIS — L57.0 AK (ACTINIC KERATOSIS): ICD-10-CM

## 2024-02-07 DIAGNOSIS — L82.0 INFLAMED SEBORRHEIC KERATOSIS: ICD-10-CM

## 2024-02-07 DIAGNOSIS — Z85.828 HISTORY OF NONMELANOMA SKIN CANCER: ICD-10-CM

## 2024-02-07 DIAGNOSIS — L81.4 LENTIGINES: Primary | ICD-10-CM

## 2024-02-07 DIAGNOSIS — L82.1 SK (SEBORRHEIC KERATOSIS): ICD-10-CM

## 2024-02-07 DIAGNOSIS — D18.01 CHERRY ANGIOMA: ICD-10-CM

## 2024-02-07 PROCEDURE — 88305 TISSUE EXAM BY PATHOLOGIST: CPT | Mod: 26,HCNC,, | Performed by: PATHOLOGY

## 2024-02-07 PROCEDURE — 17003 DESTRUCT PREMALG LES 2-14: CPT | Mod: HCNC,XS,S$GLB, | Performed by: DERMATOLOGY

## 2024-02-07 PROCEDURE — 17000 DESTRUCT PREMALG LESION: CPT | Mod: HCNC,XS,S$GLB, | Performed by: DERMATOLOGY

## 2024-02-07 PROCEDURE — 99213 OFFICE O/P EST LOW 20 MIN: CPT | Mod: 25,HCNC,S$GLB, | Performed by: DERMATOLOGY

## 2024-02-07 PROCEDURE — 1159F MED LIST DOCD IN RCRD: CPT | Mod: HCNC,CPTII,S$GLB, | Performed by: DERMATOLOGY

## 2024-02-07 PROCEDURE — 88305 TISSUE EXAM BY PATHOLOGIST: CPT | Mod: HCNC,PO | Performed by: PATHOLOGY

## 2024-02-07 PROCEDURE — 3288F FALL RISK ASSESSMENT DOCD: CPT | Mod: HCNC,CPTII,S$GLB, | Performed by: DERMATOLOGY

## 2024-02-07 PROCEDURE — 99999 PR PBB SHADOW E&M-EST. PATIENT-LVL III: CPT | Mod: PBBFAC,HCNC,, | Performed by: DERMATOLOGY

## 2024-02-07 PROCEDURE — 11102 TANGNTL BX SKIN SINGLE LES: CPT | Mod: HCNC,XS,S$GLB, | Performed by: DERMATOLOGY

## 2024-02-07 PROCEDURE — 1100F PTFALLS ASSESS-DOCD GE2>/YR: CPT | Mod: HCNC,CPTII,S$GLB, | Performed by: DERMATOLOGY

## 2024-02-07 PROCEDURE — 17110 DESTRUCTION B9 LES UP TO 14: CPT | Mod: HCNC,S$GLB,, | Performed by: DERMATOLOGY

## 2024-02-07 NOTE — PROGRESS NOTES
Subjective:      Patient ID:  Bhakti Phipps is a 89 y.o. female who presents for   Chief Complaint   Patient presents with    Follow-up     HPI    Established patient.  Here today for upper body skin exam.   Hx of MM, NMSC.     +MM  L neck with unknown characteristics, s/p WLE, bilateral lymph node dissection ~60 years ago    +NMSC  SCC at R cheek s/p Mohs 9/2023 (SK)  BCC at L preauricular s/p excision in 2012    Review of Systems    Objective:   Physical Exam   Constitutional: She appears well-developed and well-nourished. She is cooperative.   HENT:   Head: Normocephalic and atraumatic.   Eyes: Lids are normal. Lids are normal.  Right conjunctiva is not injected. Left conjunctiva is not injected. No conjunctival no injection.   Pulmonary/Chest: Effort normal. No respiratory distress.   Musculoskeletal: Lymphadenopathy:      Cervical: No cervical adenopathy.      Upper Body:   No axillary adenopathy present.No supraclavicular adenopathy is present.     Lymphadenopathy: No supraclavicular adenopathy is present.     She has no cervical adenopathy.     She has no axillary adenopathy.   Neurological: She is alert and oriented to person, place, and time.   Psychiatric: She has a normal mood and affect. Her speech is normal and behavior is normal. Mood, memory, affect and thought content normal.   Skin:   Areas Examined (abnormalities noted in diagram):   Scalp / Hair Palpated and Inspected  Head / Face Inspection Performed  Neck Inspection Performed  Chest / Axilla Inspection Performed  Abdomen Inspection Performed  Back Inspection Performed  RUE Inspected  LUE Inspection Performed  Nails and Digits Inspection Performed                 Diagram Legend     Erythematous scaling macule/papule c/w actinic keratosis       Vascular papule c/w angioma      Pigmented verrucoid papule/plaque c/w seborrheic keratosis      Yellow umbilicated papule c/w sebaceous hyperplasia      Irregularly shaped tan macule c/w lentigo      1-2 mm smooth white papules consistent with Milia      Movable subcutaneous cyst with punctum c/w epidermal inclusion cyst      Subcutaneous movable cyst c/w pilar cyst      Firm pink to brown papule c/w dermatofibroma      Pedunculated fleshy papule(s) c/w skin tag(s)      Evenly pigmented macule c/w junctional nevus     Mildly variegated pigmented, slightly irregular-bordered macule c/w mildly atypical nevus      Flesh colored to evenly pigmented papule c/w intradermal nevus       Pink pearly papule/plaque c/w basal cell carcinoma      Erythematous hyperkeratotic cursted plaque c/w SCC      Surgical scar with no sign of skin cancer recurrence      Open and closed comedones      Inflammatory papules and pustules      Verrucoid papule consistent consistent with wart     Erythematous eczematous patches and plaques     Dystrophic onycholytic nail with subungual debris c/w onychomycosis     Umbilicated papule    Erythematous-base heme-crusted tan verrucoid plaque consistent with inflamed seborrheic keratosis     Erythematous Silvery Scaling Plaque c/w Psoriasis     See annotation      Assessment / Plan:      Pathology Orders:       Normal Orders This Visit    Specimen to Pathology, Dermatology     Questions:    Procedure Type: Dermatology and skin neoplasms    Number of Specimens: 1    ------------------------: -------------------------    Spec 1 Procedure: Biopsy    Spec 1 Clinical Impression: isk    Spec 1 Source: right frontal scalp    Release to patient: Immediate        Neoplasm of uncertain behavior of skin  -     Specimen to Pathology, Dermatology  - Discussed diagnosis with patient and explained uncertain nature of condition, including differential DDX.   - Discussed treatment options (biopsy, close monitoring) with patient, including the risks and benefits of each. Patient opted to pursue biopsy.  - Shave Biopsy Procedure Note: Discussed procedure with patient/patient's guardian including risks and benefits as  well as treatment alternatives. Risks of procedure include pain, bleeding, infection, post-inflammatory pigmentary alteration, scar, recurrence. Patient informed that the purpose of a biopsy is sampling of condition in question rather than removal in entirety; further treatment may be necessary. Verbal consent obtained. Area to be biopsied marked and cleansed with alcohol. Local anesthesia achieved by injecting approximately 1 cc of 1% lidocaine with epinephrine. One shave biopsy performed using a double edge razor blade; specimen submitted to pathology. Hemostasis achieved with aluminum chloride. Petroleum jelly and bandage applied to wound. Patient tolerated procedure well. After-visit wound care instructions reviewed and provided in writing.     AK (actinic keratosis)  - Discussed diagnosis, etiology, and precancerous nature of condition.   - Cryosurgery Procedure Note: Discussed procedure with patient/patient's guardian including risks and benefits as well as treatment alternatives. Risks of procedure include pain, itching, swelling, redness, blistering, crusting, wound formation, post-inflammatory pigmentary alteration, scar, recurrence. Verbal consent obtained. LN2 cryosurgery performed to 6 lesion(s). Patient tolerated procedure well. After-visit wound care instructions reviewed and provided in writing.     ISK  - Discussed diagnosis, etiology, and treatment options.   - Cryosurgery Procedure Note: Discussed procedure with patient/patient's guardian including risks and benefits as well as treatment alternatives. Risks of procedure include pain, itching, swelling, redness, blistering, crusting, wound formation, post-inflammatory pigmentary alteration, scar, recurrence. Verbal consent obtained. LN2 cryosurgery performed to 2 lesion(s). Patient tolerated procedure well. After-visit wound care instructions reviewed and provided in writing.      Lentigines  - Benign; reassured treatment not necessary.   -  Recommended daily sun protection, including the use of OTC broad-spectrum sunscreen (SPF 30 or greater) and sun-protective clothing.       SK (seborrheic keratosis)  Cherry angioma  - Benign; reassured treatment not necessary.      History of malignant melanoma  History of nonmelanoma skin cancer  Screening for skin cancer  - Upper body skin examination performed today.  - Findings listed above.   - Sites of prior malignancy and regional LN examined - no concern for recurrence today.    - Recommended routine self examination of skin.    - Recommended daily sun protection, including the use of OTC broad-spectrum sunscreen (SPF 30 or greater) and sun-protective clothing.             Follow up in about 6 months (around 8/7/2024) for skin check, sooner PRN, sooner pending pathology.

## 2024-02-07 NOTE — PATIENT INSTRUCTIONS
Shave Biopsy Wound Care    Your doctor has performed a shave biopsy today.  A band aid and vaseline ointment has been placed over the site.  This should remain in place for NO LONGER THAN 48 hours.  It is fine to remove the bandaid after 24 hours, if the area is no longer bleeding. It is recommended that you keep the area dry (do not wet)) for the first 24 hours.  After 24 hours, wash the area with warm soap and water and apply Vaseline jelly.  Many patients prefer to use Neosporin or Bacitracin ointment.  This is acceptable; however, know that you can develop an allergy to this medication even if you have used it safely for years.  It is important to keep the area moist.  Letting it dry out and get air slows healing time, and will worsen the scar.        If you notice increasing redness, tenderness, pain, or yellow drainage at the biopsy site, please notify your doctor.  These are signs of an infection.    If your biopsy site is bleeding, apply firm pressure for 15 minutes straight.  Repeat for another 15 minutes, if it is still bleeding.   If the surgical site continues to bleed, then please contact your doctor.      For [facility] users:   You will receive your biopsy results in [facility] as soon as they are available. Please be assured that your physician/provider will review your results and will then determine what further treatment, evaluation, or planning is required. You should be contacted by your physician's/provider's office within 5 business days of receiving your results; If not, please reach out to directly. This is one more way [facility] is putting you first.     [facility] [place] [address]/ [phone] FAX/ [email or web address]     CRYOSURGERY      Your doctor has used a method called cryosurgery to treat your skin condition. Cryosurgery refers to the use of very cold substances to treat a variety of skin conditions such as warts, pre-skin cancers, molluscum contagiosum,  sun spots, and several benign growths. The substance we use in cryosurgery is liquid nitrogen and is so cold (-195 degrees Celsius) that is burns when administered.     Following treatment in the office, the skin may immediately burn and become red. You may find the area around the lesion is affected as well. It is sometimes necessary to treat not only the lesion, but a small area of the surrounding normal skin to achieve a good response.     A blister, and even a blood filled blister, may form after treatment.   This is a normal response. If the blister is painful, it is acceptable to sterilize a needle and with rubbing alcohol and gently pop the blister. It is important that you gently wash the area with soap and warm water as the blister fluid may contain wart virus if a wart was treated. Do no remove the roof of the blister.     The area treated can take anywhere from 1-3 weeks to heal. Healing time depends on the kind skin lesion treated, the location, and how aggressively the lesion was treated. It is recommended that the areas treated are covered with Vaseline or bacitracin ointment and a band-aid. If a band-aid is not practical, just ointment applied several times per day will do. Keeping these areas moist will speed the healing time.    Treatment with liquid nitrogen can leave a scar. In dark skin, it may be a light or dark scar, in light skin it may be a white or pink scar. These will generally fade with time, but may never go away completely.     If you have any concerns after your treatment, please feel free to call the office.       [facility] [place] [address]/ [phone] FAX/ [email or web address] What Are the Symptoms of Skin Cancer?  A change in your skin is the most common sign of skin cancer. This could be a new growth, a sore that doesnt heal, or a change in a mole. Not all skin cancers look the same.    For melanoma specifically, a simple way to remember the warning  signs is to remember the A-B-C-D-Es of melanoma--    A stands for asymmetrical. Does the mole or spot have an irregular shape with two parts that look very different?  B stands for border. Is the border irregular or jagged?  C is for color. Is the color uneven?  D is for diameter. Is the mole or spot larger than the size of a pea?  E is for evolving. Has the mole or spot changed during the past few weeks or months?    Talk to your doctor if you notice changes in your skin such as a new growth, a sore that doesnt heal, a change in an old growth, or any of the A-B-C-D-Es of melanoma    What Can I Do to Reduce My Risk of Skin Cancer?  Protection from ultraviolet (UV) radiation is important all year, not just during the summer or at the beach. UV rays from the sun can reach you on cloudy and hazy days, not just on bright and reynaldo days. UV rays also reflect off of surfaces like water, cement, sand, and snow. Indoor tanning (using a tanning bed, hollingsworth, or sunlamp to get tan) exposes users to UV radiation.    The hours between 10 a.m. and 4 p.m. Daylight Saving Time (9 a.m. to 3 p.m. standard time) are the most hazardous for UV exposure outdoors in the continental [place]. UV rays from sunlight are the greatest during the late spring and early summer in [place].    CDC recommends easy options for protection from UV radiation--    Stay in the shade or indoors, especially during midday hours.  Wear clothing that covers your arms and legs.  Wear a hat with a wide brim to shade your face, head, ears, and neck.  Wear sunglasses that wrap around and block both UVA and UVB rays.  Use sunscreen with a sun protection factor (SPF) of 30 or higher, and both UVA and UVB (broad spectrum) protection.  Avoid indoor tanning.    Adapted from https://www.cdc.gov/cancer/skin/basic_info/

## 2024-02-12 LAB
FINAL PATHOLOGIC DIAGNOSIS: NORMAL
GROSS: NORMAL
Lab: NORMAL
MICROSCOPIC EXAM: NORMAL

## 2024-02-14 NOTE — PROGRESS NOTES
Skin, right frontal scalp, shave biopsy:   -VERRUCOUS KERATOSIS, IRRITATED AND INFLAMED     Please call to discuss results / plan / schedule:   Benign, no further treatment necessary. Please call to inform and schedule recommended 6 month f/u visit. Thank you

## 2024-03-04 DIAGNOSIS — I10 PRIMARY HYPERTENSION: ICD-10-CM

## 2024-03-04 RX ORDER — HYDROCHLOROTHIAZIDE 12.5 MG/1
12.5 TABLET ORAL
Qty: 90 TABLET | Refills: 3 | OUTPATIENT
Start: 2024-03-04

## 2024-03-04 NOTE — TELEPHONE ENCOUNTER
No care due was identified.  [facility] Embedded Care Due Messages. Reference number: [id number].   3/04/2024 10:40:53 AM CST

## 2024-03-05 NOTE — TELEPHONE ENCOUNTER
Refill Decision Note   Bhakti Phipps  is requesting a refill authorization.  Brief Assessment and Rationale for Refill:  Quick Discontinue     Medication Therapy Plan:  prescription was discontinued on 2/28/2024 by Patrizia Mcneil, PharmD      Comments:     Note composed:7:49 PM 03/04/2024

## 2024-03-25 ENCOUNTER — LAB VISIT (OUTPATIENT)
Dept: LAB | Facility: HOSPITAL | Age: 89
End: 2024-03-25
Attending: INTERNAL MEDICINE
Payer: MEDICARE

## 2024-03-25 DIAGNOSIS — D64.89 ANEMIA DUE TO OTHER CAUSE, NOT CLASSIFIED: ICD-10-CM

## 2024-03-25 DIAGNOSIS — M15.9 PRIMARY OSTEOARTHRITIS INVOLVING MULTIPLE JOINTS: ICD-10-CM

## 2024-03-25 DIAGNOSIS — I10 ESSENTIAL HYPERTENSION: ICD-10-CM

## 2024-03-25 LAB
ALBUMIN SERPL BCP-MCNC: 4.1 G/DL (ref 3.5–5.2)
ANION GAP SERPL CALC-SCNC: 9 MMOL/L (ref 8–16)
ANION GAP SERPL CALC-SCNC: 9 MMOL/L (ref 8–16)
BASOPHILS # BLD AUTO: 0.11 K/UL (ref 0–0.2)
BASOPHILS NFR BLD: 1.1 % (ref 0–1.9)
BUN SERPL-MCNC: 19 MG/DL (ref 8–23)
BUN SERPL-MCNC: 19 MG/DL (ref 8–23)
CALCIUM SERPL-MCNC: 9.5 MG/DL (ref 8.7–10.5)
CALCIUM SERPL-MCNC: 9.5 MG/DL (ref 8.7–10.5)
CHLORIDE SERPL-SCNC: 108 MMOL/L (ref 95–110)
CHLORIDE SERPL-SCNC: 108 MMOL/L (ref 95–110)
CO2 SERPL-SCNC: 25 MMOL/L (ref 23–29)
CO2 SERPL-SCNC: 25 MMOL/L (ref 23–29)
CREAT SERPL-MCNC: 0.9 MG/DL (ref 0.5–1.4)
CREAT SERPL-MCNC: 0.9 MG/DL (ref 0.5–1.4)
DIFFERENTIAL METHOD BLD: ABNORMAL
EOSINOPHIL # BLD AUTO: 0.3 K/UL (ref 0–0.5)
EOSINOPHIL NFR BLD: 3.2 % (ref 0–8)
ERYTHROCYTE [DISTWIDTH] IN BLOOD BY AUTOMATED COUNT: 14.7 % (ref 11.5–14.5)
EST. GFR  (NO RACE VARIABLE): >60 ML/MIN/1.73 M^2
EST. GFR  (NO RACE VARIABLE): >60 ML/MIN/1.73 M^2
GLUCOSE SERPL-MCNC: 113 MG/DL (ref 70–110)
GLUCOSE SERPL-MCNC: 113 MG/DL (ref 70–110)
HCT VFR BLD AUTO: 35.9 % (ref 37–48.5)
HGB BLD-MCNC: 11.4 G/DL (ref 12–16)
IMM GRANULOCYTES # BLD AUTO: 0.04 K/UL (ref 0–0.04)
IMM GRANULOCYTES NFR BLD AUTO: 0.4 % (ref 0–0.5)
LYMPHOCYTES # BLD AUTO: 2.3 K/UL (ref 1–4.8)
LYMPHOCYTES NFR BLD: 23.4 % (ref 18–48)
MCH RBC QN AUTO: 30.9 PG (ref 27–31)
MCHC RBC AUTO-ENTMCNC: 31.8 G/DL (ref 32–36)
MCV RBC AUTO: 97 FL (ref 82–98)
MONOCYTES # BLD AUTO: 0.7 K/UL (ref 0.3–1)
MONOCYTES NFR BLD: 7.3 % (ref 4–15)
NEUTROPHILS # BLD AUTO: 6.4 K/UL (ref 1.8–7.7)
NEUTROPHILS NFR BLD: 64.6 % (ref 38–73)
NRBC BLD-RTO: 0 /100 WBC
PHOSPHATE SERPL-MCNC: 4.1 MG/DL (ref 2.7–4.5)
PLATELET # BLD AUTO: 439 K/UL (ref 150–450)
PMV BLD AUTO: 11 FL (ref 9.2–12.9)
POTASSIUM SERPL-SCNC: 4.6 MMOL/L (ref 3.5–5.1)
POTASSIUM SERPL-SCNC: 4.6 MMOL/L (ref 3.5–5.1)
RBC # BLD AUTO: 3.69 M/UL (ref 4–5.4)
SODIUM SERPL-SCNC: 142 MMOL/L (ref 136–145)
SODIUM SERPL-SCNC: 142 MMOL/L (ref 136–145)
WBC # BLD AUTO: 9.86 K/UL (ref 3.9–12.7)

## 2024-03-25 PROCEDURE — 80069 RENAL FUNCTION PANEL: CPT | Mod: HCNC | Performed by: INTERNAL MEDICINE

## 2024-03-25 PROCEDURE — 36415 COLL VENOUS BLD VENIPUNCTURE: CPT | Mod: HCNC,PO | Performed by: INTERNAL MEDICINE

## 2024-03-25 PROCEDURE — 85025 COMPLETE CBC W/AUTO DIFF WBC: CPT | Mod: HCNC | Performed by: INTERNAL MEDICINE

## 2024-04-11 ENCOUNTER — OFFICE VISIT (OUTPATIENT)
Dept: RHEUMATOLOGY | Facility: CLINIC | Age: 89
End: 2024-04-11
Payer: MEDICARE

## 2024-04-11 VITALS
HEART RATE: 83 BPM | DIASTOLIC BLOOD PRESSURE: 62 MMHG | HEIGHT: 61 IN | WEIGHT: 137.88 LBS | SYSTOLIC BLOOD PRESSURE: 131 MMHG | BODY MASS INDEX: 26.03 KG/M2

## 2024-04-11 DIAGNOSIS — M15.9 PRIMARY OSTEOARTHRITIS INVOLVING MULTIPLE JOINTS: Primary | ICD-10-CM

## 2024-04-11 DIAGNOSIS — R52 PAIN: ICD-10-CM

## 2024-04-11 PROCEDURE — 99999 PR PBB SHADOW E&M-EST. PATIENT-LVL IV: CPT | Mod: PBBFAC,HCNC,, | Performed by: INTERNAL MEDICINE

## 2024-04-11 PROCEDURE — 1101F PT FALLS ASSESS-DOCD LE1/YR: CPT | Mod: HCNC,CPTII,S$GLB, | Performed by: INTERNAL MEDICINE

## 2024-04-11 PROCEDURE — 99214 OFFICE O/P EST MOD 30 MIN: CPT | Mod: HCNC,S$GLB,, | Performed by: INTERNAL MEDICINE

## 2024-04-11 PROCEDURE — 1159F MED LIST DOCD IN RCRD: CPT | Mod: HCNC,CPTII,S$GLB, | Performed by: INTERNAL MEDICINE

## 2024-04-11 PROCEDURE — 3288F FALL RISK ASSESSMENT DOCD: CPT | Mod: HCNC,CPTII,S$GLB, | Performed by: INTERNAL MEDICINE

## 2024-04-11 PROCEDURE — 1126F AMNT PAIN NOTED NONE PRSNT: CPT | Mod: HCNC,CPTII,S$GLB, | Performed by: INTERNAL MEDICINE

## 2024-04-11 NOTE — PROGRESS NOTES
Subjective:          Chief Complaint: Bhakti Phipps is a 89 y.o. female who had concerns including Disease Management.    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     Patient is an 88-year-old female she was referred by Dr. Li to me first seen 8/20222 due to arthritis and concern for possible inflammatory arthritis. She also received injections within the 3rd MCP with Celestone own and bupivacaine 11/23/2021. Patient states the right 3rd MCP is first joint with severe swelling. She notes pain in various joints MCP and PIP mostly w/ left 1st IP deformed from injury with dog bite.   Serologies negative.   Inflammatory markers normal    She denies morning stiffness, notes worse with use. No pain with rest and much better with Relafen 500mg BID. No Gi upset. Patient's last renal function shows a creatinine that is within normal limits, but a slight decline in her renal fucntion.   She take ones tylenol PM at night.   Patient notes left sided LBP, s/p left TKA (2006) this has done very well. She denies PUD, GI bleed or renal disease.     Imaging studies which I cannot review but were included in Dr. Mitchell's notes show AP lateral oblique images of the right hand with significant arthritic changes particularly noticed in the right MCP joints.    Denies  symptoms of Raynaud's, fever, chills , dry mouth, dry eyes, dysphagia, tight  skin, signs of pleurisy , pericarditis, rashes/photosensitivity, oral ulcers,  chest pain, shortness of breath, GI complaints/IBD,  urinary complaints or hx of proteinura/nephritis,  alopecia , fatigue, photosensitivity, headache, change in vision, face & jaw pain, ,psoriasis, numbness, anxiety, abdomen pain, nausea, vomiting. No hx of DVT, PE, miscarriages where applicable.      Patient with macular degeneration receives injections for this.   + Sulfa allergy.     1/2024:   Patient has a delightful 89-year-old female unfortunately at our last visit November we had to discontinue  Relafen due to a declining GFR in the interim she is having more difficulty with her hands rates it a /10 Tylenol 650 that was not helping I gave her a prednisone taper as of 2023 she returns today with continued pain in the fingers.  Recall above she has macular degeneration with injections and she has a sulfa allergy making my options quite limited  REVIEW OF SYSTEMS:    Review of Systems   Constitutional:  Negative for fever.   Eyes:  Positive for redness.   Respiratory:  Negative for cough and shortness of breath.    Cardiovascular:  Negative for chest pain.   Gastrointestinal:  Negative for constipation and diarrhea.   Genitourinary:  Negative for dysuria.   Musculoskeletal:  Positive for back pain and joint pain.   Skin:  Negative for rash.   Neurological:  Negative for headaches.   Endo/Heme/Allergies:  Does not bruise/bleed easily.               Objective:            Past Medical History:   Diagnosis Date    Anxiety     Arthritis     Basal cell carcinoma of skin of other and unspecified parts of face 2012    Dx updated per  IMO Load    Essential hypertension 2012    History of melanoma     at age of 26    Hyperlipidemia 11/10/2018    Hypertension     Hypothyroidism due to acquired atrophy of thyroid 2012    Idiopathic scoliosis of thoracolumbar spine 2019    Insomnia 2014    Intermediate stage nonexudative age-related macular degeneration of both eyes 2018    Melanoma     age 26    Osteoarthritis 2012    Postmenopausal 2019    Stress incontinence 2018    Tortuous aorta 2015    Tremor      Family History   Problem Relation Age of Onset    Pancreatic cancer Mother     Leukemia Father     Lung cancer Brother     Liver cancer Sister      Social History     Tobacco Use    Smoking status: Former     Current packs/day: 0.00     Types: Cigarettes     Quit date: 1975     Years since quittin.9    Smokeless tobacco: Never   Substance Use  Topics    Alcohol use: No    Drug use: No         Current Outpatient Medications on File Prior to Visit   Medication Sig Dispense Refill    acetaminophen-codeine 300-30mg (TYLENOL #3) 300-30 mg Tab Take 1 tablet by mouth daily as needed (arthritis flare). 20 tablet 3    amLODIPine (NORVASC) 10 MG tablet Take 1 tablet (10 mg total) by mouth once daily. 30 tablet 5    b complex vitamins tablet Take 1 tablet by mouth once daily.      cholecalciferol, vitamin D3, 100 mcg (4,000 unit) Cap capsule Take by mouth once daily.      diclofenac sodium (VOLTAREN) 1 % Gel Apply 2 g topically 3 (three) times daily as needed (hand arthritis). 100 g 6    levothyroxine (SYNTHROID) 125 MCG tablet TAKE 1 TABLET EVERY DAY 90 tablet 1    magnesium oxide 500 mg Tab Take 1 tablet by mouth once daily.      olmesartan (BENICAR) 40 MG tablet Take 1 tablet (40 mg total) by mouth once daily. 30 tablet 5    propranoloL (INDERAL) 20 MG tablet TAKE 1 TABLET TWICE DAILY 180 tablet 3    venlafaxine (EFFEXOR-XR) 150 MG Cp24 TAKE 1 CAPSULE ONE TIME DAILY 90 capsule 3    VIT C/E/ZN/COPPR/LUTEIN/ZEAXAN (PRESERVISION AREDS 2 ORAL) Take by mouth.      estradioL (ESTRACE) 0.01 % (0.1 mg/gram) vaginal cream Place vaginally.      FLUAD QUAD 2023-24,65Y UP,,PF, 60 mcg (15 mcg x 4)/0.5 mL Syrg       ketoconazole (NIZORAL) 2 % shampoo Apply topically twice a week. 120 mL 1    mupirocin (BACTROBAN) 2 % ointment Apply topically 3 (three) times daily. 22 g 3    predniSONE (DELTASONE) 5 MG tablet Prednisone 15mg daily x 3 days, then 10mg daily x 3 days, then 5mg daily x 3 days 18 tablet 0     No current facility-administered medications on file prior to visit.       Vitals:    04/11/24 1114   BP: 131/62   Pulse: 83       Physical Exam:    Physical Exam  Constitutional:       Appearance: She is well-developed.   HENT:      Head: Normocephalic and atraumatic.   Eyes:      Pupils: Pupils are equal, round, and reactive to light.   Cardiovascular:      Rate and Rhythm:  Normal rate and regular rhythm.      Heart sounds: Normal heart sounds.   Pulmonary:      Effort: Pulmonary effort is normal.      Breath sounds: Normal breath sounds.   Musculoskeletal:      Right shoulder: No swelling or tenderness. Normal range of motion.      Left shoulder: No swelling or tenderness. Normal range of motion.      Right elbow: No swelling. Normal range of motion. No tenderness.      Left elbow: No swelling. Normal range of motion. No tenderness.      Right wrist: No swelling or tenderness. Decreased range of motion.      Left wrist: No swelling or tenderness. Decreased range of motion.      Right hand: Deformity and tenderness present. No swelling. Decreased range of motion.      Left hand: Deformity and tenderness present. No swelling. Decreased range of motion.      Cervical back: Normal range of motion.      Right knee: No swelling. Normal range of motion. No tenderness.      Left knee: No swelling. Normal range of motion. No tenderness.      Right foot: Normal range of motion. No swelling or tenderness.      Left foot: Normal range of motion. No swelling or tenderness.      Comments: Bony hypertrophy with some deformity of the PIP and DIP joints, no swelling or tenderness at the MCP joints.        Skin:     General: Skin is warm and dry.   Neurological:      Mental Status: She is alert and oriented to person, place, and time.   Psychiatric:         Behavior: Behavior normal.           Rapid3 Question Responses and Scores 5/1/2023   MDHAQ Score 0.8   Psychologic Score 2.2   Pain Score 4   When you awakened in the morning OVER THE LAST WEEK, did you feel stiff? No   If Yes, please indicate the number of hours until you are as limber as you will be for the day -   Fatigue Score 2   Global Health Score 5   RAPID3 Score 3.89         4/11/2024     9:14 AM   Rapid3 Question Responses and Scores   MDHAQ Score 1.4   Psychologic Score 3.3   Pain Score 2   When you awakened in the morning OVER THE LAST  WEEK, did you feel stiff? Yes   If Yes, please indicate the number of hours until you are as limber as you will be for the day 4   Fatigue Score 2   Global Health Score 2   RAPID3 Score 2.89           Assessment:       Encounter Diagnoses   Name Primary?    Primary osteoarthritis involving multiple joints Yes    Pain                   Plan:        Primary osteoarthritis involving multiple joints    Pain            Patient is an 88 y/o female with MCP, PIP and some DIP arthritis. She developed swelling of the MCP acutely and noted by hand surgeon to have significant MCP arthritis thusly referred to Rheumatology for eval of possible inflammatory arthritis.   No features of SLE, no Ps or IBD, will r/o RA     For now:  Ok for Voltaren GEL TID PRN  May use topical voltaren Gel with Tylenol 650mg up to 3 times daily.   If above not helping you can try taking Tylenol #3 with codeine as needed once daily.           No follow-ups on file.       30min consultation with greater than 50% of that time included Preparing to see the patient (review records, tests), Obtaining and/or reviewing separately obtained historical data, Performing a medically appropriate examination and/or evaluation , Ordering medications, tests, and/or procedures, Referring and communicating with other healthcare professionals , Documenting clinical information in the electronic or other health record and Independently interpreting results  (as warranted) & communicating results to the patient/family/caregiver. All questions answered.    Thank you for allowing me to participate in the care of this very pleasant patient.

## 2024-04-19 ENCOUNTER — PATIENT MESSAGE (OUTPATIENT)
Dept: FAMILY MEDICINE | Facility: CLINIC | Age: 89
End: 2024-04-19
Payer: MEDICARE

## 2024-05-22 RX ORDER — ESTRADIOL 0.1 MG/G
CREAM VAGINAL
Qty: 1 EACH | Refills: 3 | Status: SHIPPED | OUTPATIENT
Start: 2024-05-22

## 2024-05-22 NOTE — TELEPHONE ENCOUNTER
No care due was identified.  [facility] Embedded Care Due Messages. Reference number: [id number].   5/21/2024 7:01:48 PM CDT

## 2024-05-22 NOTE — TELEPHONE ENCOUNTER
Refill Routing Note   Medication(s) are not appropriate for processing by [facility] for the following reason(s):        Outside of protocol    [facility] action(s):  Route               Appointments  past 12m or future 3m with PCP    Date Provider   Last Visit   1/24/2024 Zoë Garcia MD   Next Visit   8/15/2024 Zoë Garcia MD   ED visits in past 90 days: 0        Note composed:1:42 PM 05/22/2024

## 2024-07-05 ENCOUNTER — PATIENT MESSAGE (OUTPATIENT)
Dept: FAMILY MEDICINE | Facility: CLINIC | Age: 89
End: 2024-07-05
Payer: MEDICARE

## 2024-07-09 ENCOUNTER — PATIENT MESSAGE (OUTPATIENT)
Dept: FAMILY MEDICINE | Facility: CLINIC | Age: 89
End: 2024-07-09
Payer: MEDICARE

## 2024-08-23 ENCOUNTER — LAB VISIT (OUTPATIENT)
Dept: LAB | Facility: HOSPITAL | Age: 89
End: 2024-08-23
Attending: INTERNAL MEDICINE
Payer: MEDICARE

## 2024-08-23 ENCOUNTER — OFFICE VISIT (OUTPATIENT)
Dept: FAMILY MEDICINE | Facility: CLINIC | Age: 89
End: 2024-08-23
Payer: MEDICARE

## 2024-08-23 VITALS
WEIGHT: 134.69 LBS | SYSTOLIC BLOOD PRESSURE: 132 MMHG | OXYGEN SATURATION: 96 % | BODY MASS INDEX: 25.43 KG/M2 | HEART RATE: 72 BPM | DIASTOLIC BLOOD PRESSURE: 62 MMHG | HEIGHT: 61 IN

## 2024-08-23 DIAGNOSIS — N39.46 MIXED STRESS AND URGE URINARY INCONTINENCE: ICD-10-CM

## 2024-08-23 DIAGNOSIS — E03.4 HYPOTHYROIDISM DUE TO ACQUIRED ATROPHY OF THYROID: ICD-10-CM

## 2024-08-23 DIAGNOSIS — I10 PRIMARY HYPERTENSION: ICD-10-CM

## 2024-08-23 DIAGNOSIS — I10 PRIMARY HYPERTENSION: Primary | ICD-10-CM

## 2024-08-23 DIAGNOSIS — F41.9 ANXIETY: ICD-10-CM

## 2024-08-23 DIAGNOSIS — G47.01 INSOMNIA DUE TO MEDICAL CONDITION: ICD-10-CM

## 2024-08-23 LAB
ANION GAP SERPL CALC-SCNC: 12 MMOL/L (ref 8–16)
BUN SERPL-MCNC: 17 MG/DL (ref 8–23)
CALCIUM SERPL-MCNC: 9.7 MG/DL (ref 8.7–10.5)
CHLORIDE SERPL-SCNC: 109 MMOL/L (ref 95–110)
CO2 SERPL-SCNC: 21 MMOL/L (ref 23–29)
CREAT SERPL-MCNC: 0.8 MG/DL (ref 0.5–1.4)
EST. GFR  (NO RACE VARIABLE): >60 ML/MIN/1.73 M^2
GLUCOSE SERPL-MCNC: 94 MG/DL (ref 70–110)
POTASSIUM SERPL-SCNC: 4.8 MMOL/L (ref 3.5–5.1)
SODIUM SERPL-SCNC: 142 MMOL/L (ref 136–145)
T4 FREE SERPL-MCNC: 1.27 NG/DL (ref 0.71–1.51)
TSH SERPL DL<=0.005 MIU/L-ACNC: 7.14 UIU/ML (ref 0.4–4)

## 2024-08-23 PROCEDURE — 80048 BASIC METABOLIC PNL TOTAL CA: CPT | Mod: HCNC | Performed by: INTERNAL MEDICINE

## 2024-08-23 PROCEDURE — 36415 COLL VENOUS BLD VENIPUNCTURE: CPT | Mod: HCNC,PN | Performed by: INTERNAL MEDICINE

## 2024-08-23 PROCEDURE — 84443 ASSAY THYROID STIM HORMONE: CPT | Mod: HCNC | Performed by: INTERNAL MEDICINE

## 2024-08-23 PROCEDURE — 84439 ASSAY OF FREE THYROXINE: CPT | Mod: HCNC | Performed by: INTERNAL MEDICINE

## 2024-08-23 PROCEDURE — 99999 PR PBB SHADOW E&M-EST. PATIENT-LVL IV: CPT | Mod: PBBFAC,HCNC,, | Performed by: INTERNAL MEDICINE

## 2024-08-23 RX ORDER — MIRABEGRON 25 MG/1
25 TABLET, FILM COATED, EXTENDED RELEASE ORAL DAILY
Qty: 30 TABLET | Refills: 0 | Status: SHIPPED | OUTPATIENT
Start: 2024-08-23 | End: 2025-08-23

## 2024-08-23 RX ORDER — VENLAFAXINE HYDROCHLORIDE 150 MG/1
150 CAPSULE, EXTENDED RELEASE ORAL DAILY
Qty: 90 CAPSULE | Refills: 3 | Status: SHIPPED | OUTPATIENT
Start: 2024-08-23

## 2024-08-23 NOTE — PROGRESS NOTES
Assessment and Plan:    1. Primary hypertension  Controlled, continue current regimen and continue working with digital HTN as needed.  - Basic Metabolic Panel; Future    2. Insomnia due to medical condition  Can refill #15 of temazepam when needed, last filled in January 2024.     3. Hypothyroidism due to acquired atrophy of thyroid  Continue levothyroxine.  - TSH; Future    4. Anxiety  - venlafaxine (EFFEXOR-XR) 150 MG Cp24; Take 1 capsule (150 mg total) by mouth once daily.  Dispense: 90 capsule; Refill: 3    5. Mixed stress and urge urinary incontinence  Sent trial Rx for myrbetriq to see if this is covered. Has previously failed oxybutynin, tolterodine, and estrace cream for PA purposes.   - mirabegron (MYRBETRIQ) 25 mg Tb24 ER tablet; Take 1 tablet (25 mg total) by mouth once daily.  Dispense: 30 tablet; Refill: 0      Visit today included increased complexity associated with the care of the episodic problems listed above, including addressing and managing the longitudinal care of the patient due to the serious and/or complex managed problem(s).    ______________________________________________________________________  Subjective:    Chief Complaint:  Follow up chronic medical conditions.    HPI:  Bhakti is a 89 y.o. year old female here to follow up chronic medical conditions.     HTN- Working with digital HTN. Currently prescribed nifedipine 60 daily, olmesartan 40 daily, and propranolol 20 mg BID. HCTZ stopped due to hypotension and symptoms of lightheadedness, and amlodipine stopped due to leg swelling. She reports she is feeling well on the current regimen.     Insomnia- Prescribed temazepam PRN for insomnia, had previously weaned off of this for regular use. Last filled 15 pills Jan 2024.     Anxiety- Taking venlafaxine 150 mg daily. Doing well.       Hypothyroidism- Taking levothyroxine 125 mcg daily.     Sees Rheumatology for osteoarthritis, no signs of inflammatory arthritis.     She has chronic  urinary incontinence. Had seen a urologist about 5 years ago, had failed both oxybutynin and tolterodine. Using estrace, no significant benefit with this. Wondering if there is anything else she could try. Wearing diapers essentially at this point.    Medications:  Current Outpatient Medications on File Prior to Visit   Medication Sig Dispense Refill    acetaminophen-codeine 300-30mg (TYLENOL #3) 300-30 mg Tab Take 1 tablet by mouth daily as needed (arthritis flare). 20 tablet 3    b complex vitamins tablet Take 1 tablet by mouth once daily.      cholecalciferol, vitamin D3, 100 mcg (4,000 unit) Cap capsule Take by mouth once daily.      diclofenac sodium (VOLTAREN) 1 % Gel Apply 2 g topically 3 (three) times daily as needed (hand arthritis). 100 g 6    levothyroxine (SYNTHROID) 125 MCG tablet TAKE 1 TABLET EVERY DAY 90 tablet 2    magnesium oxide 500 mg Tab Take 1 tablet by mouth once daily.      NIFEdipine (PROCARDIA-XL) 60 MG (OSM) 24 hr tablet Take 1 tablet (60 mg total) by mouth once daily. 30 tablet 5    olmesartan (BENICAR) 40 MG tablet TAKE 1 TABLET BY MOUTH ONCE DAILY 90 tablet 3    propranoloL (INDERAL) 20 MG tablet TAKE 1 TABLET TWICE DAILY 180 tablet 3    VIT C/E/ZN/COPPR/LUTEIN/ZEAXAN (PRESERVISION AREDS 2 ORAL) Take by mouth.      [DISCONTINUED] venlafaxine (EFFEXOR-XR) 150 MG Cp24 TAKE 1 CAPSULE ONE TIME DAILY 90 capsule 3    estradioL (ESTRACE) 0.01 % (0.1 mg/gram) vaginal cream PLACE 1 GRAM (1/4 APPLICATORFUL) VAGINALLY TWICE A WEEK (STRESS INCONTINENCE WITH ATROPHIC VAGINITIS) 1 each 3    FLUAD QUAD 2023-24,65Y UP,,PF, 60 mcg (15 mcg x 4)/0.5 mL Syrg       ketoconazole (NIZORAL) 2 % shampoo Apply topically twice a week. 120 mL 1    mupirocin (BACTROBAN) 2 % ointment Apply topically 3 (three) times daily. 22 g 3    predniSONE (DELTASONE) 5 MG tablet Prednisone 15mg daily x 3 days, then 10mg daily x 3 days, then 5mg daily x 3 days 18 tablet 0     No current facility-administered medications on file  "prior to visit.       Review of Systems:  Review of Systems   Constitutional:  Negative for activity change and unexpected weight change.   HENT:  Positive for hearing loss. Negative for rhinorrhea and trouble swallowing.    Eyes:  Positive for visual disturbance. Negative for discharge.   Respiratory:  Negative for chest tightness and wheezing.    Cardiovascular:  Negative for chest pain and palpitations.   Gastrointestinal:  Positive for constipation. Negative for blood in stool, diarrhea and vomiting.   Endocrine: Negative for polydipsia and polyuria.   Genitourinary:  Negative for difficulty urinating, dysuria, hematuria and menstrual problem.   Musculoskeletal:  Negative for arthralgias, joint swelling and neck pain.   Neurological:  Negative for weakness and headaches.   Psychiatric/Behavioral:  Negative for confusion and dysphoric mood.      Entered by patient and reviewed and updated during visit      Past Medical History:  Past Medical History:   Diagnosis Date    Anxiety     Arthritis     Basal cell carcinoma of skin of other and unspecified parts of face 11/21/2012    Dx updated per 2019 IMO Load    Essential hypertension 05/21/2012    History of melanoma     at age of 26    Hyperlipidemia 11/10/2018    Hypertension     Hypothyroidism due to acquired atrophy of thyroid 05/21/2012    Idiopathic scoliosis of thoracolumbar spine 02/28/2019    Insomnia 02/25/2014    Intermediate stage nonexudative age-related macular degeneration of both eyes 04/12/2018    Melanoma     age 26    Osteoarthritis 05/21/2012    Postmenopausal 08/13/2019    Stress incontinence 04/12/2018    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   Constitutional:       General: She is not in acute distress.     Appearance: She is " well-developed.   Eyes:      General:         Right eye: No discharge.         Left eye: No discharge.      Conjunctiva/sclera: Conjunctivae normal.   Cardiovascular:      Rate and Rhythm: Normal rate and regular rhythm.   Pulmonary:      Effort: Pulmonary effort is normal. No respiratory distress.   Musculoskeletal:      Right lower leg: Edema (trace) present.      Left lower leg: Edema (trace) present.   Skin:     General: Skin is warm and dry.   Neurological:      Mental Status: She is alert and oriented to person, place, and time.   Psychiatric:         Behavior: Behavior normal.         Thought Content: Thought content normal.         Judgment: Judgment normal.         Data:  Last TSH normal Matt Garcia MD  Internal Medicine

## 2024-08-26 ENCOUNTER — PATIENT MESSAGE (OUTPATIENT)
Dept: FAMILY MEDICINE | Facility: CLINIC | Age: 89
End: 2024-08-26
Payer: MEDICARE

## 2024-08-27 DIAGNOSIS — I10 ESSENTIAL HYPERTENSION: ICD-10-CM

## 2024-08-27 RX ORDER — NIFEDIPINE 60 MG/1
60 TABLET, EXTENDED RELEASE ORAL DAILY
Qty: 30 TABLET | Refills: 5 | Status: SHIPPED | OUTPATIENT
Start: 2024-08-27 | End: 2025-02-23

## 2024-08-27 NOTE — TELEPHONE ENCOUNTER
No care due was identified.  [facility] Embedded Care Due Messages. Reference number: [id number].   8/27/2024 1:36:41 PM CDT

## 2024-08-27 NOTE — TELEPHONE ENCOUNTER
Refill Routing Note   Medication(s) are not appropriate for processing by [facility] for the following reason(s):        New or recently adjusted medication    [facility] action(s):  Defer             Appointments  past 12m or future 3m with PCP    Date Provider   Last Visit   8/23/2024 Zoë Garcia MD   Next Visit   Visit date not found Zoë Garcia MD   ED visits in past 90 days: 0        Note composed:1:38 PM 08/27/2024

## 2024-09-16 DIAGNOSIS — N39.46 MIXED STRESS AND URGE URINARY INCONTINENCE: ICD-10-CM

## 2024-09-16 DIAGNOSIS — I10 ESSENTIAL HYPERTENSION: ICD-10-CM

## 2024-09-16 NOTE — TELEPHONE ENCOUNTER
No care due was identified.  [facility] Embedded Care Due Messages. Reference number: [id number].   9/16/2024 1:36:14 PM CDT

## 2024-09-16 NOTE — TELEPHONE ENCOUNTER
No care due was identified.  [facility] Embedded Care Due Messages. Reference number: [id number].   9/16/2024 10:30:38 AM CDT

## 2024-09-16 NOTE — TELEPHONE ENCOUNTER
mirabegron (MYRBETRIQ) 25 mg Tb24 ER tablet   Lov:8/23/2024  Nov:2/25/2025  Wants prescription to go to [facility]

## 2024-09-16 NOTE — TELEPHONE ENCOUNTER
No care due was identified.  [facility] Embedded Care Due Messages. Reference number: [id number].   9/16/2024 12:57:59 PM CDT

## 2024-09-17 RX ORDER — MIRABEGRON 25 MG/1
25 TABLET, FILM COATED, EXTENDED RELEASE ORAL DAILY
Qty: 90 TABLET | Refills: 3 | Status: SHIPPED | OUTPATIENT
Start: 2024-09-17 | End: 2025-09-17

## 2024-09-17 RX ORDER — NIFEDIPINE 60 MG/1
60 TABLET, EXTENDED RELEASE ORAL DAILY
Qty: 90 TABLET | Refills: 1 | Status: SHIPPED | OUTPATIENT
Start: 2024-09-17 | End: 2025-03-16

## 2024-09-17 RX ORDER — MIRABEGRON 25 MG/1
25 TABLET, FILM COATED, EXTENDED RELEASE ORAL
Qty: 30 TABLET | Refills: 0 | OUTPATIENT
Start: 2024-09-17

## 2024-09-17 NOTE — TELEPHONE ENCOUNTER
Refill Routing Note   Medication(s) are not appropriate for processing by [facility] for the following reason(s):        New or recently adjusted medication    [facility] action(s):  Defer             Appointments  past 12m or future 3m with PCP    Date Provider   Last Visit   8/23/2024 Zoë Garcia MD   Next Visit   9/16/2024 Zoë Garcia MD   ED visits in past 90 days: 0        Note composed:6:46 AM 09/17/2024

## 2024-09-17 NOTE — TELEPHONE ENCOUNTER
Refill Routing Note   Medication(s) are not appropriate for processing by [facility] for the following reason(s):        New or recently adjusted medication    [facility] action(s):  Defer               Appointments  past 12m or future 3m with PCP    Date Provider   Last Visit   8/23/2024 Zoë Garcia MD   Next Visit   2/25/2025 Zoë Garcia MD   ED visits in past 90 days: 0        Note composed:8:44 AM 09/17/2024

## 2024-09-17 NOTE — TELEPHONE ENCOUNTER
Refill Decision Note   Bhakti Phipps  is requesting a refill authorization.  Brief Assessment and Rationale for Refill:  Quick Discontinue     Medication Therapy Plan:         Comments:     Note composed:6:46 AM 09/17/2024

## 2024-10-01 ENCOUNTER — PATIENT MESSAGE (OUTPATIENT)
Dept: FAMILY MEDICINE | Facility: CLINIC | Age: 89
End: 2024-10-01
Payer: MEDICARE

## 2024-10-06 RX ORDER — PROPRANOLOL HYDROCHLORIDE 20 MG/1
TABLET ORAL
Qty: 180 TABLET | Refills: 3 | Status: SHIPPED | OUTPATIENT
Start: 2024-10-06

## 2024-10-06 NOTE — TELEPHONE ENCOUNTER
No care due was identified.  [facility] Embedded Care Due Messages. Reference number: [id number].   10/06/2024 6:20:21 AM CDT

## 2024-10-06 NOTE — TELEPHONE ENCOUNTER
Refill Decision Note   Bhakti Phipps  is requesting a refill authorization.  Brief Assessment and Rationale for Refill:  Approve     Medication Therapy Plan:         Comments:     Note composed:2:15 PM 10/06/2024

## 2024-10-12 ENCOUNTER — PATIENT MESSAGE (OUTPATIENT)
Dept: RHEUMATOLOGY | Facility: CLINIC | Age: 89
End: 2024-10-12
Payer: MEDICARE

## 2024-10-14 ENCOUNTER — OFFICE VISIT (OUTPATIENT)
Dept: RHEUMATOLOGY | Facility: CLINIC | Age: 89
End: 2024-10-14
Payer: MEDICARE

## 2024-10-14 VITALS
HEART RATE: 73 BPM | DIASTOLIC BLOOD PRESSURE: 56 MMHG | SYSTOLIC BLOOD PRESSURE: 152 MMHG | WEIGHT: 132.94 LBS | BODY MASS INDEX: 25.1 KG/M2 | HEIGHT: 61 IN

## 2024-10-14 DIAGNOSIS — M41.25 OTHER IDIOPATHIC SCOLIOSIS, THORACOLUMBAR REGION: ICD-10-CM

## 2024-10-14 DIAGNOSIS — M15.0 PRIMARY OSTEOARTHRITIS INVOLVING MULTIPLE JOINTS: Primary | ICD-10-CM

## 2024-10-14 PROCEDURE — 99999 PR PBB SHADOW E&M-EST. PATIENT-LVL III: CPT | Mod: PBBFAC,HCNC,, | Performed by: INTERNAL MEDICINE

## 2024-10-14 RX ORDER — DICLOFENAC SODIUM 10 MG/G
2 GEL TOPICAL 2 TIMES DAILY PRN
Qty: 100 G | Refills: 5 | Status: SHIPPED | OUTPATIENT
Start: 2024-10-14 | End: 2025-10-14

## 2024-10-14 ASSESSMENT — ROUTINE ASSESSMENT OF PATIENT INDEX DATA (RAPID3)
PATIENT GLOBAL ASSESSMENT SCORE: 6
PSYCHOLOGICAL DISTRESS SCORE: 2.2
FATIGUE SCORE: 1.1
PAIN SCORE: 5.5
MDHAQ FUNCTION SCORE: 1
TOTAL RAPID3 SCORE: 4.94

## 2024-10-14 NOTE — PROGRESS NOTES
Subjective:          Chief Complaint: Bhakti Phipps is a 89 y.o. female who had concerns including Disease Management.    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     Patient is an 89-year-old female she was referred by Dr. Li to me first seen 8/20222 due to arthritis and concern for possible inflammatory arthritis. She also received injections within the 3rd MCP with Celestone own and bupivacaine 11/23/2021. Patient states the right 3rd MCP is first joint with severe swelling. She notes pain in various joints MCP and PIP mostly w/ left 1st IP deformed from injury with dog bite.   Serologies negative.   Inflammatory markers normal    She denies morning stiffness, notes worse with use. No pain with rest and much better with Relafen 500mg BID. No Gi upset. Patient's last renal function shows a creatinine that is within normal limits, but a slight decline in her renal fucntion.   She take ones tylenol PM at night.   Patient notes left sided LBP, s/p left TKA (2006) this has done very well. She denies PUD, GI bleed or renal disease.     Imaging studies which I cannot review but were included in Dr. Mitchell's notes show AP lateral oblique images of the right hand with significant arthritic changes particularly noticed in the right MCP joints.    Denies  symptoms of Raynaud's, fever, chills , dry mouth, dry eyes, dysphagia, tight  skin, signs of pleurisy , pericarditis, rashes/photosensitivity, oral ulcers,  chest pain, shortness of breath, GI complaints/IBD,  urinary complaints or hx of proteinura/nephritis,  alopecia , fatigue, photosensitivity, headache, change in vision, face & jaw pain, ,psoriasis, numbness, anxiety, abdomen pain, nausea, vomiting. No hx of DVT, PE, miscarriages where applicable.      Patient with macular degeneration receives injections for this.   + Sulfa allergy.     10/2024: She has bilateral thumb pain but states voltaren gel alleviates pain in 10 minutes. Can have back pain with  ""working in garden for 1 hour" but this resolves with tylenol arthritis BID. She will sometimes use Tylenol PM  She is not using any of the tylenol #3 so we will not refill right now.   REVIEW OF SYSTEMS:    Review of Systems   Constitutional:  Negative for fever.   Eyes:  Positive for redness.   Respiratory:  Negative for cough and shortness of breath.    Cardiovascular:  Negative for chest pain.   Gastrointestinal:  Negative for constipation and diarrhea.   Genitourinary:  Negative for dysuria.   Musculoskeletal:  Positive for back pain and joint pain.   Skin:  Negative for rash.   Neurological:  Negative for headaches.   Endo/Heme/Allergies:  Does not bruise/bleed easily.               Objective:            Past Medical History:   Diagnosis Date    Anxiety     Arthritis     Basal cell carcinoma of skin of other and unspecified parts of face 2012    Dx updated per 2019 IMO Load    Essential hypertension 2012    History of melanoma     at age of 26    Hyperlipidemia 11/10/2018    Hypertension     Hypothyroidism due to acquired atrophy of thyroid 2012    Idiopathic scoliosis of thoracolumbar spine 2019    Insomnia 2014    Intermediate stage nonexudative age-related macular degeneration of both eyes 2018    Melanoma     age 26    Osteoarthritis 2012    Postmenopausal 2019    Stress incontinence 2018    Tortuous aorta 2015    Tremor      Family History   Problem Relation Name Age of Onset    Pancreatic cancer Mother      Leukemia Father      Lung cancer Brother      Liver cancer Sister       Social History     Tobacco Use    Smoking status: Former     Current packs/day: 0.00     Types: Cigarettes     Quit date: 1975     Years since quittin.4    Smokeless tobacco: Never   Substance Use Topics    Alcohol use: No    Drug use: No         Current Outpatient Medications on File Prior to Visit   Medication Sig Dispense Refill    acetaminophen-codeine " 300-30mg (TYLENOL #3) 300-30 mg Tab Take 1 tablet by mouth daily as needed (arthritis flare). 20 tablet 3    b complex vitamins tablet Take 1 tablet by mouth once daily.      cholecalciferol, vitamin D3, 100 mcg (4,000 unit) Cap capsule Take by mouth once daily.      diclofenac sodium (VOLTAREN) 1 % Gel Apply 2 g topically 3 (three) times daily as needed (hand arthritis). 100 g 6    estradioL (ESTRACE) 0.01 % (0.1 mg/gram) vaginal cream PLACE 1 GRAM (1/4 APPLICATORFUL) VAGINALLY TWICE A WEEK (STRESS INCONTINENCE WITH ATROPHIC VAGINITIS) 1 each 3    levothyroxine (SYNTHROID) 125 MCG tablet TAKE 1 TABLET EVERY DAY 90 tablet 2    magnesium oxide 500 mg Tab Take 1 tablet by mouth once daily.      mirabegron (MYRBETRIQ) 25 mg Tb24 ER tablet Take 1 tablet (25 mg total) by mouth once daily. 90 tablet 3    NIFEdipine (PROCARDIA-XL) 60 MG (OSM) 24 hr tablet Take 1 tablet (60 mg total) by mouth once daily. 90 tablet 1    olmesartan (BENICAR) 40 MG tablet TAKE 1 TABLET BY MOUTH ONCE DAILY 90 tablet 3    propranoloL (INDERAL) 20 MG tablet TAKE 1 TABLET TWICE DAILY 180 tablet 3    venlafaxine (EFFEXOR-XR) 150 MG Cp24 Take 1 capsule (150 mg total) by mouth once daily. 90 capsule 3    VIT C/E/ZN/COPPR/LUTEIN/ZEAXAN (PRESERVISION AREDS 2 ORAL) Take by mouth.      ketoconazole (NIZORAL) 2 % shampoo Apply topically twice a week. 120 mL 1     No current facility-administered medications on file prior to visit.       Vitals:    10/14/24 1004   BP: (!) 152/56   Pulse: 73       Physical Exam:    Physical Exam  Constitutional:       Appearance: She is well-developed.   HENT:      Head: Normocephalic and atraumatic.   Eyes:      Pupils: Pupils are equal, round, and reactive to light.   Cardiovascular:      Rate and Rhythm: Normal rate and regular rhythm.      Heart sounds: Normal heart sounds.   Pulmonary:      Effort: Pulmonary effort is normal.      Breath sounds: Normal breath sounds.   Musculoskeletal:      Right shoulder: No swelling  or tenderness. Normal range of motion.      Left shoulder: No swelling or tenderness. Normal range of motion.      Right elbow: No swelling. Normal range of motion. No tenderness.      Left elbow: No swelling. Normal range of motion. No tenderness.      Right wrist: No swelling or tenderness. Decreased range of motion.      Left wrist: No swelling or tenderness. Decreased range of motion.      Right hand: Deformity and tenderness present. No swelling. Decreased range of motion.      Left hand: Deformity and tenderness present. No swelling. Decreased range of motion.      Cervical back: Normal range of motion.      Right knee: No swelling. Normal range of motion. No tenderness.      Left knee: No swelling. Normal range of motion. No tenderness.      Right foot: Normal range of motion. No swelling or tenderness.      Left foot: Normal range of motion. No swelling or tenderness.      Comments: Bony hypertrophy with some deformity of the PIP and DIP joints, no swelling or tenderness at the MCP joints.        Skin:     General: Skin is warm and dry.   Neurological:      Mental Status: She is alert and oriented to person, place, and time.   Psychiatric:         Behavior: Behavior normal.             4/11/2024     9:14 AM   Rapid3 Question Responses and Scores   MDHAQ Score 1.4   Psychologic Score 3.3   Pain Score 2   When you awakened in the morning OVER THE LAST WEEK, did you feel stiff? Yes   If Yes, please indicate the number of hours until you are as limber as you will be for the day 4   Fatigue Score 2   Global Health Score 2   RAPID3 Score 2.89           Assessment:       Encounter Diagnoses   Name Primary?    Primary osteoarthritis involving multiple joints Yes    Other idiopathic scoliosis, thoracolumbar region               Plan:        Primary osteoarthritis involving multiple joints    Other idiopathic scoliosis, thoracolumbar region    Other orders  -     diclofenac sodium (VOLTAREN) 1 % Gel; Apply 2 g  topically 2 (two) times daily as needed (arthritis pain).  Dispense: 100 g; Refill: 5        Patient is an 89 y/o female with MCP, PIP and some DIP arthritis. She developed swelling of the MCP acutely and noted by hand surgeon to have significant MCP arthritis thusly referred to Rheumatology for eval of possible inflammatory arthritis.   No features of SLE, no Ps or IBD, will r/o RA     For now:  Ok for Voltaren GEL TID PRN sending to [facility] to see if they will fill for generic.   Tylenol 650mg up to 3 times daily.   Not filling tylenol #3 she is not using at this time.      Follow up in about 6 months (around 4/14/2025).       30min consultation with greater than 50% of that time included Preparing to see the patient (review records, tests), Obtaining and/or reviewing separately obtained historical data, Performing a medically appropriate examination and/or evaluation , Ordering medications, tests, and/or procedures, Referring and communicating with other healthcare professionals , Documenting clinical information in the electronic or other health record and Independently interpreting results  (as warranted) & communicating results to the patient/family/caregiver. All questions answered.    Thank you for allowing me to participate in the care of this very pleasant patient.

## 2024-10-28 ENCOUNTER — PATIENT MESSAGE (OUTPATIENT)
Dept: FAMILY MEDICINE | Facility: CLINIC | Age: 89
End: 2024-10-28
Payer: MEDICARE

## 2024-11-21 ENCOUNTER — OFFICE VISIT (OUTPATIENT)
Dept: FAMILY MEDICINE | Facility: CLINIC | Age: 89
End: 2024-11-21
Payer: MEDICARE

## 2024-11-21 ENCOUNTER — PATIENT MESSAGE (OUTPATIENT)
Dept: FAMILY MEDICINE | Facility: CLINIC | Age: 89
End: 2024-11-21

## 2024-11-21 ENCOUNTER — LAB VISIT (OUTPATIENT)
Dept: LAB | Facility: HOSPITAL | Age: 89
End: 2024-11-21
Payer: MEDICARE

## 2024-11-21 VITALS
HEART RATE: 64 BPM | HEIGHT: 61 IN | SYSTOLIC BLOOD PRESSURE: 136 MMHG | WEIGHT: 132.19 LBS | BODY MASS INDEX: 24.96 KG/M2 | OXYGEN SATURATION: 96 % | DIASTOLIC BLOOD PRESSURE: 80 MMHG

## 2024-11-21 DIAGNOSIS — E03.4 HYPOTHYROIDISM DUE TO ACQUIRED ATROPHY OF THYROID: ICD-10-CM

## 2024-11-21 DIAGNOSIS — G47.01 INSOMNIA DUE TO MEDICAL CONDITION: ICD-10-CM

## 2024-11-21 DIAGNOSIS — I10 PRIMARY HYPERTENSION: ICD-10-CM

## 2024-11-21 DIAGNOSIS — Z79.899 DRUG THERAPY: ICD-10-CM

## 2024-11-21 DIAGNOSIS — R82.90 URINE MALODOR: ICD-10-CM

## 2024-11-21 DIAGNOSIS — N39.46 MIXED STRESS AND URGE URINARY INCONTINENCE: Primary | ICD-10-CM

## 2024-11-21 DIAGNOSIS — D64.9 NORMOCHROMIC ANEMIA: Primary | ICD-10-CM

## 2024-11-21 LAB
BILIRUB UR QL STRIP: NEGATIVE
CLARITY UR REFRACT.AUTO: CLEAR
COLOR UR AUTO: YELLOW
GLUCOSE UR QL STRIP: NEGATIVE
HGB UR QL STRIP: NEGATIVE
KETONES UR QL STRIP: NEGATIVE
LEUKOCYTE ESTERASE UR QL STRIP: NEGATIVE
NITRITE UR QL STRIP: NEGATIVE
PH UR STRIP: 6 [PH] (ref 5–8)
PROT UR QL STRIP: ABNORMAL
SP GR UR STRIP: 1.03 (ref 1–1.03)
T4 FREE SERPL-MCNC: 1.27 NG/DL (ref 0.71–1.51)
URN SPEC COLLECT METH UR: ABNORMAL

## 2024-11-21 PROCEDURE — 84439 ASSAY OF FREE THYROXINE: CPT | Mod: HCNC | Performed by: NURSE PRACTITIONER

## 2024-11-21 PROCEDURE — 3288F FALL RISK ASSESSMENT DOCD: CPT | Mod: HCNC,CPTII,S$GLB, | Performed by: NURSE PRACTITIONER

## 2024-11-21 PROCEDURE — 99214 OFFICE O/P EST MOD 30 MIN: CPT | Mod: HCNC,S$GLB,, | Performed by: NURSE PRACTITIONER

## 2024-11-21 PROCEDURE — 36415 COLL VENOUS BLD VENIPUNCTURE: CPT | Mod: HCNC,PN | Performed by: NURSE PRACTITIONER

## 2024-11-21 PROCEDURE — 99999 PR PBB SHADOW E&M-EST. PATIENT-LVL IV: CPT | Mod: PBBFAC,HCNC,, | Performed by: NURSE PRACTITIONER

## 2024-11-21 PROCEDURE — 1126F AMNT PAIN NOTED NONE PRSNT: CPT | Mod: HCNC,CPTII,S$GLB, | Performed by: NURSE PRACTITIONER

## 2024-11-21 PROCEDURE — 1159F MED LIST DOCD IN RCRD: CPT | Mod: HCNC,CPTII,S$GLB, | Performed by: NURSE PRACTITIONER

## 2024-11-21 PROCEDURE — 81003 URINALYSIS AUTO W/O SCOPE: CPT | Mod: HCNC | Performed by: NURSE PRACTITIONER

## 2024-11-21 PROCEDURE — 1101F PT FALLS ASSESS-DOCD LE1/YR: CPT | Mod: HCNC,CPTII,S$GLB, | Performed by: NURSE PRACTITIONER

## 2024-11-21 PROCEDURE — 1160F RVW MEDS BY RX/DR IN RCRD: CPT | Mod: HCNC,CPTII,S$GLB, | Performed by: NURSE PRACTITIONER

## 2024-11-21 RX ORDER — TEMAZEPAM 15 MG/1
15 CAPSULE ORAL NIGHTLY PRN
Qty: 15 CAPSULE | Refills: 0 | Status: SHIPPED | OUTPATIENT
Start: 2024-11-21 | End: 2024-12-21

## 2024-11-21 RX ORDER — TEMAZEPAM 30 MG/1
30 CAPSULE ORAL NIGHTLY PRN
COMMUNITY
End: 2024-11-21

## 2024-11-21 RX ORDER — MAGNESIUM OXIDE/MAG AA CHELATE 300 MG
300 CAPSULE ORAL DAILY
COMMUNITY
End: 2024-11-21 | Stop reason: SDUPTHER

## 2024-11-21 RX ORDER — HYDROCHLOROTHIAZIDE 25 MG/1
25 TABLET ORAL DAILY
COMMUNITY
End: 2024-11-21

## 2024-11-21 RX ORDER — MIRABEGRON 50 MG/1
1 TABLET, FILM COATED, EXTENDED RELEASE ORAL DAILY
Qty: 30 TABLET | Refills: 1 | Status: SHIPPED | OUTPATIENT
Start: 2024-11-21 | End: 2025-01-20

## 2024-11-21 NOTE — PROGRESS NOTES
THIS DOCUMENT WAS MADE IN PART WITH VOICE RECOGNITION SOFTWARE.  OCCASIONALLY THIS SOFTWARE WILL MISINTERPRET WORDS OR PHRASES.     Assessment and Plan:    Mixed stress and urge urinary incontinence  Comments:  Mirabegron increased to help with symptoms.  Consider urology referral if no improvement  Orders:  -     mirabegron (MYRBETRIQ) 50 mg Tb24; Take 1 tablet (50 mg total) by mouth once daily.  Dispense: 30 tablet; Refill: 1    Insomnia due to medical condition  Comments:  Continue Restoril as needed-takes sparingly  Orders:  -     temazepam (RESTORIL) 15 mg Cap; Take 1 capsule (15 mg total) by mouth nightly as needed (insomia).  Dispense: 15 capsule; Refill: 0    Primary hypertension  Comments:  Controlled  Continue regimen  Monitor blood pressure  Stay well hydrated  Orders:  -     CBC Without Differential; Future; Expected date: 11/21/2024  -     Comprehensive Metabolic Panel; Future; Expected date: 11/21/2024    Hypothyroidism due to acquired atrophy of thyroid  Comments:  Continue levothyroxine  Will adjust if needed  Repeat TSH  Orders:  -     TSH; Future; Expected date: 11/21/2024  -     T4, Free; Future; Expected date: 11/21/2024    Urine malodor  -     Urinalysis, Reflex to Urine Culture Urine, Clean Catch; Future; Expected date: 11/21/2024             Visit summary:  Chronic conditions stable    Blood pressure controlled, patient to continue with digital medicine.    She will have labs completed today including urinalysis.    Mirabegron increased to help with urinary symptoms- recommend patient see Urology if no improvement.      Follow up in 4 months (on 3/21/2025) for Follow-up with PCP- Dr. Garcia.   ______________________________________________________________________  Subjective:    Chief Complaint:  Follow up chronic medical conditions.    HPI:  Bhakti is a 89 y.o. year old female with a past medical history noted below, here to follow up chronic medical conditions.       She reports having some  foul-smelling urine, some frequency and urgency but this is not new.  She denies dysuria.     She has chronic urinary incontinence. Had seen a urologist about 5 years ago, had failed both oxybutynin and tolterodine. Using estrace, no significant benefit with this.  Wearing diapers essentially at this point.  She reports Mirabegron helped some initially, does not seem to be as effective anymore.  She would like to increase dose if possible.      HTN- Working with digital HTN. Currently prescribed nifedipine 60 daily, olmesartan 40 daily, and propranolol 20 mg BID.  She was having some dizziness when 1st getting up in the morning with initial dose increase.  This has improved some.        Insomnia- Prescribed temazepam PRN for insomnia, had previously weaned off of this for regular use. Last filled 15 pills Jan 2024.-weaned down to 7.5mg- reports symptoms better controlled with 15 mg dose.      Anxiety- Taking venlafaxine 150 mg daily. Doing well.       Hypothyroidism- Taking levothyroxine 125 mcg daily.  TSH 7.1 elevated on previous labs- attributed increase because she wasn't taking 1st thing in the morning     Sees Rheumatology for osteoarthritis, no signs of inflammatory arthritis.        Medications:  Current Outpatient Medications on File Prior to Visit   Medication Sig Dispense Refill    acetaminophen-codeine 300-30mg (TYLENOL #3) 300-30 mg Tab Take 1 tablet by mouth daily as needed (arthritis flare). 20 tablet 3    b complex vitamins tablet Take 1 tablet by mouth once daily.      cholecalciferol, vitamin D3, 100 mcg (4,000 unit) Cap capsule Take by mouth once daily.      diclofenac sodium (VOLTAREN) 1 % Gel Apply 2 g topically 2 (two) times daily as needed (arthritis pain). 100 g 5    estradioL (ESTRACE) 0.01 % (0.1 mg/gram) vaginal cream PLACE 1 GRAM (1/4 APPLICATORFUL) VAGINALLY TWICE A WEEK (STRESS INCONTINENCE WITH ATROPHIC VAGINITIS) 1 each 3    ketoconazole (NIZORAL) 2 % shampoo Apply topically twice a  week. 120 mL 1    levothyroxine (SYNTHROID) 125 MCG tablet TAKE 1 TABLET EVERY DAY 90 tablet 2    magnesium oxide 500 mg Tab Take 1 tablet by mouth once daily.      NIFEdipine (PROCARDIA-XL) 60 MG (OSM) 24 hr tablet Take 1 tablet (60 mg total) by mouth once daily. 90 tablet 1    olmesartan (BENICAR) 40 MG tablet TAKE 1 TABLET BY MOUTH ONCE DAILY 90 tablet 3    propranoloL (INDERAL) 20 MG tablet TAKE 1 TABLET TWICE DAILY 180 tablet 3    venlafaxine (EFFEXOR-XR) 150 MG Cp24 Take 1 capsule (150 mg total) by mouth once daily. 90 capsule 3    VIT C/E/ZN/COPPR/LUTEIN/ZEAXAN (PRESERVISION AREDS 2 ORAL) Take by mouth.      [DISCONTINUED] hydroCHLOROthiazide (HYDRODIURIL) 25 MG tablet Take 25 mg by mouth once daily.      [DISCONTINUED] magnesium oxide-Mg AA chelate (MAGNESIUM, OXIDE/AA CHELATE,) 300 mg Cap Take 300 mg by mouth once daily.      [DISCONTINUED] mirabegron (MYRBETRIQ) 25 mg Tb24 ER tablet Take 1 tablet (25 mg total) by mouth once daily. 90 tablet 3    [DISCONTINUED] temazepam (RESTORIL) 30 mg capsule Take 30 mg by mouth nightly as needed for Insomnia.       No current facility-administered medications on file prior to visit.       Review of Systems:  Review of Systems   Constitutional:  Negative for activity change, fatigue, fever and unexpected weight change.   HENT:  Positive for hearing loss. Negative for rhinorrhea and trouble swallowing.    Eyes:  Negative for discharge and visual disturbance.   Respiratory:  Negative for chest tightness and wheezing.    Cardiovascular:  Negative for chest pain and palpitations.   Gastrointestinal:  Negative for blood in stool, constipation, diarrhea and vomiting.   Endocrine: Negative for polydipsia and polyuria.   Genitourinary:  Positive for frequency and urgency. Negative for difficulty urinating, dysuria, hematuria and menstrual problem.   Musculoskeletal:  Positive for arthralgias. Negative for joint swelling and neck pain.   Neurological:  Positive for dizziness.  "Negative for weakness and headaches.   Psychiatric/Behavioral:  Negative for confusion and dysphoric mood.        Past Medical History:  Past Medical History:   Diagnosis Date    Anxiety     Arthritis     Basal cell carcinoma of skin of other and unspecified parts of face 11/21/2012    Dx updated per 2019 IMO Load    Essential hypertension 05/21/2012    History of melanoma     at age of 26    Hyperlipidemia 11/10/2018    Hypertension     Hypothyroidism due to acquired atrophy of thyroid 05/21/2012    Idiopathic scoliosis of thoracolumbar spine 02/28/2019    Insomnia 02/25/2014    Intermediate stage nonexudative age-related macular degeneration of both eyes 04/12/2018    Melanoma     age 26    Osteoarthritis 05/21/2012    Postmenopausal 08/13/2019    Stress incontinence 04/12/2018    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   Constitutional:       General: She is not in acute distress.  HENT:      Head: Normocephalic and atraumatic.      Right Ear: Tympanic membrane, ear canal and external ear normal.      Left Ear: Tympanic membrane, ear canal and external ear normal.      Nose: Nose normal.      Mouth/Throat:      Mouth: Mucous membranes are moist.      Pharynx: Oropharynx is clear.   Eyes:      General: No scleral icterus.     Conjunctiva/sclera: Conjunctivae normal.   Cardiovascular:      Rate and Rhythm: Normal rate and regular rhythm.   Pulmonary:      Effort: Pulmonary effort is normal. No respiratory distress.      Breath sounds: Normal breath sounds.   Musculoskeletal:      Cervical back: Neck supple.      Right lower leg: No edema.      Left lower leg: No edema.   Skin:     General: Skin is warm and dry.   Neurological:      Mental Status: She is alert and oriented to person, place, and " time.   Psychiatric:         Mood and Affect: Mood normal.         Behavior: Behavior normal.         Thought Content: Thought content normal.         Data:  Lab Results   Component Value Date    TSH 7.136 (H) 08/23/2024    CMP  Sodium   Date Value Ref Range Status   08/23/2024 142 136 - 145 mmol/L Final     Potassium   Date Value Ref Range Status   08/23/2024 4.8 3.5 - 5.1 mmol/L Final     Chloride   Date Value Ref Range Status   08/23/2024 109 95 - 110 mmol/L Final     CO2   Date Value Ref Range Status   08/23/2024 21 (L) 23 - 29 mmol/L Final     Glucose   Date Value Ref Range Status   08/23/2024 94 70 - 110 mg/dL Final     BUN   Date Value Ref Range Status   08/23/2024 17 8 - 23 mg/dL Final     Creatinine   Date Value Ref Range Status   08/23/2024 0.8 0.5 - 1.4 mg/dL Final     Calcium   Date Value Ref Range Status   08/23/2024 9.7 8.7 - 10.5 mg/dL Final     Total Protein   Date Value Ref Range Status   01/17/2024 7.3 6.0 - 8.4 g/dL Final     Albumin   Date Value Ref Range Status   03/25/2024 4.1 3.5 - 5.2 g/dL Final     Total Bilirubin   Date Value Ref Range Status   01/17/2024 0.4 0.1 - 1.0 mg/dL Final     Comment:     For infants and newborns, interpretation of results should be based  on gestational age, weight and in agreement with clinical  observations.    Premature Infant recommended reference ranges:  Up to 24 hours.............<8.0 mg/dL  Up to 48 hours............<12.0 mg/dL  3-5 days..................<15.0 mg/dL  6-29 days.................<15.0 mg/dL       Alkaline Phosphatase   Date Value Ref Range Status   01/17/2024 71 55 - 135 U/L Final     AST   Date Value Ref Range Status   01/17/2024 16 10 - 40 U/L Final     ALT   Date Value Ref Range Status   01/17/2024 14 10 - 44 U/L Final     Anion Gap   Date Value Ref Range Status   08/23/2024 12 8 - 16 mmol/L Final     eGFR   Date Value Ref Range Status   08/23/2024 >60.0 >60 mL/min/1.73 m^2 Final         Medical history reviewed, Medications reconciled.               Dea Junior, FNP-C  Family Medicine

## 2024-11-25 ENCOUNTER — LAB VISIT (OUTPATIENT)
Dept: LAB | Facility: HOSPITAL | Age: 89
End: 2024-11-25
Attending: FAMILY MEDICINE
Payer: MEDICARE

## 2024-11-25 DIAGNOSIS — Z79.899 DRUG THERAPY: ICD-10-CM

## 2024-11-25 DIAGNOSIS — D64.9 NORMOCHROMIC ANEMIA: ICD-10-CM

## 2024-11-25 LAB
FERRITIN SERPL-MCNC: 92 NG/ML (ref 20–300)
IRON SERPL-MCNC: 73 UG/DL (ref 30–160)
SATURATED IRON: 20 % (ref 20–50)
TOTAL IRON BINDING CAPACITY: 363 UG/DL (ref 250–450)
TRANSFERRIN SERPL-MCNC: 245 MG/DL (ref 200–375)
VIT B12 SERPL-MCNC: 698 PG/ML (ref 210–950)

## 2024-11-25 PROCEDURE — 82728 ASSAY OF FERRITIN: CPT | Mod: HCNC | Performed by: FAMILY MEDICINE

## 2024-11-25 PROCEDURE — 83921 ORGANIC ACID SINGLE QUANT: CPT | Mod: HCNC | Performed by: FAMILY MEDICINE

## 2024-11-25 PROCEDURE — 84466 ASSAY OF TRANSFERRIN: CPT | Mod: HCNC | Performed by: FAMILY MEDICINE

## 2024-11-25 PROCEDURE — 36415 COLL VENOUS BLD VENIPUNCTURE: CPT | Mod: HCNC,PN | Performed by: FAMILY MEDICINE

## 2024-11-25 PROCEDURE — 82607 VITAMIN B-12: CPT | Mod: HCNC | Performed by: FAMILY MEDICINE

## 2024-11-29 LAB — METHYLMALONATE SERPL-SCNC: 0.21 UMOL/L

## 2024-12-09 ENCOUNTER — TELEPHONE (OUTPATIENT)
Dept: FAMILY MEDICINE | Facility: CLINIC | Age: 89
End: 2024-12-09
Payer: MEDICARE

## 2024-12-09 ENCOUNTER — OFFICE VISIT (OUTPATIENT)
Dept: URGENT CARE | Facility: CLINIC | Age: 89
End: 2024-12-09
Payer: MEDICARE

## 2024-12-09 VITALS
WEIGHT: 132 LBS | HEART RATE: 73 BPM | RESPIRATION RATE: 18 BRPM | SYSTOLIC BLOOD PRESSURE: 148 MMHG | BODY MASS INDEX: 24.92 KG/M2 | HEIGHT: 61 IN | DIASTOLIC BLOOD PRESSURE: 69 MMHG | OXYGEN SATURATION: 96 % | TEMPERATURE: 99 F

## 2024-12-09 DIAGNOSIS — R21 RASH AND OTHER NONSPECIFIC SKIN ERUPTION: Primary | ICD-10-CM

## 2024-12-09 PROCEDURE — 99213 OFFICE O/P EST LOW 20 MIN: CPT | Mod: S$GLB,,, | Performed by: EMERGENCY MEDICINE

## 2024-12-09 RX ORDER — PERMETHRIN 50 MG/G
CREAM TOPICAL ONCE
Qty: 60 G | Refills: 0 | Status: SHIPPED | OUTPATIENT
Start: 2024-12-09 | End: 2024-12-09

## 2024-12-09 RX ORDER — HYDROXYZINE HYDROCHLORIDE 25 MG/1
25 TABLET, FILM COATED ORAL 3 TIMES DAILY PRN
Qty: 15 TABLET | Refills: 0 | Status: SHIPPED | OUTPATIENT
Start: 2024-12-09

## 2024-12-09 RX ORDER — TRIAMCINOLONE ACETONIDE 1 MG/G
OINTMENT TOPICAL 2 TIMES DAILY
Qty: 454 G | Refills: 0 | Status: SHIPPED | OUTPATIENT
Start: 2024-12-09 | End: 2024-12-23

## 2024-12-09 NOTE — TELEPHONE ENCOUNTER
----- Message from Iva sent at 12/9/2024  9:25 AM CST -----  Contact: Kellie (relative)  Type:  Same Day Appointment Request    Caller is requesting a same day appointment.  Caller declined first available appointment listed below.    Name of Caller: Kellie    When is the first available appointment? Tomorrow with office    Symptoms: bad itching all over, little bumps scattered all over, no fever    Best Call Back Number: [phone]    Additional Information: please call to advise    Thanks

## 2024-12-09 NOTE — PATIENT INSTRUCTIONS
Change detergent back to Gain or other unscented option.     Wash bedding.     Start triamcinolone ointment tomorrow after completing scabies treatment tonight.     Trim nails.     Avoid scratching - press or gently rub skin or apply ice to relieve itching.     You must understand that you've received an Urgent Care treatment only and that you may be released before all your medical problems are known or treated. You, the patient, will arrange for follow up care as instructed.    Follow up with your PCP or specialty clinic as directed if not improved or as needed. You can call [phone] to schedule an appointment with the appropriate provider.      You, the patient, will arrange for follow up care as instructed.     If your condition worsens or fails to improve we recommend that you receive another evaluation at the ER immediately or contact your PCP to discuss your concerns.     Patient aware of treatment plan and verbalized understanding.

## 2024-12-09 NOTE — PROGRESS NOTES
"Subjective:      Patient ID: Bhakti Phipps is a 89 y.o. female.    Vitals:  height is 5' 1" (1.549 m) and weight is 59.9 kg (132 lb). Her oral temperature is 98.5 °F (36.9 °C). Her blood pressure is 148/69 (abnormal) and her pulse is 73. Her respiration is 18 and oxygen saturation is 96%.     Chief Complaint: Rash    Pt presents with c/o rash from chest to toe X 1 week. Pt states has taken benadryl x2 days for itching. Has red bumps to arms/trunk/back/legs/some on feet.   Pt statse just had adjustment made to urinary medication (Myrbetriq) but contacted prescribed MD and they did not believe it was related.     She did recently switch her detergent from Gain to Tide just prior to onset of rash. No other change in products. Her niece is with her and concerned for scabies due to her visiting a nursing home frequently. No recent hotel stays or new/old furniture purchases.    Rash  This is a new problem. The current episode started in the past 7 days. The problem is unchanged. The rash is diffuse. The rash is characterized by redness and itchiness. She was exposed to nothing. Pertinent negatives include no anorexia, congestion, cough, diarrhea, eye pain, facial edema, fatigue, fever, joint pain, nail changes, rhinorrhea, shortness of breath, sore throat or vomiting. Treatments tried: benadryl. The treatment provided no relief. There is no history of allergies, asthma, eczema or varicella.     Constitution: Negative for fatigue and fever.   HENT:  Negative for congestion and sore throat.    Eyes:  Negative for eye pain.   Respiratory:  Negative for cough and shortness of breath.    Gastrointestinal:  Negative for vomiting and diarrhea.   Skin:  Positive for rash. Negative for hives.   Allergic/Immunologic: Negative for hives.      Objective:     Physical Exam   Constitutional: She is oriented to person, place, and time. She appears well-developed. She is cooperative.  Non-toxic appearance. She does not appear ill. No " distress.   HENT:   Head: Normocephalic and atraumatic.   Ears:   Right Ear: Hearing normal.   Left Ear: Hearing and external ear normal.   Nose: Nose normal. No mucosal edema, rhinorrhea or nasal deformity. No epistaxis. Right sinus exhibits no maxillary sinus tenderness and no frontal sinus tenderness. Left sinus exhibits no maxillary sinus tenderness and no frontal sinus tenderness.   Mouth/Throat: Uvula is midline, oropharynx is clear and moist and mucous membranes are normal. Mucous membranes are moist. No trismus in the jaw. Normal dentition. No uvula swelling. No oropharyngeal exudate, posterior oropharyngeal edema or posterior oropharyngeal erythema.   Eyes: Conjunctivae and lids are normal. No scleral icterus.   Neck: Trachea normal and phonation normal. Neck supple. No edema present. No erythema present. No neck rigidity present.   Cardiovascular: Normal rate, regular rhythm, normal heart sounds and normal pulses.   Pulmonary/Chest: Effort normal and breath sounds normal. No respiratory distress. She has no decreased breath sounds. She has no rhonchi.   Abdominal: Normal appearance.   Musculoskeletal: Normal range of motion.         General: No deformity. Normal range of motion.   Neurological: She is alert and oriented to person, place, and time. She exhibits normal muscle tone. Coordination normal.   Skin: Skin is warm, dry, intact, not diaphoretic, not pale and rash (excoriated rash with erythematous papules to bilateral arms/legs/trunk/back/some to dorsal feet; no webspace involvement; no involvement of head/face).   Psychiatric: Her speech is normal and behavior is normal. Judgment and thought content normal.   Nursing note and vitals reviewed.      Assessment:     1. Rash and other nonspecific skin eruption        Plan:     Excoriated diffuse rash. No webspace involvement. Low likelihood of scabies but niece concerned and easy to treat. More likely dermatitis from detergent change - will switch back  or use unscented.    Start topical triamcinolone ointment tomorrow.     Will try hydroxyzine instead of Benadryl for itching - advised patient of risk of sleepiness on medication and importance of preventing falls.     Derm referral placed to her Dermatologist if not improving.    Rash and other nonspecific skin eruption  -     permethrin (ELIMITE) 5 % cream; Apply topically once. Apply one-half tube from top of neck to soles of feet at night. Wash off after 8-14 hours. Repeat in 1 week if needed. for 1 dose  Dispense: 60 g; Refill: 0  -     hydrOXYzine HCL (ATARAX) 25 MG tablet; Take 1 tablet (25 mg total) by mouth 3 (three) times daily as needed for Itching (may cause drowsiness).  Dispense: 15 tablet; Refill: 0  -     triamcinolone acetonide 0.1% (KENALOG) 0.1 % ointment; Apply topically 2 (two) times daily. for 14 days  Dispense: 454 g; Refill: 0  -     Ambulatory referral/consult to Dermatology        Patient Instructions   Change detergent back to Gain or other unscented option.     Wash bedding.     Start triamcinolone ointment tomorrow after completing scabies treatment tonight.     Trim nails.     Avoid scratching - press or gently rub skin or apply ice to relieve itching.     You must understand that you've received an Urgent Care treatment only and that you may be released before all your medical problems are known or treated. You, the patient, will arrange for follow up care as instructed.    Follow up with your PCP or specialty clinic as directed if not improved or as needed. You can call [phone] to schedule an appointment with the appropriate provider.      You, the patient, will arrange for follow up care as instructed.     If your condition worsens or fails to improve we recommend that you receive another evaluation at the ER immediately or contact your PCP to discuss your concerns.     Patient aware of treatment plan and verbalized understanding.

## 2024-12-13 ENCOUNTER — PATIENT MESSAGE (OUTPATIENT)
Facility: CLINIC | Age: 89
End: 2024-12-13
Payer: MEDICARE

## 2024-12-13 DIAGNOSIS — R21 RASH AND OTHER NONSPECIFIC SKIN ERUPTION: ICD-10-CM

## 2024-12-13 RX ORDER — HYDROXYZINE HYDROCHLORIDE 25 MG/1
25 TABLET, FILM COATED ORAL 3 TIMES DAILY PRN
Qty: 15 TABLET | Refills: 0 | Status: CANCELLED | OUTPATIENT
Start: 2024-12-13

## 2024-12-14 ENCOUNTER — PATIENT MESSAGE (OUTPATIENT)
Dept: FAMILY MEDICINE | Facility: CLINIC | Age: 89
End: 2024-12-14
Payer: MEDICARE

## 2024-12-14 DIAGNOSIS — N39.46 MIXED STRESS AND URGE URINARY INCONTINENCE: ICD-10-CM

## 2024-12-14 DIAGNOSIS — R21 RASH AND OTHER NONSPECIFIC SKIN ERUPTION: ICD-10-CM

## 2024-12-16 ENCOUNTER — OFFICE VISIT (OUTPATIENT)
Facility: CLINIC | Age: 89
End: 2024-12-16
Payer: MEDICARE

## 2024-12-16 ENCOUNTER — TELEPHONE (OUTPATIENT)
Dept: FAMILY MEDICINE | Facility: CLINIC | Age: 89
End: 2024-12-16
Payer: MEDICARE

## 2024-12-16 DIAGNOSIS — G47.01 INSOMNIA DUE TO MEDICAL CONDITION: ICD-10-CM

## 2024-12-16 DIAGNOSIS — R21 RASH AND NONSPECIFIC SKIN ERUPTION: Primary | ICD-10-CM

## 2024-12-16 PROCEDURE — 1159F MED LIST DOCD IN RCRD: CPT | Mod: HCNC,CPTII,S$GLB, | Performed by: DERMATOLOGY

## 2024-12-16 PROCEDURE — 3288F FALL RISK ASSESSMENT DOCD: CPT | Mod: HCNC,CPTII,S$GLB, | Performed by: DERMATOLOGY

## 2024-12-16 PROCEDURE — 99214 OFFICE O/P EST MOD 30 MIN: CPT | Mod: 25,HCNC,S$GLB, | Performed by: DERMATOLOGY

## 2024-12-16 PROCEDURE — 11104 PUNCH BX SKIN SINGLE LESION: CPT | Mod: HCNC,S$GLB,, | Performed by: DERMATOLOGY

## 2024-12-16 PROCEDURE — 88305 TISSUE EXAM BY PATHOLOGIST: CPT | Mod: 26,HCNC,, | Performed by: DERMATOLOGY

## 2024-12-16 PROCEDURE — 88305 TISSUE EXAM BY PATHOLOGIST: CPT | Mod: HCNC,PO | Performed by: DERMATOLOGY

## 2024-12-16 PROCEDURE — 99999 PR PBB SHADOW E&M-EST. PATIENT-LVL III: CPT | Mod: PBBFAC,HCNC,, | Performed by: DERMATOLOGY

## 2024-12-16 PROCEDURE — 1101F PT FALLS ASSESS-DOCD LE1/YR: CPT | Mod: HCNC,CPTII,S$GLB, | Performed by: DERMATOLOGY

## 2024-12-16 RX ORDER — TRIAMCINOLONE ACETONIDE 1 MG/G
CREAM TOPICAL 2 TIMES DAILY PRN
Qty: 454 G | Refills: 1 | Status: SHIPPED | OUTPATIENT
Start: 2024-12-16

## 2024-12-16 RX ORDER — HYDROXYZINE HYDROCHLORIDE 25 MG/1
25 TABLET, FILM COATED ORAL 3 TIMES DAILY PRN
Qty: 15 TABLET | Refills: 0 | OUTPATIENT
Start: 2024-12-16

## 2024-12-16 RX ORDER — PERMETHRIN 50 MG/G
CREAM TOPICAL
Qty: 60 G | Refills: 1 | Status: SHIPPED | OUTPATIENT
Start: 2024-12-16

## 2024-12-16 RX ORDER — HYDROXYZINE HYDROCHLORIDE 10 MG/1
10 TABLET, FILM COATED ORAL NIGHTLY PRN
Qty: 60 TABLET | Refills: 1 | Status: SHIPPED | OUTPATIENT
Start: 2024-12-16

## 2024-12-16 NOTE — TELEPHONE ENCOUNTER
----- Message from Abbey sent at 12/16/2024  8:16 AM CST -----  Contact: Pt Shania  Type:  RX Refill Request    Who Called: James Hopkins  Refill or New Rx: Refill   RX Name and Strength: hydrOXYzine HCL (ATARAX) 25 MG tablet  How is the patient currently taking it? (ex. 1XDay): as directed   Is this a 30 day or 90 day RX:  Preferred Pharmacy with phone number:   [facility] - [place], [place] - [address]  [address] [address]  Phone: [phone] Fax: [phone]  Local or Mail Order: Local   Ordering Provider: Urgent Care (pt was only given a few per pt Niece, pt is still itching)  Would the patient rather a call back or a response via [facility]?  Call  Best Call Back Number: James Hopkins [phone]  Additional Information:  Additionally, if pt can't get a refill pt is requesting an appt to be examine... Please call to advise... Thank you...

## 2024-12-16 NOTE — TELEPHONE ENCOUNTER
Refill Routing Note   Medication(s) are not appropriate for processing by [facility] for the following reason(s):        No active prescription written by provider  Required vitals abnormal    [facility] action(s):  Defer               Appointments  past 12m or future 3m with PCP    Date Provider   Last Visit   8/23/2024 Zoë Garcia MD   Next Visit   12/16/2024 Zoë Garcia MD   ED visits in past 90 days: 0        Note composed:10:04 AM 12/16/2024

## 2024-12-16 NOTE — PATIENT INSTRUCTIONS
No more than 1 shower or bath a day.   Out of shower or bath in less than 10 minutes.   Use only warm water, NOT hot.   Soap only where needed - areas that make body odor or are visibly dirty.  Use gentle soaps only (eg, Cetaphil Body Wash; non soap cleansers are more gentle than bar soaps).   After shower or bath, pat dry - do not scrub or rub aggressively.   Apply thick moisturizing cream twice daily, once being after the shower or bath (eg, Cerave, Cetaphil, Vanicream).   Avoid common allergens/irritants - fragrances, botanicals, etc.

## 2024-12-16 NOTE — PROGRESS NOTES
Subjective:      Patient ID:  Bhakti Phipps is a 89 y.o. female who presents for   Chief Complaint   Patient presents with    Itching     rash     HPI    Established patient.  Here for new issue - extremely itchy rash all over, ongoing x 2 weeks. Prior treatment incl permethrin, TAC and hydroxyzine per UC. Permethrin treatment only x1. Friend works at NH, some concern of scabies expressed at . Lives with adult grandson, and his gf, her daughter - no one else with problems in house hold.   Otherwise in normal state of health. No fevers, chills, malaise.   No involvement of head, neck, palms, soles, genitals.     PMH: HTN, HLD, hypothyroidism, stress incontinence, OA, anxiety  Meds: mirabegron (new in the past few months), temazepam, diclofenac, propranolol, levothyroxine, nifedipine, venlafaxine, olmesartan, levothyroxine, estradiol cream, vitamins (Bi complex, D3, preservision, magnesium), tylenol prn    +MM  L neck with unknown characteristics, s/p WLE, bilateral lymph node dissection ~60 years ago    +NMSC  SCC at R cheek s/p Mohs 9/2023 ([facility])  BCC at L preauricular s/p excision in 2012    Review of Systems    Objective:   Physical Exam   Constitutional: She appears well-developed and well-nourished. She is cooperative.   HENT:   Head: Normocephalic and atraumatic.   Eyes: Lids are normal. Lids are normal.  Right conjunctiva is not injected. Left conjunctiva is not injected. No conjunctival no injection.   Pulmonary/Chest: Effort normal. No respiratory distress.   Musculoskeletal: Lymphadenopathy:      Cervical: No cervical adenopathy.      Upper Body:   No axillary adenopathy present.No supraclavicular adenopathy is present.     Lymphadenopathy: No supraclavicular adenopathy is present.     She has no cervical adenopathy.     She has no axillary adenopathy.   Neurological: She is alert and oriented to person, place, and time.   Psychiatric: She has a normal mood and affect. Her speech is normal and  behavior is normal. Mood, memory, affect and thought content normal.   Skin:   Areas Examined (abnormalities noted in diagram):   Scalp / Hair Palpated and Inspected  Head / Face Inspection Performed  Neck Inspection Performed  Chest / Axilla Inspection Performed  Abdomen Inspection Performed  Back Inspection Performed  RUE Inspected  LUE Inspection Performed  Nails and Digits Inspection Performed            Diagram Legend     Erythematous scaling macule/papule c/w actinic keratosis       Vascular papule c/w angioma      Pigmented verrucoid papule/plaque c/w seborrheic keratosis      Yellow umbilicated papule c/w sebaceous hyperplasia      Irregularly shaped tan macule c/w lentigo     1-2 mm smooth white papules consistent with Milia      Movable subcutaneous cyst with punctum c/w epidermal inclusion cyst      Subcutaneous movable cyst c/w pilar cyst      Firm pink to brown papule c/w dermatofibroma      Pedunculated fleshy papule(s) c/w skin tag(s)      Evenly pigmented macule c/w junctional nevus     Mildly variegated pigmented, slightly irregular-bordered macule c/w mildly atypical nevus      Flesh colored to evenly pigmented papule c/w intradermal nevus       Pink pearly papule/plaque c/w basal cell carcinoma      Erythematous hyperkeratotic cursted plaque c/w SCC      Surgical scar with no sign of skin cancer recurrence      Open and closed comedones      Inflammatory papules and pustules      Verrucoid papule consistent consistent with wart     Erythematous eczematous patches and plaques     Dystrophic onycholytic nail with subungual debris c/w onychomycosis     Umbilicated papule    Erythematous-base heme-crusted tan verrucoid plaque consistent with inflamed seborrheic keratosis     Erythematous Silvery Scaling Plaque c/w Psoriasis     See annotation              Assessment / Plan:      Pathology Orders:       Normal Orders This Visit    Specimen to Pathology, Dermatology     Comments:    Number of  Specimens:->1  ------------------------->-------------------------  Spec 1 Procedure:->Biopsy  Spec 1 Clinical Impression:->new onset itchy rash ;  urticarial dermatitis - eczema vs drug vs prodromal bp vs  scabies  Spec 1 Source:->left upper arm  Release to patient->Immediate    Questions:    Procedure Type: Dermatology and skin neoplasms    Number of Specimens: 1    ------------------------: -------------------------    Spec 1 Procedure: Biopsy    Spec 1 Clinical Impression: new onset itchy rash ; urticarial dermatitis - eczema vs drug vs prodromal bp vs scabies    Spec 1 Source: left upper arm    Release to patient: Immediate          Rash and nonspecific skin eruption  -     Specimen to Pathology, Dermatology  -     triamcinolone acetonide 0.1% (KENALOG) 0.1 % cream; Apply topically 2 (two) times daily as needed (rash on body). Avoid use on face, body folds, groin/genitalia.  Dispense: 454 g; Refill: 1  -     permethrin (ELIMITE) 5 % cream; Thoroughly massage cream from neck to soles of feet; leave on for 8 to 14 hours before removing (shower or bath); for infants and the elderly, also apply on the hairline, neck, scalp, temple, and forehead; repeat in 1 week.  Dispense: 60 g; Refill: 1  -     hydrOXYzine HCL (ATARAX) 10 MG Tab; Take 1 tablet (10 mg total) by mouth nightly as needed (itching).  Dispense: 60 tablet; Refill: 1       - Discussed DDX and work up/treatment options.    - Mineral oil of lesion at wrist negative.  - Punch Biopsy Procedure Note: Discussed procedure with patient/patient's guardian including risks and benefits as well as treatment alternatives. Risks of procedure include pain, bleeding, infection, post-inflammatory pigmentary alteration, scar, recurrence. Patient informed that the purpose of a biopsy is sampling of condition in question rather than removal in entirety; further treatment may be necessary. Verbal consent obtained. Area to be biopsied marked and cleansed with alcohol. Local  anesthesia achieved by injecting approximately 1 cc of 1% lidocaine with epinephrine. One punch biopsy performed using a 4 mm disposable punch; specimen submitted to pathology. Hemostasis and closure achieved with 4 Prolene sutures. Petroleum jelly and bandage applied to wound. Patient tolerated procedure well. After-visit wound care instructions reviewed and provided in writing. F/u 14 days for S/R.    - OK to empirically perform repeat permethrin course. Rx refill sent.   - Otherwise, gentle dry skin care.   - TAC cream BID PRN rash, itching.   - Refill low dose hydroxyzine as per patient request to help with sleeping. Cautioned patient given age but has been tolerating.   - Counseled on potential SE of medication(s) and instructed on use.   - Stop all non-essential medications. Pt to also stop rx mirabegron (newest addition and also not working).            Follow up in about 2 weeks (around 12/30/2024).

## 2024-12-16 NOTE — TELEPHONE ENCOUNTER
Spoke w/ nieceKellie. Pt will keep Derm appt today. Asked that she bring all current medications for Derm to review. She states that is not possible today.

## 2024-12-17 RX ORDER — MIRABEGRON 50 MG/1
1 TABLET, FILM COATED, EXTENDED RELEASE ORAL DAILY
Qty: 90 TABLET | Refills: 2 | Status: SHIPPED | OUTPATIENT
Start: 2024-12-17

## 2024-12-17 RX ORDER — TEMAZEPAM 15 MG/1
CAPSULE ORAL
Qty: 15 CAPSULE | Refills: 0 | Status: SHIPPED | OUTPATIENT
Start: 2024-12-17

## 2024-12-20 LAB
FINAL PATHOLOGIC DIAGNOSIS: NORMAL
GROSS: NORMAL
Lab: NORMAL
MICROSCOPIC EXAM: NORMAL

## 2024-12-21 NOTE — PROGRESS NOTES
"Please call to discuss results / plan / schedule:   - Please call patient / patient's daughter to review message sent via portal:  "Your biopsy results are suggestive of a medication reaction. Therefore, I again recommend stopping ALL unnecessary medication and supplements/vitamins. I also recommend stopping the mirabegron which was added most recently and is most suspicious. We will discuss further at your follow up appointment."    1. Skin, left upper arm, punch biopsy:    - SPONGIOTIC AND INTERFACE DERMATITIS WITH EOSINOPHILS (see comment)   Comment:  In the appropriate clinical context these histologic findings are consistent with a drug eruption.  The presence of interface activity argues against eczema, bullous pemphigoid, and scabies infestation.   No cancer cells noted on histologic examination. Your provider will correlate this pathology report with your clinical findings.   MICRO EXAM:  1. Sections show compact hyperkeratosis with focal parakeratosis and neutrophils overlying epidermis with mild acanthosis, spongiosis, exocytosis of lymphocytes, and scattered necrotic keratinocytes at various levels, including along the dermal-epidermal    junction.  Within the superficial dermis is a perivascular to lichenoid lymphocyte predominant inflammatory infiltrate with occasional admixed eosinophils and rare neutrophils.     "

## 2024-12-23 ENCOUNTER — TELEPHONE (OUTPATIENT)
Dept: HEMATOLOGY/ONCOLOGY | Facility: CLINIC | Age: 89
End: 2024-12-23
Payer: MEDICARE

## 2024-12-23 NOTE — TELEPHONE ENCOUNTER
"Spoke to pt's niece, Kellie. Went over path results per KW's request. Pt's niece states that they are discontinuing all unnecessary meds, and she called the pharmacy to inquire about mirabegron and they stated that it couldn't have caused reaction. Pt's niece stated that pt was untruthful about all of the medications she had been taking. She stated that she found OTC sleep aids that the pt had been taking and denied taking until today. Pt's niece stated that she believes pt is still taking OTC sleep aids even though pt says she discarded the medication. Pt's niece was curious as to whether it was possible to get a new rx for a medication to stop the itching as the pt is stating that the current medication is not relieving the itch.    ----- Message from Codie Hardy MD sent at 12/21/2024  6:05 AM CST -----  Please call to discuss results / plan / schedule:   - Please call patient / patient's daughter to review message sent via portal:  "Your biopsy results are suggestive of a medication reaction. Therefore, I again recommend stopping ALL unnecessary medication and supplements/vitamins. I also recommend stopping the mirabegron which was added most recently and is most suspicious. We will discuss further at your follow up appointment."    1. Skin, left upper arm, punch biopsy:    - SPONGIOTIC AND INTERFACE DERMATITIS WITH EOSINOPHILS (see comment)   Comment:  In the appropriate clinical context these histologic findings are consistent with a drug eruption.  The presence of interface activity argues against eczema, bullous pemphigoid, and scabies infestation.   No cancer cells noted on histologic examination. Your provider will correlate this pathology report with your clinical findings.   MICRO EXAM:  1. Sections show compact hyperkeratosis with focal parakeratosis and neutrophils overlying epidermis with mild acanthosis, spongiosis, exocytosis of lymphocytes, and scattered necrotic keratinocytes at various " levels, including along the dermal-epidermal    junction.  Within the superficial dermis is a perivascular to lichenoid lymphocyte predominant inflammatory infiltrate with occasional admixed eosinophils and rare neutrophils.

## 2025-01-06 ENCOUNTER — OFFICE VISIT (OUTPATIENT)
Facility: CLINIC | Age: OVER 89
End: 2025-01-06
Payer: MEDICARE

## 2025-01-06 DIAGNOSIS — Z48.02 ENCOUNTER FOR REMOVAL OF SUTURES: ICD-10-CM

## 2025-01-06 DIAGNOSIS — L27.0 DRUG RASH: Primary | ICD-10-CM

## 2025-01-06 PROCEDURE — 99212 OFFICE O/P EST SF 10 MIN: CPT | Mod: HCNC,S$GLB,, | Performed by: DERMATOLOGY

## 2025-01-06 PROCEDURE — 1101F PT FALLS ASSESS-DOCD LE1/YR: CPT | Mod: HCNC,CPTII,S$GLB, | Performed by: DERMATOLOGY

## 2025-01-06 PROCEDURE — G2211 COMPLEX E/M VISIT ADD ON: HCPCS | Mod: HCNC,S$GLB,, | Performed by: DERMATOLOGY

## 2025-01-06 PROCEDURE — 1159F MED LIST DOCD IN RCRD: CPT | Mod: HCNC,CPTII,S$GLB, | Performed by: DERMATOLOGY

## 2025-01-06 PROCEDURE — 3288F FALL RISK ASSESSMENT DOCD: CPT | Mod: HCNC,CPTII,S$GLB, | Performed by: DERMATOLOGY

## 2025-01-06 PROCEDURE — 99999 PR PBB SHADOW E&M-EST. PATIENT-LVL II: CPT | Mod: PBBFAC,HCNC,, | Performed by: DERMATOLOGY

## 2025-01-06 NOTE — PROGRESS NOTES
"  Subjective:      Patient ID:  Bhakti Phipps is a 89 y.o. female who presents for   Chief Complaint   Patient presents with    Suture / Staple Removal    Results     Path results       HPI    Established patient.  Here for 2 week S/R, path review of new rash. See hx and path report below. Patient reports stopping all unnecessary supplements and meds, also stopped most recent rx medication mirabegron as recommended. Also rx TAC cream, repeated permethrin treatment, refilled low dose hydroxyzine. Rash and itching have resolved.     12/2024  1. Skin, left upper arm, punch biopsy:    - SPONGIOTIC AND INTERFACE DERMATITIS WITH EOSINOPHILS (see comment)   Comment:  In the appropriate clinical context these histologic findings are consistent with a drug eruption.  The presence of interface activity argues against eczema, bullous pemphigoid, and scabies infestation.   No cancer cells noted on histologic examination. Your provider will correlate this pathology report with your clinical findings.   MICRO EXAM:  1. Sections show compact hyperkeratosis with focal parakeratosis and neutrophils overlying epidermis with mild acanthosis, spongiosis, exocytosis of lymphocytes, and scattered necrotic keratinocytes at various levels, including along the dermal-epidermal    junction.  Within the superficial dermis is a perivascular to lichenoid lymphocyte predominant inflammatory infiltrate with occasional admixed eosinophils and rare neutrophils.     Rash hx 12/2024  "Here for new issue - extremely itchy rash all over, ongoing x 2 weeks. Prior treatment incl permethrin, TAC and hydroxyzine per . Permethrin treatment only x1. Friend works at NH, some concern of scabies expressed at . Lives with adult grandson, and his gf, her daughter - no one else with problems in house hold.   Otherwise in normal state of health. No fevers, chills, malaise.   No involvement of head, neck, palms, soles, genitals.     PMH: HTN, HLD, " "hypothyroidism, stress incontinence, OA, anxiety  Meds: mirabegron (new in the past few months), temazepam, diclofenac, propranolol, levothyroxine, nifedipine, venlafaxine, olmesartan, levothyroxine, estradiol cream, vitamins (Bi complex, D3, preservision, magnesium), tylenol prn"    +MM  L neck with unknown characteristics, s/p WLE, bilateral lymph node dissection ~60 years ago    +NMSC  SCC at R cheek s/p Mohs 9/2023 ([facility])  BCC at L preauricular s/p excision in 2012    Review of Systems    Objective:   Physical Exam   Constitutional: She appears well-developed and well-nourished. She is cooperative.   HENT:   Head: Normocephalic and atraumatic.   Eyes: Lids are normal. Lids are normal.  Right conjunctiva is not injected. Left conjunctiva is not injected. No conjunctival no injection.   Pulmonary/Chest: Effort normal. No respiratory distress.   Neurological: She is alert and oriented to person, place, and time.   Psychiatric: She has a normal mood and affect. Her speech is normal and behavior is normal. Mood, memory, affect and thought content normal.   Skin:   Areas Examined (abnormalities noted in diagram):   Scalp / Hair Palpated and Inspected  Head / Face Inspection Performed  Neck Inspection Performed  Chest / Axilla Inspection Performed  Abdomen Inspection Performed  Back Inspection Performed  RUE Inspected  LUE Inspection Performed  Nails and Digits Inspection Performed            Diagram Legend     Erythematous scaling macule/papule c/w actinic keratosis       Vascular papule c/w angioma      Pigmented verrucoid papule/plaque c/w seborrheic keratosis      Yellow umbilicated papule c/w sebaceous hyperplasia      Irregularly shaped tan macule c/w lentigo     1-2 mm smooth white papules consistent with Milia      Movable subcutaneous cyst with punctum c/w epidermal inclusion cyst      Subcutaneous movable cyst c/w pilar cyst      Firm pink to brown papule c/w dermatofibroma      Pedunculated fleshy papule(s) " c/w skin tag(s)      Evenly pigmented macule c/w junctional nevus     Mildly variegated pigmented, slightly irregular-bordered macule c/w mildly atypical nevus      Flesh colored to evenly pigmented papule c/w intradermal nevus       Pink pearly papule/plaque c/w basal cell carcinoma      Erythematous hyperkeratotic cursted plaque c/w SCC      Surgical scar with no sign of skin cancer recurrence      Open and closed comedones      Inflammatory papules and pustules      Verrucoid papule consistent consistent with wart     Erythematous eczematous patches and plaques     Dystrophic onycholytic nail with subungual debris c/w onychomycosis     Umbilicated papule    Erythematous-base heme-crusted tan verrucoid plaque consistent with inflamed seborrheic keratosis     Erythematous Silvery Scaling Plaque c/w Psoriasis     See annotation    Assessment / Plan:          Drug rash    Encounter for removal of sutures    Clinicopath correlation most c/w drug eruption. Rash resolved s/p cessation most recent rx mirabergron, unnecessary supplements/vitamins/otc meds. Would only re-attempt mirabergron with caution (eg test dose with close monitoring for recurrence) if no other alteratives, as other otc trigger a consideration.         Follow up for skin check.  Sooner prn

## 2025-01-08 RX ORDER — LEVOTHYROXINE SODIUM 125 UG/1
125 TABLET ORAL DAILY
Qty: 90 TABLET | Refills: 2 | Status: SHIPPED | OUTPATIENT
Start: 2025-01-08

## 2025-01-08 NOTE — TELEPHONE ENCOUNTER
Refill Routing Note   Medication(s) are not appropriate for processing by [facility] for the following reason(s):        Required labs abnormal    [facility] action(s):  Defer             Appointments  past 12m or future 3m with PCP    Date Provider   Last Visit   8/23/2024 Zoë Garcia MD   Next Visit   2/25/2025 Zoë Garcia MD   ED visits in past 90 days: 0        Note composed:1:10 PM 01/08/2025

## 2025-01-08 NOTE — TELEPHONE ENCOUNTER
No care due was identified.  [facility] Embedded Care Due Messages. Reference number: [id number].   1/08/2025 2:46:08 AM CST

## 2025-01-30 DIAGNOSIS — Z00.00 ENCOUNTER FOR MEDICARE ANNUAL WELLNESS EXAM: ICD-10-CM

## 2025-02-12 DIAGNOSIS — I10 ESSENTIAL HYPERTENSION: ICD-10-CM

## 2025-02-12 RX ORDER — NIFEDIPINE 60 MG/1
60 TABLET, EXTENDED RELEASE ORAL
Qty: 90 TABLET | Refills: 1 | Status: SHIPPED | OUTPATIENT
Start: 2025-02-12

## 2025-02-12 NOTE — TELEPHONE ENCOUNTER
Please approve for  NIFEdipine ER Osmotic Release Oral Tablet Extended Release 24 Hour 60 MG     Last OV 11/21/24  Next appt 02/25/25

## 2025-02-12 NOTE — TELEPHONE ENCOUNTER
Refill Routing Note   Medication(s) are not appropriate for processing by [facility] for the following reason(s):        Required vitals abnormal    [facility] action(s):  Defer               Appointments  past 12m or future 3m with PCP    Date Provider   Last Visit   8/23/2024 Zoë Garcia MD   Next Visit   2/25/2025 Zoë Garcia MD   ED visits in past 90 days: 0        Note composed:10:19 AM 02/12/2025

## 2025-02-25 ENCOUNTER — OFFICE VISIT (OUTPATIENT)
Dept: FAMILY MEDICINE | Facility: CLINIC | Age: OVER 89
End: 2025-02-25
Payer: MEDICARE

## 2025-02-25 ENCOUNTER — LAB VISIT (OUTPATIENT)
Dept: LAB | Facility: HOSPITAL | Age: OVER 89
End: 2025-02-25
Attending: INTERNAL MEDICINE
Payer: MEDICARE

## 2025-02-25 VITALS
SYSTOLIC BLOOD PRESSURE: 124 MMHG | BODY MASS INDEX: 25.18 KG/M2 | RESPIRATION RATE: 16 BRPM | HEART RATE: 58 BPM | TEMPERATURE: 98 F | WEIGHT: 133.38 LBS | DIASTOLIC BLOOD PRESSURE: 58 MMHG | HEIGHT: 61 IN | OXYGEN SATURATION: 96 %

## 2025-02-25 DIAGNOSIS — I10 ESSENTIAL HYPERTENSION: ICD-10-CM

## 2025-02-25 DIAGNOSIS — E03.4 HYPOTHYROIDISM DUE TO ACQUIRED ATROPHY OF THYROID: ICD-10-CM

## 2025-02-25 DIAGNOSIS — N39.46 MIXED STRESS AND URGE URINARY INCONTINENCE: Primary | ICD-10-CM

## 2025-02-25 DIAGNOSIS — G47.01 INSOMNIA DUE TO MEDICAL CONDITION: ICD-10-CM

## 2025-02-25 LAB
ANION GAP SERPL CALC-SCNC: 11 MMOL/L (ref 8–16)
BUN SERPL-MCNC: 23 MG/DL (ref 8–23)
CALCIUM SERPL-MCNC: 9.6 MG/DL (ref 8.7–10.5)
CHLORIDE SERPL-SCNC: 104 MMOL/L (ref 95–110)
CHOLEST SERPL-MCNC: 177 MG/DL (ref 120–199)
CHOLEST/HDLC SERPL: 4.1 {RATIO} (ref 2–5)
CO2 SERPL-SCNC: 26 MMOL/L (ref 23–29)
CREAT SERPL-MCNC: 0.9 MG/DL (ref 0.5–1.4)
EST. GFR  (NO RACE VARIABLE): >60 ML/MIN/1.73 M^2
GLUCOSE SERPL-MCNC: 88 MG/DL (ref 70–110)
HDLC SERPL-MCNC: 43 MG/DL (ref 40–75)
HDLC SERPL: 24.3 % (ref 20–50)
LDLC SERPL CALC-MCNC: 98.4 MG/DL (ref 63–159)
NONHDLC SERPL-MCNC: 134 MG/DL
POTASSIUM SERPL-SCNC: 4.9 MMOL/L (ref 3.5–5.1)
SODIUM SERPL-SCNC: 141 MMOL/L (ref 136–145)
T4 FREE SERPL-MCNC: 1.37 NG/DL (ref 0.71–1.51)
TRIGL SERPL-MCNC: 178 MG/DL (ref 30–150)
TSH SERPL DL<=0.005 MIU/L-ACNC: 5.66 UIU/ML (ref 0.4–4)

## 2025-02-25 PROCEDURE — 84439 ASSAY OF FREE THYROXINE: CPT | Mod: HCNC | Performed by: INTERNAL MEDICINE

## 2025-02-25 PROCEDURE — 1126F AMNT PAIN NOTED NONE PRSNT: CPT | Mod: HCNC,CPTII,S$GLB, | Performed by: INTERNAL MEDICINE

## 2025-02-25 PROCEDURE — 80061 LIPID PANEL: CPT | Mod: HCNC | Performed by: INTERNAL MEDICINE

## 2025-02-25 PROCEDURE — 80048 BASIC METABOLIC PNL TOTAL CA: CPT | Mod: HCNC | Performed by: INTERNAL MEDICINE

## 2025-02-25 PROCEDURE — G2211 COMPLEX E/M VISIT ADD ON: HCPCS | Mod: HCNC,S$GLB,, | Performed by: INTERNAL MEDICINE

## 2025-02-25 PROCEDURE — 99214 OFFICE O/P EST MOD 30 MIN: CPT | Mod: HCNC,S$GLB,, | Performed by: INTERNAL MEDICINE

## 2025-02-25 PROCEDURE — 99999 PR PBB SHADOW E&M-EST. PATIENT-LVL III: CPT | Mod: PBBFAC,HCNC,, | Performed by: INTERNAL MEDICINE

## 2025-02-25 PROCEDURE — 1159F MED LIST DOCD IN RCRD: CPT | Mod: HCNC,CPTII,S$GLB, | Performed by: INTERNAL MEDICINE

## 2025-02-25 PROCEDURE — 36415 COLL VENOUS BLD VENIPUNCTURE: CPT | Mod: HCNC,PN | Performed by: INTERNAL MEDICINE

## 2025-02-25 PROCEDURE — 3288F FALL RISK ASSESSMENT DOCD: CPT | Mod: HCNC,CPTII,S$GLB, | Performed by: INTERNAL MEDICINE

## 2025-02-25 PROCEDURE — 84443 ASSAY THYROID STIM HORMONE: CPT | Mod: HCNC | Performed by: INTERNAL MEDICINE

## 2025-02-25 PROCEDURE — 1160F RVW MEDS BY RX/DR IN RCRD: CPT | Mod: HCNC,CPTII,S$GLB, | Performed by: INTERNAL MEDICINE

## 2025-02-25 PROCEDURE — 1101F PT FALLS ASSESS-DOCD LE1/YR: CPT | Mod: HCNC,CPTII,S$GLB, | Performed by: INTERNAL MEDICINE

## 2025-02-25 RX ORDER — TEMAZEPAM 15 MG/1
15 CAPSULE ORAL NIGHTLY PRN
Qty: 15 CAPSULE | Refills: 0 | Status: SHIPPED | OUTPATIENT
Start: 2025-02-25

## 2025-02-25 RX ORDER — MIRTAZAPINE 7.5 MG/1
7.5 TABLET, FILM COATED ORAL NIGHTLY PRN
Qty: 30 TABLET | Refills: 1 | Status: SHIPPED | OUTPATIENT
Start: 2025-02-25 | End: 2026-02-25

## 2025-02-25 NOTE — PROGRESS NOTES
Assessment and Plan:    1. Mixed stress and urge urinary incontinence (Primary)  Drug rash from mirabegron and not covered by insurance. Discussed trial of gemtesa, which letter from insurance states is first time. Discussed that if rash worsens again with this, would discontinue.  - vibegron 75 mg Tab; Take 1 tablet (75 mg total) by mouth once daily.  Dispense: 30 tablet; Refill: 0    2. Insomnia due to medical condition  Trial of mirtazapine to reduce how often she feels like she is needing temazepam. Do not use this together, which was discussed.  - mirtazapine (REMERON) 7.5 MG Tab; Take 1 tablet (7.5 mg total) by mouth nightly as needed (insomnia).  Dispense: 30 tablet; Refill: 1  - temazepam (RESTORIL) 15 mg Cap; Take 1 capsule (15 mg total) by mouth nightly as needed (insomnia, rare use).  Dispense: 15 capsule; Refill: 0    3. Essential hypertension  BP high at home, but normal here on repeated checks. Will schedule nurse visit to check her machine and technique.  - Basic Metabolic Panel; Future  - Lipid Panel; Future    4. Hypothyroidism due to acquired atrophy of thyroid  - TSH; Future      Visit today included increased complexity associated with the care of the episodic problems listed above, including addressing and managing the longitudinal care of the patient due to the serious and/or complex managed problem(s).    ______________________________________________________________________  Subjective:    Chief Complaint:  Follow up chronic medical conditions.    HPI:  Bhakti is a 89 y.o. year old female here to follow up chronic medical conditions.     She is now living with her adult grandson.     Was previously taking mirabegron for mixed stress and urge incontinence. Previously had failed oxybutynin and tolterodine. Had developed drug rash after starting this that resolved with stopping mirabegron so she is not taking this any longer.    HTN- Working with digital HTN. Currently prescribed nifedipine 60  "daily, olmesartan 40 daily, and propranolol 20 mg BID. Systolic BP has been consistently slightly high, but had held off on increasing doses due to constant orthostatic lightheadedness and recurrent falls.    Insomnia-     Anxiety- Taking venlafaxine 150 mg daily. Doing well.       Hypothyroidism- Taking levothyroxine 125 mcg daily. TSH still very slightly high with this, but have left dose the same with most bothersome symptom being insomnia.    Sees Rheumatology for osteoarthritis, no signs of inflammatory arthritis.      Medications:  Medications Ordered Prior to Encounter[1]    Review of Systems:  Review of Systems   Respiratory:  Negative for shortness of breath and wheezing.    Cardiovascular:  Negative for chest pain and leg swelling.   Neurological:  Negative for dizziness, syncope and light-headedness.   Psychiatric/Behavioral:  The patient is nervous/anxious.        Past Medical History:  Past Medical History:   Diagnosis Date    Anxiety     Arthritis     Basal cell carcinoma of skin of other and unspecified parts of face 11/21/2012    Dx updated per 2019 IMO Load    Essential hypertension 05/21/2012    History of melanoma     at age of 26    Hyperlipidemia 11/10/2018    Hypertension     Hypothyroidism due to acquired atrophy of thyroid 05/21/2012    Idiopathic scoliosis of thoracolumbar spine 02/28/2019    Insomnia 02/25/2014    Intermediate stage nonexudative age-related macular degeneration of both eyes 04/12/2018    Melanoma     age 26    Osteoarthritis 05/21/2012    Postmenopausal 08/13/2019    Stress incontinence 04/12/2018    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    Tremor        Objective:    Vitals:  Vitals:    02/25/25 0922 02/25/25 1010 02/25/25 1011   BP: 118/64 (!) 126/56 (!) 124/58   Pulse: (!) 58     Resp: 16     Temp: 98 °F (36.7 °C)     TempSrc: Oral     SpO2: 96%     Weight: 60.5 kg (133 lb 6.1 oz)     Height: 5' 1" (1.549 m)     PainSc: 0-No pain         Physical Exam  Vitals reviewed. "   Constitutional:       General: She is not in acute distress.     Appearance: She is well-developed.   Eyes:      General:         Right eye: No discharge.         Left eye: No discharge.      Conjunctiva/sclera: Conjunctivae normal.   Cardiovascular:      Rate and Rhythm: Normal rate and regular rhythm.   Pulmonary:      Effort: Pulmonary effort is normal. No respiratory distress.   Skin:     General: Skin is warm and dry.   Neurological:      Mental Status: She is alert and oriented to person, place, and time.   Psychiatric:         Behavior: Behavior normal.         Thought Content: Thought content normal.         Judgment: Judgment normal.         Data:  Last TSH >4    Zoë Garcia MD  Internal Medicine         [1]   Current Outpatient Medications on File Prior to Visit   Medication Sig Dispense Refill    diclofenac sodium (VOLTAREN) 1 % Gel Apply 2 g topically 2 (two) times daily as needed (arthritis pain). 100 g 5    estradioL (ESTRACE) 0.01 % (0.1 mg/gram) vaginal cream PLACE 1 GRAM (1/4 APPLICATORFUL) VAGINALLY TWICE A WEEK (STRESS INCONTINENCE WITH ATROPHIC VAGINITIS) 1 each 3    hydrOXYzine HCL (ATARAX) 10 MG Tab Take 1 tablet (10 mg total) by mouth nightly as needed (itching). 60 tablet 1    ketoconazole (NIZORAL) 2 % shampoo Apply topically twice a week. 120 mL 1    levothyroxine (SYNTHROID) 125 MCG tablet Take 1 tablet (125 mcg total) by mouth once daily. 90 tablet 2    NIFEdipine (PROCARDIA-XL) 60 MG (OSM) 24 hr tablet TAKE 1 TABLET ONE TIME DAILY 90 tablet 1    olmesartan (BENICAR) 40 MG tablet TAKE 1 TABLET BY MOUTH ONCE DAILY 90 tablet 3    propranoloL (INDERAL) 20 MG tablet TAKE 1 TABLET TWICE DAILY 180 tablet 3    triamcinolone acetonide 0.1% (KENALOG) 0.1 % cream Apply topically 2 (two) times daily as needed (rash on body). Avoid use on face, body folds, groin/genitalia. 454 g 1    venlafaxine (EFFEXOR-XR) 150 MG Cp24 Take 1 capsule (150 mg total) by mouth once daily. 90 capsule 3    VIT  C/E/ZN/COPPR/LUTEIN/ZEAXAN (PRESERVISION AREDS 2 ORAL) Take by mouth.      [DISCONTINUED] b complex vitamins tablet Take 1 tablet by mouth once daily. (Patient not taking: Reported on 2/25/2025)      [DISCONTINUED] cholecalciferol, vitamin D3, 100 mcg (4,000 unit) Cap capsule Take by mouth once daily. (Patient not taking: Reported on 2/25/2025)      [DISCONTINUED] hydrOXYzine HCL (ATARAX) 25 MG tablet Take 1 tablet (25 mg total) by mouth 3 (three) times daily as needed for Itching (may cause drowsiness). (Patient not taking: Reported on 2/25/2025) 15 tablet 0    [DISCONTINUED] magnesium oxide 500 mg Tab Take 1 tablet by mouth once daily. (Patient not taking: Reported on 2/25/2025)      [DISCONTINUED] mirabegron (MYRBETRIQ) 50 mg Tb24 Take 1 tablet (50 mg total) by mouth once daily. (Patient not taking: Reported on 2/25/2025) 90 tablet 2    [DISCONTINUED] permethrin (ELIMITE) 5 % cream Thoroughly massage cream from neck to soles of feet; leave on for 8 to 14 hours before removing (shower or bath); for infants and the elderly, also apply on the hairline, neck, scalp, temple, and forehead; repeat in 1 week. (Patient not taking: Reported on 2/25/2025) 60 g 1    [DISCONTINUED] temazepam (RESTORIL) 15 mg Cap TAKE 1 CAPSULE BY MOUTH AT NIGHT AS NEEDED FOR INSOMNIA (Patient not taking: Reported on 2/25/2025) 15 capsule 0     No current facility-administered medications on file prior to visit.

## 2025-03-02 ENCOUNTER — PATIENT MESSAGE (OUTPATIENT)
Dept: FAMILY MEDICINE | Facility: CLINIC | Age: OVER 89
End: 2025-03-02
Payer: MEDICARE

## 2025-03-06 DIAGNOSIS — G47.01 INSOMNIA DUE TO MEDICAL CONDITION: ICD-10-CM

## 2025-03-06 RX ORDER — MIRTAZAPINE 7.5 MG/1
TABLET, FILM COATED ORAL
Refills: 0 | OUTPATIENT
Start: 2025-03-06

## 2025-03-06 NOTE — TELEPHONE ENCOUNTER
Refill Decision Note   Bhakti Phipps  is requesting a refill authorization.  Brief Assessment and Rationale for Refill:  Quick Discontinue     Medication Therapy Plan:  no sig Pharmacy is requesting new scripts for the following medications without required information, (sig/ frequency/qty/etc)      Medication Reconciliation Completed: No     Comments: Pharmacies have been requesting medications for patients without required information, (sig, frequency, qty, etc.). In addition, requests are sent for medication(s) pt. are currently not taking, and medications patients have never taken.    We have spoken to the pharmacies about these request types and advised their teams previously that we are unable to assess these New Script requests and require all details for these requests. This is a known issue and has been reported.     Note composed:12:41 PM 03/06/2025

## 2025-03-06 NOTE — TELEPHONE ENCOUNTER
No care due was identified.  [facility] Embedded Care Due Messages. Reference number: [id number].   3/06/2025 8:45:29 AM CST

## 2025-03-12 ENCOUNTER — CLINICAL SUPPORT (OUTPATIENT)
Dept: FAMILY MEDICINE | Facility: CLINIC | Age: OVER 89
End: 2025-03-12
Payer: MEDICARE

## 2025-03-12 VITALS — HEART RATE: 72 BPM | RESPIRATION RATE: 16 BRPM | DIASTOLIC BLOOD PRESSURE: 58 MMHG | SYSTOLIC BLOOD PRESSURE: 120 MMHG

## 2025-03-12 DIAGNOSIS — I10 ESSENTIAL HYPERTENSION: Primary | ICD-10-CM

## 2025-03-12 PROCEDURE — 99999 PR PBB SHADOW E&M-EST. PATIENT-LVL III: CPT | Mod: PBBFAC,HCNC,,

## 2025-03-12 NOTE — PROGRESS NOTES
Pt here for BP check. Pt has [employer] BP monitor. Unable to check accuracy in the office . Pt was advisded to bring cuff to the O bar at [facility].       ,      Blood pressure reading after 15 minutes was 120 58  /, Pulse 72.  Dr. Garcia notified.    Bhakti Phipps 89 y.o. female is here today for Blood Pressure check.   History of HTN yes.    Review of patient's allergies indicates:   Allergen Reactions    Sulfa (sulfonamide antibiotics) Swelling     Creatinine   Date Value Ref Range Status   02/25/2025 0.9 0.5 - 1.4 mg/dL Final     Sodium   Date Value Ref Range Status   02/25/2025 141 136 - 145 mmol/L Final     Potassium   Date Value Ref Range Status   02/25/2025 4.9 3.5 - 5.1 mmol/L Final   ]  Patient verifies taking blood pressure medications on a regular basis at the same time of the day.   Current Medications[1]  Does patient have record of home blood pressure readings no. .   Last dose of blood pressure medication was taken at 8:15 am today   Patient is asymptomatic.   Complains of feeling tired          [1]   Current Outpatient Medications:     diclofenac sodium (VOLTAREN) 1 % Gel, Apply 2 g topically 2 (two) times daily as needed (arthritis pain)., Disp: 100 g, Rfl: 5    estradioL (ESTRACE) 0.01 % (0.1 mg/gram) vaginal cream, PLACE 1 GRAM (1/4 APPLICATORFUL) VAGINALLY TWICE A WEEK (STRESS INCONTINENCE WITH ATROPHIC VAGINITIS), Disp: 1 each, Rfl: 3    hydrOXYzine HCL (ATARAX) 10 MG Tab, Take 1 tablet (10 mg total) by mouth nightly as needed (itching)., Disp: 60 tablet, Rfl: 1    ketoconazole (NIZORAL) 2 % shampoo, Apply topically twice a week., Disp: 120 mL, Rfl: 1    levothyroxine (SYNTHROID) 125 MCG tablet, Take 1 tablet (125 mcg total) by mouth once daily., Disp: 90 tablet, Rfl: 2    mirtazapine (REMERON) 7.5 MG Tab, Take 1 tablet (7.5 mg total) by mouth nightly as needed (insomnia)., Disp: 30 tablet, Rfl: 1    NIFEdipine (PROCARDIA-XL) 60 MG (OSM) 24 hr tablet, TAKE 1 TABLET ONE TIME DAILY,  Disp: 90 tablet, Rfl: 1    olmesartan (BENICAR) 40 MG tablet, TAKE 1 TABLET BY MOUTH ONCE DAILY, Disp: 90 tablet, Rfl: 3    propranoloL (INDERAL) 20 MG tablet, TAKE 1 TABLET TWICE DAILY, Disp: 180 tablet, Rfl: 3    temazepam (RESTORIL) 15 mg Cap, Take 1 capsule (15 mg total) by mouth nightly as needed (insomnia, rare use)., Disp: 15 capsule, Rfl: 0    triamcinolone acetonide 0.1% (KENALOG) 0.1 % cream, Apply topically 2 (two) times daily as needed (rash on body). Avoid use on face, body folds, groin/genitalia., Disp: 454 g, Rfl: 1    venlafaxine (EFFEXOR-XR) 150 MG Cp24, Take 1 capsule (150 mg total) by mouth once daily., Disp: 90 capsule, Rfl: 3    vibegron 75 mg Tab, Take 1 tablet (75 mg total) by mouth once daily., Disp: 30 tablet, Rfl: 0    VIT C/E/ZN/COPPR/LUTEIN/ZEAXAN (PRESERVISION AREDS 2 ORAL), Take by mouth., Disp: , Rfl:

## 2025-03-13 ENCOUNTER — PATIENT MESSAGE (OUTPATIENT)
Dept: FAMILY MEDICINE | Facility: CLINIC | Age: OVER 89
End: 2025-03-13
Payer: MEDICARE

## 2025-03-15 ENCOUNTER — PATIENT MESSAGE (OUTPATIENT)
Dept: RHEUMATOLOGY | Facility: CLINIC | Age: OVER 89
End: 2025-03-15
Payer: MEDICARE

## 2025-03-16 ENCOUNTER — PATIENT MESSAGE (OUTPATIENT)
Dept: FAMILY MEDICINE | Facility: CLINIC | Age: OVER 89
End: 2025-03-16
Payer: MEDICARE

## 2025-03-16 DIAGNOSIS — N39.46 MIXED STRESS AND URGE URINARY INCONTINENCE: ICD-10-CM

## 2025-03-16 NOTE — TELEPHONE ENCOUNTER
No care due was identified.  [facility] Embedded Care Due Messages. Reference number: [id number].   3/16/2025 6:57:20 PM CDT

## 2025-03-18 ENCOUNTER — TELEPHONE (OUTPATIENT)
Dept: FAMILY MEDICINE | Facility: CLINIC | Age: OVER 89
End: 2025-03-18
Payer: MEDICARE

## 2025-03-18 DIAGNOSIS — R30.0 DYSURIA: Primary | ICD-10-CM

## 2025-03-18 NOTE — TELEPHONE ENCOUNTER
----- Message from Telunjuk sent at 3/18/2025 10:51 AM CDT -----  Regarding: Appt  Contact: [phone]  Type:  Same Day Appointment RequestCaller is requesting a same day appointment.  Caller declined first available appointment listed below.  Name of Caller:PatientWhen is the first available appointment? No dates in epicSymptom: Urination PainOutcome: Schedule a same-day appointment or talk to a nurse or provider within 1 hour.Reason: Caller denied all higher acuity questionsBe Call Back Number:[phone] Additional Information: she would like to know if she can't be seen she would like medication called in

## 2025-03-18 NOTE — TELEPHONE ENCOUNTER
I can order a urinalysis, but I can't send something in unless there is evidence of infection. If she can't wait for that, she would need an appointment with someone available, or to go to urgent care.

## 2025-03-19 ENCOUNTER — TELEPHONE (OUTPATIENT)
Dept: FAMILY MEDICINE | Facility: CLINIC | Age: OVER 89
End: 2025-03-19
Payer: MEDICARE

## 2025-03-19 ENCOUNTER — OFFICE VISIT (OUTPATIENT)
Dept: FAMILY MEDICINE | Facility: CLINIC | Age: OVER 89
End: 2025-03-19
Payer: MEDICARE

## 2025-03-19 VITALS
DIASTOLIC BLOOD PRESSURE: 54 MMHG | OXYGEN SATURATION: 97 % | TEMPERATURE: 98 F | BODY MASS INDEX: 25.07 KG/M2 | HEIGHT: 61 IN | SYSTOLIC BLOOD PRESSURE: 124 MMHG | WEIGHT: 132.81 LBS | HEART RATE: 60 BPM

## 2025-03-19 DIAGNOSIS — R30.0 DYSURIA: ICD-10-CM

## 2025-03-19 DIAGNOSIS — R21 RASH AND NONSPECIFIC SKIN ERUPTION: ICD-10-CM

## 2025-03-19 DIAGNOSIS — N30.00 ACUTE INFECTIVE CYSTITIS: Primary | ICD-10-CM

## 2025-03-19 DIAGNOSIS — H35.3220 EXUDATIVE AGE-RELATED MACULAR DEGENERATION OF LEFT EYE, UNSPECIFIED STAGE: ICD-10-CM

## 2025-03-19 LAB
BILIRUBIN, UA POC OHS: NEGATIVE
BLOOD, UA POC OHS: ABNORMAL
CLARITY, UA POC OHS: ABNORMAL
COLOR, UA POC OHS: YELLOW
GLUCOSE, UA POC OHS: NEGATIVE
KETONES, UA POC OHS: NEGATIVE
LEUKOCYTES, UA POC OHS: ABNORMAL
NITRITE, UA POC OHS: NEGATIVE
PH, UA POC OHS: 5.5
PROTEIN, UA POC OHS: 30
SPECIFIC GRAVITY, UA POC OHS: 1.02
UROBILINOGEN, UA POC OHS: 0.2

## 2025-03-19 PROCEDURE — 3288F FALL RISK ASSESSMENT DOCD: CPT | Mod: HCNC,CPTII,S$GLB,

## 2025-03-19 PROCEDURE — 99213 OFFICE O/P EST LOW 20 MIN: CPT | Mod: HCNC,S$GLB,,

## 2025-03-19 PROCEDURE — 87186 SC STD MICRODIL/AGAR DIL: CPT | Mod: HCNC

## 2025-03-19 PROCEDURE — 1100F PTFALLS ASSESS-DOCD GE2>/YR: CPT | Mod: HCNC,CPTII,S$GLB,

## 2025-03-19 PROCEDURE — 1126F AMNT PAIN NOTED NONE PRSNT: CPT | Mod: HCNC,CPTII,S$GLB,

## 2025-03-19 PROCEDURE — 87086 URINE CULTURE/COLONY COUNT: CPT | Mod: HCNC

## 2025-03-19 PROCEDURE — 99999 PR PBB SHADOW E&M-EST. PATIENT-LVL III: CPT | Mod: PBBFAC,HCNC,,

## 2025-03-19 PROCEDURE — 87088 URINE BACTERIA CULTURE: CPT | Mod: HCNC

## 2025-03-19 PROCEDURE — 81003 URINALYSIS AUTO W/O SCOPE: CPT | Mod: QW,HCNC,S$GLB,

## 2025-03-19 RX ORDER — HYDROXYZINE HYDROCHLORIDE 10 MG/1
10 TABLET, FILM COATED ORAL NIGHTLY PRN
Qty: 60 TABLET | Refills: 1 | Status: CANCELLED | OUTPATIENT
Start: 2025-03-19

## 2025-03-19 RX ORDER — CEPHALEXIN 500 MG/1
500 CAPSULE ORAL EVERY 12 HOURS
Qty: 10 CAPSULE | Refills: 0 | Status: SHIPPED | OUTPATIENT
Start: 2025-03-19 | End: 2025-03-24

## 2025-03-19 RX ORDER — TRIAMCINOLONE ACETONIDE 1 MG/G
CREAM TOPICAL 2 TIMES DAILY PRN
Qty: 454 G | Refills: 1 | Status: CANCELLED | OUTPATIENT
Start: 2025-03-19

## 2025-03-19 NOTE — TELEPHONE ENCOUNTER
----- Message from Ashli sent at 3/19/2025  9:37 AM CDT -----  Type:  Needs Medical AdviceWho Called: pt Symptoms (please be specific): Painful Urination  How long has patient had these symptoms:  started morning of 03/18Pharmacy name and phone #:  [phone] - [place] - [id number] [facility] [place] [address]Ckdqr: [phone] Fax: [phone] the patient rather a call back or a response via [facility]? Call back Best Call Back Number: [phone] Additional Information: pt would like to know if she can bring urine sample in to have testing ran on it,  please call to advise, Thank You.

## 2025-03-19 NOTE — PROGRESS NOTES
[facility]  [address]  [place] [address]    Subjective    Chief Complaint:   Chief Complaint   Patient presents with    Possible UTI     Painful urination, urgency, no blood in urine       History of Present Illness:     Bhakti Phipps is a(n) 89 y.o. female with past medical history as noted below who presents to the clinic today for painful urination.    Patient reports 3 day onset of urinary symptoms including dysuria, urgency, and frequency. Denies hematuria, foul-smelling urine, flank pain, fever, abdominal pain, and lower abdominal/ pelvic pressure.       Problem List: Problem List[1]    Current Outpatient Medications:   Current Outpatient Medications   Medication Instructions    diclofenac sodium (VOLTAREN) 2 g, Topical (Top), 2 times daily PRN    estradioL (ESTRACE) 0.01 % (0.1 mg/gram) vaginal cream PLACE 1 GRAM (1/4 APPLICATORFUL) VAGINALLY TWICE A WEEK (STRESS INCONTINENCE WITH ATROPHIC VAGINITIS)    hydrOXYzine HCL (ATARAX) 10 mg, Oral, Nightly PRN    ketoconazole (NIZORAL) 2 % shampoo Topical (Top), Twice weekly    levothyroxine (SYNTHROID) 125 mcg, Oral, Daily    mirtazapine (REMERON) 7.5 mg, Oral, Nightly PRN    NIFEdipine (PROCARDIA-XL) 60 mg, Oral    olmesartan (BENICAR) 40 mg, Oral    propranoloL (INDERAL) 20 MG tablet TAKE 1 TABLET TWICE DAILY    temazepam (RESTORIL) 15 mg, Oral, Nightly PRN    triamcinolone acetonide 0.1% (KENALOG) 0.1 % cream Topical (Top), 2 times daily PRN, Avoid use on face, body folds, groin/genitalia.    venlafaxine (EFFEXOR-XR) 150 mg, Oral, Daily    vibegron 75 mg, Oral, Daily    VIT C/E/ZN/COPPR/LUTEIN/ZEAXAN (PRESERVISION AREDS 2 ORAL) Take by mouth.       Surgical History:   Past Surgical History:   Procedure Laterality Date    CHOLECYSTECTOMY  10-11    lap ayse    HYSTERECTOMY      with oopherectomy    JOINT REPLACEMENT      L knee    RADICAL NECK DISSECTION      for melanoma       Family History:   Family  "History   Problem Relation Name Age of Onset    Pancreatic cancer Mother      Leukemia Father      Lung cancer Brother      Liver cancer Sister         Allergies:   Review of patient's allergies indicates:   Allergen Reactions    Sulfa (sulfonamide antibiotics) Swelling       Tobacco Status:   Tobacco Use: Medium Risk (3/19/2025)    Patient History     Smoking Tobacco Use: Former     Smokeless Tobacco Use: Never     Passive Exposure: Past       Sexual Activity:   Social History     Substance and Sexual Activity   Sexual Activity Never       Alcohol Use:   Social History     Substance and Sexual Activity   Alcohol Use No         Objective       Vitals:    03/19/25 1432   BP: (!) 124/54   Pulse: 60   SpO2: 97%   Weight: 60.2 kg (132 lb 13.2 oz)   Height: 5' 1" (1.549 m)       Review of Systems   Constitutional:  Negative for chills and fever.   Respiratory:  Negative for cough and shortness of breath.    Cardiovascular:  Negative for chest pain.       Physical Exam  Constitutional:       General: She is not in acute distress.     Appearance: Normal appearance.   HENT:      Head: Normocephalic and atraumatic.   Cardiovascular:      Rate and Rhythm: Normal rate and regular rhythm.      Heart sounds: Normal heart sounds. No murmur heard.  Pulmonary:      Effort: Pulmonary effort is normal. No respiratory distress.      Breath sounds: Normal breath sounds. No wheezing.   Abdominal:      Tenderness: There is no right CVA tenderness or left CVA tenderness.   Skin:     General: Skin is warm.   Neurological:      Mental Status: She is alert and oriented to person, place, and time.   Psychiatric:         Behavior: Behavior normal.           Assessment and Plan:    1. Acute infective cystitis  -     Urine Culture High Risk; Future; Expected date: 03/19/2025  -     cephALEXin (KEFLEX) 500 MG capsule; Take 1 capsule (500 mg total) by mouth every 12 (twelve) hours. for 5 days  Dispense: 10 capsule; Refill: 0    2. Dysuria  -     " POCT Urinalysis(Instrument)    3. Exudative age-related macular degeneration of left eye, unspecified stage        Visit summary:    Bhakti Phipps presented today for dysuria.    No systemic symptoms such as confusion, fever/chills. Afebrile. UA mildly positive, send culture, start Keflex      Patient is stable for outpatient management but was instructed to report to ER if symptoms become severe.    Follow up: if symptoms worsen or fail to improve.      Macy Negrete PA-C    This note was created partially with voice dictation software and is prone to errors. This note has been reviewed by me but some errors are inevitable.          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

## 2025-03-19 NOTE — TELEPHONE ENCOUNTER
Called pt to get her an appt for painful urination. Pt wanted to been seen asap. Got pt an appt for today. Pt said thank you and hung up.

## 2025-03-21 LAB — BACTERIA UR CULT: ABNORMAL

## 2025-03-24 ENCOUNTER — RESULTS FOLLOW-UP (OUTPATIENT)
Dept: FAMILY MEDICINE | Facility: CLINIC | Age: OVER 89
End: 2025-03-24

## 2025-03-24 ENCOUNTER — TELEPHONE (OUTPATIENT)
Dept: DERMATOLOGY | Facility: CLINIC | Age: OVER 89
End: 2025-03-24
Payer: MEDICARE

## 2025-03-24 NOTE — TELEPHONE ENCOUNTER
Call placed per provider request Re: need for additional medication refill.  Patient advised to return office call at her earliest convenience.

## 2025-04-07 ENCOUNTER — OFFICE VISIT (OUTPATIENT)
Dept: RHEUMATOLOGY | Facility: CLINIC | Age: OVER 89
End: 2025-04-07
Payer: MEDICARE

## 2025-04-07 VITALS
WEIGHT: 135.13 LBS | SYSTOLIC BLOOD PRESSURE: 131 MMHG | BODY MASS INDEX: 25.51 KG/M2 | HEART RATE: 76 BPM | DIASTOLIC BLOOD PRESSURE: 61 MMHG | HEIGHT: 61 IN

## 2025-04-07 DIAGNOSIS — M15.0 PRIMARY OSTEOARTHRITIS INVOLVING MULTIPLE JOINTS: Primary | ICD-10-CM

## 2025-04-07 DIAGNOSIS — R76.8 RHEUMATOID FACTOR POSITIVE: ICD-10-CM

## 2025-04-07 PROCEDURE — 99999 PR PBB SHADOW E&M-EST. PATIENT-LVL III: CPT | Mod: PBBFAC,HCNC,, | Performed by: INTERNAL MEDICINE

## 2025-04-07 PROCEDURE — 1126F AMNT PAIN NOTED NONE PRSNT: CPT | Mod: HCNC,CPTII,S$GLB, | Performed by: INTERNAL MEDICINE

## 2025-04-07 PROCEDURE — 3288F FALL RISK ASSESSMENT DOCD: CPT | Mod: HCNC,CPTII,S$GLB, | Performed by: INTERNAL MEDICINE

## 2025-04-07 PROCEDURE — 1100F PTFALLS ASSESS-DOCD GE2>/YR: CPT | Mod: HCNC,CPTII,S$GLB, | Performed by: INTERNAL MEDICINE

## 2025-04-07 PROCEDURE — 1159F MED LIST DOCD IN RCRD: CPT | Mod: HCNC,CPTII,S$GLB, | Performed by: INTERNAL MEDICINE

## 2025-04-07 PROCEDURE — 99214 OFFICE O/P EST MOD 30 MIN: CPT | Mod: HCNC,S$GLB,, | Performed by: INTERNAL MEDICINE

## 2025-04-07 ASSESSMENT — ROUTINE ASSESSMENT OF PATIENT INDEX DATA (RAPID3)
PSYCHOLOGICAL DISTRESS SCORE: 0
FATIGUE SCORE: 2.2
TOTAL RAPID3 SCORE: 0.67
PATIENT GLOBAL ASSESSMENT SCORE: 0.5
PAIN SCORE: 0.5
MDHAQ FUNCTION SCORE: 0.3

## 2025-04-07 NOTE — PROGRESS NOTES
Subjective:          Chief Complaint: Bhakti Phipps is a 89 y.o. female who had concerns including Disease Management.    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     Patient is an 89-year-old female she was referred by Dr. Li to me first seen 8/20222 due to arthritis and concern for possible inflammatory arthritis. She also received injections within the 3rd MCP with Celestone own and bupivacaine 11/23/2021. Patient states the right 3rd MCP is first joint with severe swelling. She notes pain in various joints MCP and PIP mostly w/ [id number] IP deformed from injury with dog bite.   Serologies negative.   Inflammatory markers normal    Today she notes swelling in the left MCP with some warmth but no pain. She will put topical voltaren on it. She absolutely does not want me to inject it today. States she can do all the activities she would like.   She is still using tylenol PRN daily but did stop Tylenol PM for remeron with Dr. Garcia.     She denies morning stiffness, notes worse with use. No pain with rest and much better with Relafen 500mg BID. No Gi upset. Patient's last renal function shows a creatinine that is within normal limits, but a slight decline in her renal fucntion.   She take ones tylenol PM at night.   Patient notes left sided LBP, s/p left TKA (2006) this has done very well. She denies PUD, GI bleed or renal disease.     Imaging studies which I cannot review but were included in Dr. Mitchell's notes show AP lateral oblique images of the right hand with significant arthritic changes particularly noticed in the right MCP joints.    Denies  symptoms of Raynaud's, fever, chills , dry mouth, dry eyes, dysphagia, tight  skin, signs of pleurisy , pericarditis, rashes/photosensitivity, oral ulcers,  chest pain, shortness of breath, GI complaints/IBD,  urinary complaints or hx of proteinura/nephritis,  alopecia , fatigue, photosensitivity, headache, change in vision, face & jaw pain, ,psoriasis,  "numbness, anxiety, abdomen pain, nausea, vomiting. No hx of DVT, PE, miscarriages where applicable.      Patient with macular degeneration receives injections for this.   + Sulfa allergy.     10/2024: She has bilateral thumb pain but states voltaren gel alleviates pain in 10 minutes. Can have back pain with "working in garden for 1 hour" but this resolves with tylenol arthritis BID. She will sometimes use Tylenol PM  She is not using any of the tylenol #3 so we will not refill right now.   REVIEW OF SYSTEMS:    Review of Systems   Constitutional:  Negative for fever.   Eyes:  Positive for redness.   Respiratory:  Negative for cough and shortness of breath.    Cardiovascular:  Negative for chest pain.   Gastrointestinal:  Negative for constipation and diarrhea.   Genitourinary:  Negative for dysuria.   Musculoskeletal:  Positive for back pain and joint pain.   Skin:  Negative for rash.   Neurological:  Negative for headaches.   Endo/Heme/Allergies:  Does not bruise/bleed easily.               Objective:            Past Medical History:   Diagnosis Date    Anxiety     Arthritis     Basal cell carcinoma of skin of other and unspecified parts of face 11/21/2012    Dx updated per 2019 IMO Load    Essential hypertension 05/21/2012    History of melanoma     at age of 26    Hyperlipidemia 11/10/2018    Hypertension     Hypothyroidism due to acquired atrophy of thyroid 05/21/2012    Idiopathic scoliosis of thoracolumbar spine 02/28/2019    Insomnia 02/25/2014    Intermediate stage nonexudative age-related macular degeneration of both eyes 04/12/2018    Melanoma     age 26    Osteoarthritis 05/21/2012    Postmenopausal 08/13/2019    Stress incontinence 04/12/2018    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    Tremor      Family History   Problem Relation Name Age of Onset    Pancreatic cancer Mother      Leukemia Father      Lung cancer Brother      Liver cancer Sister       Social History     Tobacco Use    Smoking status: Former     " Current packs/day: 0.00     Types: Cigarettes     Quit date: 1975     Years since quittin.9     Passive exposure: Past    Smokeless tobacco: Never   Substance Use Topics    Alcohol use: No    Drug use: No         Current Outpatient Medications on File Prior to Visit   Medication Sig Dispense Refill    diclofenac sodium (VOLTAREN) 1 % Gel Apply 2 g topically 2 (two) times daily as needed (arthritis pain). 100 g 5    estradioL (ESTRACE) 0.01 % (0.1 mg/gram) vaginal cream PLACE 1 GRAM (1/4 APPLICATORFUL) VAGINALLY TWICE A WEEK (STRESS INCONTINENCE WITH ATROPHIC VAGINITIS) 1 each 3    hydrOXYzine HCL (ATARAX) 10 MG Tab Take 1 tablet (10 mg total) by mouth nightly as needed (itching). 60 tablet 1    levothyroxine (SYNTHROID) 125 MCG tablet Take 1 tablet (125 mcg total) by mouth once daily. 90 tablet 2    mirtazapine (REMERON) 7.5 MG Tab Take 1 tablet (7.5 mg total) by mouth nightly as needed (insomnia). 30 tablet 1    NIFEdipine (PROCARDIA-XL) 60 MG (OSM) 24 hr tablet TAKE 1 TABLET ONE TIME DAILY 90 tablet 1    olmesartan (BENICAR) 40 MG tablet TAKE 1 TABLET BY MOUTH ONCE DAILY 90 tablet 3    propranoloL (INDERAL) 20 MG tablet TAKE 1 TABLET TWICE DAILY 180 tablet 3    temazepam (RESTORIL) 15 mg Cap Take 1 capsule (15 mg total) by mouth nightly as needed (insomnia, rare use). 15 capsule 0    triamcinolone acetonide 0.1% (KENALOG) 0.1 % cream Apply topically 2 (two) times daily as needed (rash on body). Avoid use on face, body folds, groin/genitalia. 454 g 1    venlafaxine (EFFEXOR-XR) 150 MG Cp24 Take 1 capsule (150 mg total) by mouth once daily. 90 capsule 3    vibegron 75 mg Tab Take 1 tablet (75 mg total) by mouth once daily. 90 tablet 1    VIT C/E/ZN/COPPR/LUTEIN/ZEAXAN (PRESERVISION AREDS 2 ORAL) Take by mouth.      ketoconazole (NIZORAL) 2 % shampoo Apply topically twice a week. 120 mL 1     No current facility-administered medications on file prior to visit.       Vitals:    25 1014   BP: 131/61    Pulse: 76       Physical Exam:    Physical Exam  Constitutional:       Appearance: She is well-developed.   HENT:      Head: Normocephalic and atraumatic.   Eyes:      Pupils: Pupils are equal, round, and reactive to light.   Cardiovascular:      Rate and Rhythm: Normal rate and regular rhythm.      Heart sounds: Normal heart sounds.   Pulmonary:      Effort: Pulmonary effort is normal.      Breath sounds: Normal breath sounds.   Musculoskeletal:      Right shoulder: No swelling or tenderness. Normal range of motion.      Left shoulder: No swelling or tenderness. Normal range of motion.      Right elbow: No swelling. Normal range of motion. No tenderness.      Left elbow: No swelling. Normal range of motion. No tenderness.      Right wrist: No swelling or tenderness. Decreased range of motion.      Left wrist: No swelling or tenderness. Decreased range of motion.      Right hand: Deformity and tenderness present. No swelling. Decreased range of motion.      Left hand: Deformity and tenderness present. No swelling. Decreased range of motion.      Cervical back: Normal range of motion.      Right knee: No swelling. Normal range of motion. No tenderness.      Left knee: No swelling. Normal range of motion. No tenderness.      Right foot: Normal range of motion. No swelling or tenderness.      Left foot: Normal range of motion. No swelling or tenderness.      Comments: Bony hypertrophy with some deformity of the PIP and DIP joints, no swelling or tenderness at the MCP joints.        Skin:     General: Skin is warm and dry.   Neurological:      Mental Status: She is alert and oriented to person, place, and time.   Psychiatric:         Behavior: Behavior normal.             4/11/2024     9:14 AM   Rapid3 Question Responses and Scores   MDHAQ Score 1.4   Psychologic Score 3.3   Pain Score 2   When you awakened in the morning OVER THE LAST WEEK, did you feel stiff? Yes   If Yes, please indicate the number of hours until  you are as limber as you will be for the day 4   Fatigue Score 2   Global Health Score 2   RAPID3 Score 2.89           Assessment:       Encounter Diagnoses   Name Primary?    Primary osteoarthritis involving multiple joints Yes    Rheumatoid factor positive                 Plan:        Primary osteoarthritis involving multiple joints    Rheumatoid factor positive  Comments:  weakly + which may just be normal for age.      Patient is an 89 y/o female with MCP, PIP and some DIP arthritis. RF 19 (very low , neg CCP)   She developed swelling of the MCP acutely and noted by hand surgeon to have significant MCP arthritis thusly referred to Rheumatology for eval of possible inflammatory arthritis.   She has intermittent non painful swelling of joints but manages quite well functionally and not in pain.   I am not going to start HCQ as yet unless we have more pain, interference with her ADLs   No features of SLE, no Ps or IBD    For now:  Ok for Voltaren GEL TID PRN sending to [facility] to see if they will fill for generic.   Tylenol 650mg up to 3 times daily.   Not filling tylenol #3 she is not using at this time.      Follow up in about 8 months (around 12/7/2025). Or anytime with flare       30min consultation with greater than 50% of that time included Preparing to see the patient (review records, tests), Obtaining and/or reviewing separately obtained historical data, Performing a medically appropriate examination and/or evaluation , Ordering medications, tests, and/or procedures, Referring and communicating with other healthcare professionals , Documenting clinical information in the electronic or other health record and Independently interpreting results  (as warranted) & communicating results to the patient/family/caregiver. All questions answered.    Thank you for allowing me to participate in the care of this very pleasant patient.

## 2025-04-29 DIAGNOSIS — G47.01 INSOMNIA DUE TO MEDICAL CONDITION: ICD-10-CM

## 2025-04-29 DIAGNOSIS — R21 RASH AND NONSPECIFIC SKIN ERUPTION: ICD-10-CM

## 2025-04-29 RX ORDER — CEPHALEXIN 500 MG/1
CAPSULE ORAL
Refills: 0 | OUTPATIENT
Start: 2025-04-29

## 2025-04-29 RX ORDER — MIRTAZAPINE 7.5 MG/1
TABLET, FILM COATED ORAL
Refills: 0 | OUTPATIENT
Start: 2025-04-29

## 2025-04-29 RX ORDER — TEMAZEPAM 15 MG/1
CAPSULE ORAL
Refills: 0 | OUTPATIENT
Start: 2025-04-29

## 2025-04-29 RX ORDER — HYDROXYZINE HYDROCHLORIDE 10 MG/1
TABLET, FILM COATED ORAL
Refills: 0 | OUTPATIENT
Start: 2025-04-29

## 2025-04-29 NOTE — TELEPHONE ENCOUNTER
No care due was identified.  [facility] Embedded Care Due Messages. Reference number: [id number].   4/29/2025 10:17:53 AM CDT

## 2025-04-29 NOTE — TELEPHONE ENCOUNTER
Refill Decision Note   Bhakti Phipps  is requesting a refill authorization.  Brief Assessment and Rationale for Refill:  Quick Discontinue     Medication Therapy Plan:    Pharmacy is requesting new scripts for the following medications without required information, (sig/ frequency/qty/etc)      Medication Reconciliation Completed: No     Comments: Pharmacies have been requesting medications for patients without required information, (sig, frequency, qty, etc.). In addition, requests are sent for medication(s) pt. are currently not taking, and medications patients have never taken.    We have spoken to the pharmacies about these request types and advised their teams previously that we are unable to assess these New Script requests and require all details for these requests. This is a known issue and has been reported.     Note composed:11:10 AM 04/29/2025

## 2025-05-03 DIAGNOSIS — I10 ESSENTIAL HYPERTENSION: ICD-10-CM

## 2025-05-03 RX ORDER — NIFEDIPINE 60 MG/1
60 TABLET, EXTENDED RELEASE ORAL
Qty: 90 TABLET | Refills: 3 | Status: SHIPPED | OUTPATIENT
Start: 2025-05-03

## 2025-05-03 RX ORDER — OLMESARTAN MEDOXOMIL 40 MG/1
40 TABLET ORAL
Qty: 90 TABLET | Refills: 3 | Status: SHIPPED | OUTPATIENT
Start: 2025-05-03

## 2025-05-03 NOTE — TELEPHONE ENCOUNTER
Refill Decision Note   Bhakti Desire  is requesting a refill authorization.  Brief Assessment and Rationale for Refill:  Approve     Medication Therapy Plan:       Medication Reconciliation Completed: No   Comments:     No Care Gaps recommended.     Note composed:6:33 AM 05/03/2025

## 2025-05-03 NOTE — TELEPHONE ENCOUNTER
No care due was identified.  [facility] Embedded Care Due Messages. Reference number: [id number].   5/03/2025 2:03:43 AM CDT

## 2025-05-14 DIAGNOSIS — L21.9 SEBORRHEIC DERMATITIS: ICD-10-CM

## 2025-05-14 RX ORDER — KETOCONAZOLE 20 MG/ML
SHAMPOO, SUSPENSION TOPICAL
Qty: 120 ML | Refills: 0 | Status: SHIPPED | OUTPATIENT
Start: 2025-05-14

## 2025-05-14 NOTE — TELEPHONE ENCOUNTER
Refill Routing Note   Medication(s) are not appropriate for processing by [facility] for the following reason(s):        Outside of protocol    [facility] action(s):  Route             Appointments  past 12m or future 3m with PCP    Date Provider   Last Visit   2/25/2025 Zoë Garcia MD   Next Visit   8/26/2025 Zoë Garcia MD   ED visits in past 90 days: 0        Note composed:1:01 PM 05/14/2025

## 2025-05-28 DIAGNOSIS — G47.01 INSOMNIA DUE TO MEDICAL CONDITION: ICD-10-CM

## 2025-05-28 RX ORDER — MIRTAZAPINE 7.5 MG/1
TABLET, FILM COATED ORAL
Refills: 0 | OUTPATIENT
Start: 2025-05-28

## 2025-05-28 NOTE — TELEPHONE ENCOUNTER
No care due was identified.  Health [facility] Embedded Care Due Messages. Reference number: [id number].   5/28/2025 6:46:17 AM CDT

## 2025-05-28 NOTE — TELEPHONE ENCOUNTER
[facility] [place]. Inappropriate Request    Refill Authorization Note   Bhakti Phipps  is requesting a refill authorization.  Brief Assessment and Rationale for Refill:  Quick Discontinue  Medication Therapy Plan:    Pharmacy is requesting new scripts for the following medications without required information, (sig/ frequency/qty/etc)      Medication Reconciliation Completed:  No      Comments:   Pended Medication(s)       Requested Prescriptions     Pending Prescriptions Disp Refills    mirtazapine (REMERON) 7.5 MG Tab [Pharmacy Med Name: MIRTAZAPINE]  0        Duplicate Pended Encounter(s)/ Last Prescribed Details: (includes pharmacy & prescriber details)   Pharmacies have been requesting medications for patients without required information, (sig, frequency, qty, etc.). In addition, requests are sent for medication(s) pt. are currently not taking, and medications patients have never taken.    We have spoken to the pharmacies about these request types and advised their teams previously that we are unable to assess these New Script requests and require all details for these requests. This is a known issue and has been reported.            Note composed:6:59 AM 05/28/2025

## 2025-06-05 DIAGNOSIS — L21.9 SEBORRHEIC DERMATITIS: ICD-10-CM

## 2025-06-09 RX ORDER — KETOCONAZOLE 20 MG/ML
SHAMPOO, SUSPENSION TOPICAL
Qty: 120 ML | Refills: 0 | Status: SHIPPED | OUTPATIENT
Start: 2025-06-09 | End: 2025-06-09

## 2025-06-09 RX ORDER — KETOCONAZOLE 20 MG/ML
SHAMPOO, SUSPENSION TOPICAL
Qty: 120 ML | Refills: 5 | Status: SHIPPED | OUTPATIENT
Start: 2025-06-09

## 2025-06-12 DIAGNOSIS — N39.46 MIXED STRESS AND URGE URINARY INCONTINENCE: ICD-10-CM

## 2025-06-12 RX ORDER — VIBEGRON 75 MG/1
1 TABLET, FILM COATED ORAL
Qty: 90 TABLET | Refills: 2 | Status: SHIPPED | OUTPATIENT
Start: 2025-06-12

## 2025-06-12 NOTE — TELEPHONE ENCOUNTER
No care due was identified.  [facility] Embedded Care Due Messages. Reference number: [id number].   6/12/2025 11:56:45 AM CDT

## 2025-06-12 NOTE — TELEPHONE ENCOUNTER
Refill Decision Note   Bhakti Phipps  is requesting a refill authorization.  Brief Assessment and Rationale for Refill:  Approve     Medication Therapy Plan:        Comments:     Note composed:12:20 PM 06/12/2025

## 2025-06-23 ENCOUNTER — OFFICE VISIT (OUTPATIENT)
Dept: HOME HEALTH SERVICES | Facility: CLINIC | Age: OVER 89
End: 2025-06-23
Payer: MEDICARE

## 2025-06-23 VITALS
HEART RATE: 64 BPM | WEIGHT: 132 LBS | DIASTOLIC BLOOD PRESSURE: 60 MMHG | HEIGHT: 62 IN | BODY MASS INDEX: 24.29 KG/M2 | OXYGEN SATURATION: 98 % | SYSTOLIC BLOOD PRESSURE: 140 MMHG

## 2025-06-23 DIAGNOSIS — N39.3 STRESS INCONTINENCE: ICD-10-CM

## 2025-06-23 DIAGNOSIS — I10 ESSENTIAL HYPERTENSION: ICD-10-CM

## 2025-06-23 DIAGNOSIS — E78.2 MIXED HYPERLIPIDEMIA: ICD-10-CM

## 2025-06-23 DIAGNOSIS — Z00.00 ENCOUNTER FOR MEDICARE ANNUAL WELLNESS EXAM: Primary | ICD-10-CM

## 2025-06-23 DIAGNOSIS — E03.4 HYPOTHYROIDISM DUE TO ACQUIRED ATROPHY OF THYROID: ICD-10-CM

## 2025-06-23 PROCEDURE — 1101F PT FALLS ASSESS-DOCD LE1/YR: CPT | Mod: CPTII,S$GLB,,

## 2025-06-23 PROCEDURE — 3288F FALL RISK ASSESSMENT DOCD: CPT | Mod: CPTII,S$GLB,,

## 2025-06-23 PROCEDURE — G0439 PPPS, SUBSEQ VISIT: HCPCS | Mod: S$GLB,,,

## 2025-06-23 PROCEDURE — 1159F MED LIST DOCD IN RCRD: CPT | Mod: CPTII,S$GLB,,

## 2025-06-23 PROCEDURE — 1160F RVW MEDS BY RX/DR IN RCRD: CPT | Mod: CPTII,S$GLB,,

## 2025-06-23 PROCEDURE — G9919 SCRN ND POS ND PROV OF REC: HCPCS | Mod: CPTII,S$GLB,,

## 2025-06-23 NOTE — PATIENT INSTRUCTIONS
Counseling and Referral of Other Preventative  (Italic type indicates deductible and co-insurance are waived)    Patient Name: Bhakti Phipps  Today's Date: 6/23/2025    Health Maintenance       Date Due Completion Date    Lipid Panel 02/25/2026 2/25/2025    TETANUS VACCINE 01/20/2027 1/20/2017    DEXA Scan 10/05/2027 10/5/2022        No orders of the defined types were placed in this encounter.    The following information is provided to all patients.  This information is to help you find resources for any of the problems found today that may be affecting your health:                  Living healthy guide: www.[facility]      Understanding Diabetes: www.diabetes.org      Eating healthy: www.cdc.gov/healthyweight      CDC home safety checklist: www.cdc.gov/steadi/patient.html      Agency on Aging: www.goea.[place].[facility]      Alcoholics anonymous (AA): www.aa.org      Physical Activity: www.tiffany.nih.gov/iy1esni      Tobacco use: www.quitwithusla.org

## 2025-06-24 NOTE — ASSESSMENT & PLAN NOTE
Uncontrolled.  Was recently placed on Gemtesa.  Dry skin developed on bilateral arms and she thought this was a side effect of medication so she stopped taking it. Rash persisted x weeks and has just about resolved at this point.   She will restart Gemtesa and watch for any coinciding reaction.   Followed by urology and PCP.

## 2025-06-24 NOTE — ASSESSMENT & PLAN NOTE
TSH elevated but stable  Patient unable to tolerate synthroid at higher doses due to insomnia..   Continue levothyroxine 125mcg tablet.   F/u with PCP    Lab Results   Component Value Date    TSH 5.657 (H) 02/25/2025

## 2025-06-24 NOTE — PROGRESS NOTES
"  Bhakti Phipps presented for a follow-up Medicare AWV today. The following components were reviewed and updated:    Medical history  Family History  Social history  Allergies and Current Medications  Health Risk Assessment  Health Maintenance  Care Team    **See Completed Assessments for Annual Wellness visit with in the encounter summary    The following assessments were completed:  Depression Screening  Cognitive function Screening  Timed Get Up Test  Whisper Test      Opioid documentation:      Patient does not have a current opioid prescription.          Vitals:    06/23/25 1109   BP: (!) 140/60   Pulse: 64   SpO2: 98%   Weight: 59.9 kg (132 lb)   Height: 5' 2" (1.575 m)     Body mass index is 24.14 kg/m².       Physical Exam  Constitutional:       Appearance: Normal appearance.   HENT:      Head: Normocephalic.   Cardiovascular:      Rate and Rhythm: Normal rate and regular rhythm.      Pulses: Normal pulses.      Heart sounds: Normal heart sounds.   Pulmonary:      Effort: Pulmonary effort is normal. No respiratory distress.      Breath sounds: Normal breath sounds.   Abdominal:      General: Bowel sounds are normal.      Palpations: Abdomen is soft.   Musculoskeletal:         General: No swelling, tenderness or deformity.   Skin:     General: Skin is warm and dry.   Neurological:      Mental Status: She is alert and oriented to person, place, and time. Mental status is at baseline.   Psychiatric:         Mood and Affect: Mood normal.         Behavior: Behavior normal.       Diagnoses and health risks identified today and associated recommendations/orders:  1. Encounter for Medicare annual wellness exam  -     Referral to Enhanced Annual Wellness Visit (eAWV) W+1    2. Hypothyroidism due to acquired atrophy of thyroid  Assessment & Plan:  TSH elevated but stable  Patient unable to tolerate synthroid at higher doses due to insomnia..   Continue levothyroxine 125mcg tablet.   F/u with PCP    Lab Results "   Component Value Date    TSH 5.657 (H) 02/25/2025           3. Essential hypertension  Assessment & Plan:  Chronic and stable.  Continue current management; see med list below.   Recommend low sodium diet. Avoid frozen dinners, canned goods, if possible.  Follow up with PCP.      Hypertension Medications              NIFEdipine (PROCARDIA-XL) 60 MG (OSM) 24 hr tablet TAKE 1 TABLET EVERY DAY    olmesartan (BENICAR) 40 MG tablet TAKE 1 TABLET EVERY DAY    propranoloL (INDERAL) 20 MG tablet TAKE 1 TABLET TWICE DAILY                 4. Mixed hyperlipidemia  Assessment & Plan:  Chronic and stable.  Not on medication for control.  Heart healthy diet.   F/u with pcp.        Lab Results   Component Value Date    CHOL 177 02/25/2025    CHOL 187 01/17/2024    CHOL 206 (H) 07/19/2023     Lab Results   Component Value Date    HDL 43 02/25/2025    HDL 44 01/17/2024    HDL 42 07/19/2023     Lab Results   Component Value Date    LDLCALC 98.4 02/25/2025    LDLCALC 101.6 01/17/2024    LDLCALC 120.0 07/19/2023     Lab Results   Component Value Date    TRIG 178 (H) 02/25/2025    TRIG 207 (H) 01/17/2024    TRIG 220 (H) 07/19/2023       Lab Results   Component Value Date    CHOLHDL 24.3 02/25/2025    CHOLHDL 23.5 01/17/2024    CHOLHDL 20.4 07/19/2023              5. Stress incontinence  Assessment & Plan:  Uncontrolled.  Was recently placed on Gemtesa.  Dry skin developed on bilateral arms and she thought this was a side effect of medication so she stopped taking it. Rash persisted x weeks and has just about resolved at this point.   She will restart Gemtesa and watch for any coinciding reaction.   Followed by urology and PCP.              Provided Bhakti with a 5-10 year written screening schedule and personal prevention plan. Recommendations were developed using the USPSTF age appropriate recommendations. Education, counseling, and referrals were provided as needed.  After Visit Summary printed and given to patient which includes a  list of additional screenings\tests needed.    Follow up in about 1 year (around 6/23/2026) for Annual Wellness Visit.      ANGELA TIDWELL,, NP

## 2025-06-24 NOTE — ASSESSMENT & PLAN NOTE
Chronic and stable.  Not on medication for control.  Heart healthy diet.   F/u with pcp.        Lab Results   Component Value Date    CHOL 177 02/25/2025    CHOL 187 01/17/2024    CHOL 206 (H) 07/19/2023     Lab Results   Component Value Date    HDL 43 02/25/2025    HDL 44 01/17/2024    HDL 42 07/19/2023     Lab Results   Component Value Date    LDLCALC 98.4 02/25/2025    LDLCALC 101.6 01/17/2024    LDLCALC 120.0 07/19/2023     Lab Results   Component Value Date    TRIG 178 (H) 02/25/2025    TRIG 207 (H) 01/17/2024    TRIG 220 (H) 07/19/2023       Lab Results   Component Value Date    CHOLHDL 24.3 02/25/2025    CHOLHDL 23.5 01/17/2024    CHOLHDL 20.4 07/19/2023

## 2025-06-24 NOTE — ASSESSMENT & PLAN NOTE
Chronic and stable.  Continue current management; see med list below.   Recommend low sodium diet. Avoid frozen dinners, canned goods, if possible.  Follow up with PCP.      Hypertension Medications              NIFEdipine (PROCARDIA-XL) 60 MG (OSM) 24 hr tablet TAKE 1 TABLET EVERY DAY    olmesartan (BENICAR) 40 MG tablet TAKE 1 TABLET EVERY DAY    propranoloL (INDERAL) 20 MG tablet TAKE 1 TABLET TWICE DAILY

## 2025-07-10 ENCOUNTER — OFFICE VISIT (OUTPATIENT)
Facility: CLINIC | Age: OVER 89
End: 2025-07-10
Payer: MEDICARE

## 2025-07-10 DIAGNOSIS — L81.4 LENTIGINES: ICD-10-CM

## 2025-07-10 DIAGNOSIS — Z85.820 HISTORY OF MALIGNANT MELANOMA: ICD-10-CM

## 2025-07-10 DIAGNOSIS — Z12.83 SCREENING FOR SKIN CANCER: ICD-10-CM

## 2025-07-10 DIAGNOSIS — D22.9 MULTIPLE BENIGN NEVI: Primary | ICD-10-CM

## 2025-07-10 DIAGNOSIS — D18.01 CHERRY ANGIOMA: ICD-10-CM

## 2025-07-10 DIAGNOSIS — L57.0 AK (ACTINIC KERATOSIS): ICD-10-CM

## 2025-07-10 DIAGNOSIS — Z85.828 HISTORY OF NONMELANOMA SKIN CANCER: ICD-10-CM

## 2025-07-10 DIAGNOSIS — L82.1 SK (SEBORRHEIC KERATOSIS): ICD-10-CM

## 2025-07-10 PROCEDURE — 99999 PR PBB SHADOW E&M-EST. PATIENT-LVL II: CPT | Mod: PBBFAC,HCNC,, | Performed by: DERMATOLOGY

## 2025-07-10 NOTE — PROGRESS NOTES
Subjective:      Patient ID:  Bhakti Phipps is a 89 y.o. female who presents for   Chief Complaint   Patient presents with    Skin Check     TBSC     HPI    Established patient.  Here today for total body skin exam.      +MM  L neck with unknown characteristics, s/p WLE, bilateral lymph node dissection ~60 years ago    +NMSC  SCC at R cheek s/p Mohs 9/2023 (SK)  BCC at L preauricular s/p excision in 2012    Review of Systems    Objective:   Physical Exam   Constitutional: She appears well-developed and well-nourished. She is cooperative.   HENT:   Head: Normocephalic and atraumatic.   Eyes: Lids are normal. Lids are normal.  Right conjunctiva is not injected. Left conjunctiva is not injected. No conjunctival no injection.   Pulmonary/Chest: Effort normal. No respiratory distress.   Musculoskeletal: Lymphadenopathy:      Cervical: No cervical adenopathy.      Upper Body:   No axillary adenopathy present.No supraclavicular adenopathy is present.     Lymphadenopathy: No supraclavicular adenopathy is present.     She has no cervical adenopathy.     She has no axillary adenopathy.   Neurological: She is alert and oriented to person, place, and time.   Psychiatric: She has a normal mood and affect. Her speech is normal and behavior is normal. Mood, memory, affect and thought content normal.   Skin:   Areas Examined (abnormalities noted in diagram):   Scalp / Hair Palpated and Inspected  Head / Face Inspection Performed  Neck Inspection Performed  Chest / Axilla Inspection Performed  Abdomen Inspection Performed  Genitals / Buttocks / Groin Inspection Performed  Back Inspection Performed  RUE Inspected  LUE Inspection Performed  RLE Inspected  LLE Inspection Performed  Nails and Digits Inspection Performed  Gland Inspection Performed                     Diagram Legend     Erythematous scaling macule/papule c/w actinic keratosis       Vascular papule c/w angioma      Pigmented verrucoid papule/plaque c/w seborrheic  keratosis      Yellow umbilicated papule c/w sebaceous hyperplasia      Irregularly shaped tan macule c/w lentigo     1-2 mm smooth white papules consistent with Milia      Movable subcutaneous cyst with punctum c/w epidermal inclusion cyst      Subcutaneous movable cyst c/w pilar cyst      Firm pink to brown papule c/w dermatofibroma      Pedunculated fleshy papule(s) c/w skin tag(s)      Evenly pigmented macule c/w junctional nevus     Mildly variegated pigmented, slightly irregular-bordered macule c/w mildly atypical nevus      Flesh colored to evenly pigmented papule c/w intradermal nevus       Pink pearly papule/plaque c/w basal cell carcinoma      Erythematous hyperkeratotic cursted plaque c/w SCC      Surgical scar with no sign of skin cancer recurrence      Open and closed comedones      Inflammatory papules and pustules      Verrucoid papule consistent consistent with wart     Erythematous eczematous patches and plaques     Dystrophic onycholytic nail with subungual debris c/w onychomycosis     Umbilicated papule    Erythematous-base heme-crusted tan verrucoid plaque consistent with inflamed seborrheic keratosis     Erythematous Silvery Scaling Plaque c/w Psoriasis     See annotation      Assessment / Plan:       AK (actinic keratosis)  - Discussed diagnosis, etiology, and precancerous nature of condition.   - Cryosurgery Procedure Note: Discussed procedure with patient/patient's guardian including risks and benefits as well as treatment alternatives. Risks of procedure include pain, itching, swelling, redness, blistering, crusting, wound formation, post-inflammatory pigmentary alteration, scar, recurrence. Verbal consent obtained. LN2 cryosurgery performed to 7 lesion(s). Patient tolerated procedure well. After-visit wound care instructions reviewed and provided in writing.      Multiple benign nevi     Lentigines  - Benign; reassured treatment not necessary.   - Recommended daily sun protection, including  the use of OTC broad-spectrum sunscreen (SPF 30 or greater) and sun-protective clothing.       SK (seborrheic keratosis)  Cherry angioma  - Benign; reassured treatment not necessary.      History of malignant melanoma  History of nonmelanoma skin cancer  Screening for skin cancer  - Total body skin examination performed today.  - Findings listed above.   - Sites of prior malignancy and regional LN examined - no concern for recurrence today.    - Recommended routine self examination of skin.    - Recommended daily sun protection, including the use of OTC broad-spectrum sunscreen (SPF 30 or greater) and sun-protective clothing.             Follow up in about 6 months (around 1/10/2026) for ubse.       CORRECTIVE LENSES Corrective lenses

## 2025-07-22 DIAGNOSIS — G47.01 INSOMNIA DUE TO MEDICAL CONDITION: ICD-10-CM

## 2025-07-22 RX ORDER — MIRTAZAPINE 7.5 MG/1
TABLET, FILM COATED ORAL
Refills: 0 | OUTPATIENT
Start: 2025-07-22

## 2025-07-22 NOTE — TELEPHONE ENCOUNTER
No care due was identified.  [facility] Embedded Care Due Messages. Reference number: [id number].   7/22/2025 4:36:10 PM CDT

## 2025-07-28 DIAGNOSIS — L21.9 SEBORRHEIC DERMATITIS: ICD-10-CM

## 2025-07-28 NOTE — TELEPHONE ENCOUNTER
No care due was identified.  [facility] Embedded Care Due Messages. Reference number: [id number].   7/28/2025 10:18:32 AM CDT

## 2025-07-28 NOTE — TELEPHONE ENCOUNTER
Refill Routing Note   Medication(s) are not appropriate for processing by [facility] for the following reason(s):        Required vitals abnormal    [facility] action(s):  Defer             Appointments  past 12m or future 3m with PCP    Date Provider   Last Visit   2/25/2025 Zoë Garcia MD   Next Visit   8/26/2025 Zoë Garcia MD   ED visits in past 90 days: 0        Note composed:10:41 AM 07/28/2025

## 2025-07-29 RX ORDER — KETOCONAZOLE 20 MG/ML
SHAMPOO, SUSPENSION TOPICAL
Qty: 120 ML | Refills: 5 | Status: SHIPPED | OUTPATIENT
Start: 2025-07-29

## 2025-07-30 RX ORDER — PROPRANOLOL HYDROCHLORIDE 20 MG/1
20 TABLET ORAL 2 TIMES DAILY
Qty: 180 TABLET | Refills: 1 | Status: SHIPPED | OUTPATIENT
Start: 2025-07-30

## 2025-08-26 ENCOUNTER — OFFICE VISIT (OUTPATIENT)
Dept: FAMILY MEDICINE | Facility: CLINIC | Age: OVER 89
End: 2025-08-26
Payer: MEDICARE

## 2025-08-26 ENCOUNTER — LAB VISIT (OUTPATIENT)
Dept: LAB | Facility: HOSPITAL | Age: OVER 89
End: 2025-08-26
Attending: INTERNAL MEDICINE
Payer: MEDICARE

## 2025-08-26 VITALS
HEIGHT: 62 IN | TEMPERATURE: 98 F | OXYGEN SATURATION: 97 % | WEIGHT: 134.25 LBS | DIASTOLIC BLOOD PRESSURE: 78 MMHG | RESPIRATION RATE: 16 BRPM | SYSTOLIC BLOOD PRESSURE: 136 MMHG | HEART RATE: 65 BPM | BODY MASS INDEX: 24.7 KG/M2

## 2025-08-26 DIAGNOSIS — F41.9 ANXIETY: ICD-10-CM

## 2025-08-26 DIAGNOSIS — E03.4 HYPOTHYROIDISM DUE TO ACQUIRED ATROPHY OF THYROID: ICD-10-CM

## 2025-08-26 DIAGNOSIS — N39.46 MIXED STRESS AND URGE URINARY INCONTINENCE: ICD-10-CM

## 2025-08-26 DIAGNOSIS — G47.01 INSOMNIA DUE TO MEDICAL CONDITION: ICD-10-CM

## 2025-08-26 DIAGNOSIS — I10 ESSENTIAL HYPERTENSION: Primary | ICD-10-CM

## 2025-08-26 DIAGNOSIS — D64.9 ANEMIA, UNSPECIFIED TYPE: ICD-10-CM

## 2025-08-26 PROCEDURE — 1160F RVW MEDS BY RX/DR IN RCRD: CPT | Mod: CPTII,HCNC,S$GLB, | Performed by: INTERNAL MEDICINE

## 2025-08-26 PROCEDURE — 1101F PT FALLS ASSESS-DOCD LE1/YR: CPT | Mod: CPTII,HCNC,S$GLB, | Performed by: INTERNAL MEDICINE

## 2025-08-26 PROCEDURE — 1126F AMNT PAIN NOTED NONE PRSNT: CPT | Mod: CPTII,HCNC,S$GLB, | Performed by: INTERNAL MEDICINE

## 2025-08-26 PROCEDURE — G2211 COMPLEX E/M VISIT ADD ON: HCPCS | Mod: HCNC,S$GLB,, | Performed by: INTERNAL MEDICINE

## 2025-08-26 PROCEDURE — 99214 OFFICE O/P EST MOD 30 MIN: CPT | Mod: HCNC,S$GLB,, | Performed by: INTERNAL MEDICINE

## 2025-08-26 PROCEDURE — 3288F FALL RISK ASSESSMENT DOCD: CPT | Mod: CPTII,HCNC,S$GLB, | Performed by: INTERNAL MEDICINE

## 2025-08-26 PROCEDURE — 1159F MED LIST DOCD IN RCRD: CPT | Mod: CPTII,HCNC,S$GLB, | Performed by: INTERNAL MEDICINE

## 2025-08-26 PROCEDURE — 99999 PR PBB SHADOW E&M-EST. PATIENT-LVL III: CPT | Mod: PBBFAC,HCNC,, | Performed by: INTERNAL MEDICINE

## 2025-08-26 RX ORDER — VENLAFAXINE HYDROCHLORIDE 150 MG/1
150 CAPSULE, EXTENDED RELEASE ORAL DAILY
Qty: 90 CAPSULE | Refills: 3 | Status: SHIPPED | OUTPATIENT
Start: 2025-08-26

## 2025-08-26 RX ORDER — ESTRADIOL 0.1 MG/G
1 CREAM VAGINAL
Qty: 42.5 G | Refills: 3 | Status: SHIPPED | OUTPATIENT
Start: 2025-08-28

## 2025-08-26 RX ORDER — MIRTAZAPINE 7.5 MG/1
7.5 TABLET, FILM COATED ORAL NIGHTLY PRN
Qty: 30 TABLET | Refills: 1 | Status: SHIPPED | OUTPATIENT
Start: 2025-08-26 | End: 2026-08-26